# Patient Record
Sex: MALE | Race: OTHER | Employment: UNEMPLOYED | URBAN - METROPOLITAN AREA
[De-identification: names, ages, dates, MRNs, and addresses within clinical notes are randomized per-mention and may not be internally consistent; named-entity substitution may affect disease eponyms.]

---

## 2019-03-14 ENCOUNTER — HOSPITAL ENCOUNTER (INPATIENT)
Age: 24
LOS: 5 days | Discharge: HOME | DRG: 750 | End: 2019-03-19
Attending: EMERGENCY MEDICINE | Admitting: PSYCHIATRY & NEUROLOGY
Payer: MEDICAID

## 2019-03-14 DIAGNOSIS — Z00.00 GENERAL MEDICAL EXAM: ICD-10-CM

## 2019-03-14 DIAGNOSIS — R45.851 SUICIDAL IDEATION: ICD-10-CM

## 2019-03-14 DIAGNOSIS — F32.2 CURRENT SEVERE EPISODE OF MAJOR DEPRESSIVE DISORDER WITHOUT PSYCHOTIC FEATURES, UNSPECIFIED WHETHER RECURRENT (HCC): Primary | ICD-10-CM

## 2019-03-14 PROBLEM — F20.9 SCHIZOPHRENIA (HCC): Status: ACTIVE | Noted: 2019-03-14

## 2019-03-14 LAB
ALBUMIN SERPL-MCNC: 4.2 G/DL (ref 3.5–5)
ALBUMIN/GLOB SERPL: 1.2 {RATIO} (ref 1.1–2.2)
ALP SERPL-CCNC: 130 U/L (ref 45–117)
ALT SERPL-CCNC: 27 U/L (ref 12–78)
AMPHET UR QL SCN: NEGATIVE
ANION GAP SERPL CALC-SCNC: 3 MMOL/L (ref 5–15)
APAP SERPL-MCNC: <2 UG/ML (ref 10–30)
APPEARANCE UR: CLEAR
AST SERPL-CCNC: 15 U/L (ref 15–37)
BACTERIA URNS QL MICRO: NEGATIVE /HPF
BARBITURATES UR QL SCN: NEGATIVE
BASOPHILS # BLD: 0 K/UL (ref 0–0.1)
BASOPHILS NFR BLD: 0 % (ref 0–1)
BENZODIAZ UR QL: NEGATIVE
BILIRUB SERPL-MCNC: 0.5 MG/DL (ref 0.2–1)
BILIRUB UR QL: NEGATIVE
BUN SERPL-MCNC: 10 MG/DL (ref 6–20)
BUN/CREAT SERPL: 9 (ref 12–20)
CALCIUM SERPL-MCNC: 9.3 MG/DL (ref 8.5–10.1)
CANNABINOIDS UR QL SCN: NEGATIVE
CHLORIDE SERPL-SCNC: 105 MMOL/L (ref 97–108)
CO2 SERPL-SCNC: 31 MMOL/L (ref 21–32)
COCAINE UR QL SCN: NEGATIVE
COLOR UR: ABNORMAL
CREAT SERPL-MCNC: 1.07 MG/DL (ref 0.7–1.3)
DIFFERENTIAL METHOD BLD: ABNORMAL
DRUG SCRN COMMENT,DRGCM: NORMAL
EOSINOPHIL # BLD: 0 K/UL (ref 0–0.4)
EOSINOPHIL NFR BLD: 0 % (ref 0–7)
EPITH CASTS URNS QL MICRO: ABNORMAL /LPF
ERYTHROCYTE [DISTWIDTH] IN BLOOD BY AUTOMATED COUNT: 12.2 % (ref 11.5–14.5)
ETHANOL SERPL-MCNC: <10 MG/DL
GLOBULIN SER CALC-MCNC: 3.5 G/DL (ref 2–4)
GLUCOSE SERPL-MCNC: 88 MG/DL (ref 65–100)
GLUCOSE UR STRIP.AUTO-MCNC: NEGATIVE MG/DL
HCT VFR BLD AUTO: 44.7 % (ref 36.6–50.3)
HGB BLD-MCNC: 14.5 G/DL (ref 12.1–17)
HGB UR QL STRIP: ABNORMAL
IMM GRANULOCYTES # BLD AUTO: 0 K/UL (ref 0–0.04)
IMM GRANULOCYTES NFR BLD AUTO: 0 % (ref 0–0.5)
KETONES UR QL STRIP.AUTO: NEGATIVE MG/DL
LEUKOCYTE ESTERASE UR QL STRIP.AUTO: NEGATIVE
LYMPHOCYTES # BLD: 1.2 K/UL (ref 0.8–3.5)
LYMPHOCYTES NFR BLD: 17 % (ref 12–49)
MCH RBC QN AUTO: 29.6 PG (ref 26–34)
MCHC RBC AUTO-ENTMCNC: 32.4 G/DL (ref 30–36.5)
MCV RBC AUTO: 91.2 FL (ref 80–99)
METHADONE UR QL: NEGATIVE
MONOCYTES # BLD: 0.4 K/UL (ref 0–1)
MONOCYTES NFR BLD: 6 % (ref 5–13)
NEUTS SEG # BLD: 5.3 K/UL (ref 1.8–8)
NEUTS SEG NFR BLD: 77 % (ref 32–75)
NITRITE UR QL STRIP.AUTO: NEGATIVE
NRBC # BLD: 0 K/UL (ref 0–0.01)
NRBC BLD-RTO: 0 PER 100 WBC
OPIATES UR QL: NEGATIVE
PCP UR QL: NEGATIVE
PH UR STRIP: 7 [PH] (ref 5–8)
PLATELET # BLD AUTO: 221 K/UL (ref 150–400)
PMV BLD AUTO: 9.8 FL (ref 8.9–12.9)
POTASSIUM SERPL-SCNC: 3.9 MMOL/L (ref 3.5–5.1)
PROT SERPL-MCNC: 7.7 G/DL (ref 6.4–8.2)
PROT UR STRIP-MCNC: NEGATIVE MG/DL
RBC # BLD AUTO: 4.9 M/UL (ref 4.1–5.7)
RBC #/AREA URNS HPF: ABNORMAL /HPF (ref 0–5)
SALICYLATES SERPL-MCNC: <1.7 MG/DL (ref 2.8–20)
SODIUM SERPL-SCNC: 139 MMOL/L (ref 136–145)
SP GR UR REFRACTOMETRY: 1.01 (ref 1–1.03)
UR CULT HOLD, URHOLD: NORMAL
UROBILINOGEN UR QL STRIP.AUTO: 0.2 EU/DL (ref 0.2–1)
WBC # BLD AUTO: 7 K/UL (ref 4.1–11.1)
WBC URNS QL MICRO: ABNORMAL /HPF (ref 0–4)

## 2019-03-14 PROCEDURE — 80053 COMPREHEN METABOLIC PANEL: CPT

## 2019-03-14 PROCEDURE — 80307 DRUG TEST PRSMV CHEM ANLYZR: CPT

## 2019-03-14 PROCEDURE — 99285 EMERGENCY DEPT VISIT HI MDM: CPT

## 2019-03-14 PROCEDURE — 85025 COMPLETE CBC W/AUTO DIFF WBC: CPT

## 2019-03-14 PROCEDURE — 74011250637 HC RX REV CODE- 250/637: Performed by: PSYCHIATRY & NEUROLOGY

## 2019-03-14 PROCEDURE — 81001 URINALYSIS AUTO W/SCOPE: CPT

## 2019-03-14 PROCEDURE — 90791 PSYCH DIAGNOSTIC EVALUATION: CPT

## 2019-03-14 PROCEDURE — 36415 COLL VENOUS BLD VENIPUNCTURE: CPT

## 2019-03-14 PROCEDURE — 65220000003 HC RM SEMIPRIVATE PSYCH

## 2019-03-14 RX ORDER — ADHESIVE BANDAGE
30 BANDAGE TOPICAL DAILY PRN
Status: DISCONTINUED | OUTPATIENT
Start: 2019-03-14 | End: 2019-03-19 | Stop reason: HOSPADM

## 2019-03-14 RX ORDER — ACETAMINOPHEN 325 MG/1
650 TABLET ORAL
Status: DISCONTINUED | OUTPATIENT
Start: 2019-03-14 | End: 2019-03-19 | Stop reason: HOSPADM

## 2019-03-14 RX ORDER — BENZTROPINE MESYLATE 2 MG/1
2 TABLET ORAL
Status: DISCONTINUED | OUTPATIENT
Start: 2019-03-14 | End: 2019-03-19 | Stop reason: HOSPADM

## 2019-03-14 RX ORDER — LORAZEPAM 2 MG/ML
2 INJECTION INTRAMUSCULAR
Status: DISCONTINUED | OUTPATIENT
Start: 2019-03-14 | End: 2019-03-18

## 2019-03-14 RX ORDER — IBUPROFEN 200 MG
1 TABLET ORAL
Status: DISCONTINUED | OUTPATIENT
Start: 2019-03-14 | End: 2019-03-19 | Stop reason: HOSPADM

## 2019-03-14 RX ORDER — BENZTROPINE MESYLATE 1 MG/ML
2 INJECTION INTRAMUSCULAR; INTRAVENOUS
Status: DISCONTINUED | OUTPATIENT
Start: 2019-03-14 | End: 2019-03-19 | Stop reason: HOSPADM

## 2019-03-14 RX ORDER — TRAZODONE HYDROCHLORIDE 50 MG/1
50 TABLET ORAL
Status: DISCONTINUED | OUTPATIENT
Start: 2019-03-14 | End: 2019-03-15

## 2019-03-14 RX ORDER — OLANZAPINE 5 MG/1
5 TABLET ORAL
Status: DISCONTINUED | OUTPATIENT
Start: 2019-03-14 | End: 2019-03-19 | Stop reason: HOSPADM

## 2019-03-14 RX ORDER — LORAZEPAM 1 MG/1
1 TABLET ORAL
Status: DISCONTINUED | OUTPATIENT
Start: 2019-03-14 | End: 2019-03-18

## 2019-03-14 RX ORDER — ZOLPIDEM TARTRATE 10 MG/1
10 TABLET ORAL
Status: DISCONTINUED | OUTPATIENT
Start: 2019-03-14 | End: 2019-03-14

## 2019-03-14 RX ADMIN — ZOLPIDEM TARTRATE 10 MG: 10 TABLET ORAL at 21:36

## 2019-03-14 RX ADMIN — OLANZAPINE 5 MG: 5 TABLET, FILM COATED ORAL at 18:45

## 2019-03-14 NOTE — ED TRIAGE NOTES
Patient arrives for SI that has started a couple of months ago but yesterday became increasingly worse. No plan. Patient states that he has never carried through with an actual plan, just thoughts. Patient was on meds, but stopped them when they weren't approve by insurance. Mother arrives in triage with patient, tearmilvia eyed, states patient \"leaves home and he's disabled - autistic, psychosis. He's very combative these days. \"     + Auditory, visual hallucinations \"it feels like ten thousand people in the room, its loud. I cant turn it off. Denies HI    Contracts for safety. Patient also complains of right sided groin pain upon urination and bowel movement.

## 2019-03-14 NOTE — BH NOTES
PRN Medication Documentation    Specific patient behavior that led to need for PRN medication: pt c/o hearing voices Staff interventions attempted prior to PRN being given:redirection  PRN medication given: zyprexa Patient response/effectiveness of PRN medication: tl aware

## 2019-03-14 NOTE — BH NOTES
Primary Nurse Lu Whitlock RN and River Salmon RN performed a dual skin assessment on this patient. No impairment noted  Garry score is 23.

## 2019-03-14 NOTE — ROUTINE PROCESS
TRANSFER - OUT REPORT:    Verbal report given to Henry Jackson RN(name) on Vladimir Blood  being transferred to [facility](unit) for routine progression of care       Report consisted of patients Situation, Background, Assessment and   Recommendations(SBAR). Information from the following report(s) SBAR, Kardex, Intake/Output, MAR and Recent Results was reviewed with the receiving nurse. Lines:       Opportunity for questions and clarification was provided.

## 2019-03-14 NOTE — BSMART NOTE
Comprehensive Assessment Form Part 1      Section I - Disposition    Axis I - Schizophrenia  Axis II - Asperger Syndrome  Axis III -   Past Medical History:   Diagnosis Date    Asperger syndrome     Schizophrenia ([address]     Scoliosis      Axis IV - social isolation, absent father, recent relocation  Axis V - 30-35      The Medical Doctor to Psychiatrist conference was not completed. The Medical Doctor is in agreement with Psychiatrist disposition because of (reason) they agree with disposition to admit. The plan is admission to behavioral health unit. The on-call Psychiatrist consulted was NP Epstien. The admitting Psychiatrist will be to be determined. The admitting Diagnosis is Schizophrenia. The Payor source is [facility] [address] Medicaid. Section II - Integrated Summary  Summary: Writer met with this patient face to face at [facility] Emergency Department. He stated that he is here today due to thoughts of suicide for \"months\". He denied having a specific plan, however stated \"I will do anything at this point\". Willis [place] stated that he has been hearing voices that have gotten very loud. He says that they bully him and tell him that they are going to kill him. He says this has being causing \"episodes\" where he will fight the voices. He denied homicidal ideation and visual hallucinations. The patient's mother states that she does not believe that she can manage him at the moment and she is very scared for his safety. Writer contacted on call Nurse Practitioner Woodson Leyden who instructed writer to admit this patient to the behavioral health unit. Writer discussed this with the patient and his mother and he is voluntary for admission. The patient has demonstrated mental capacity to provide informed consent. The information is given by the patient and parent. The Chief Complaint is \"I'm having suicidal thoughts\". The Precipitant Factors are see axis IV.   Previous Hospitalizations: none  The patient has not previously been in restraints. Current Psychiatrist and/or  is none. Lethality Assessment:    The potential for suicide noted by the following: active psychosis, ideation and means . The potential for homicide is not noted. The patient has not been a perpetrator of sexual or physical abuse. There are not pending charges. The patient is felt to be at risk for self harm or harm to others. The attending nurse was advised the patient needs supervision. Section III - Psychosocial  The patient's overall mood and attitude is irritable. Feelings of helplessness and hopelessness are observed by self report of \"It's not going to get better. This is for life\". Generalized anxiety is not observed. Panic is not observed. Phobias are not observed. Obsessive compulsive tendencies are not observed. Section IV - Mental Status Exam  The patient's appearance shows no evidence of impairment. The patient's behavior shows poor eye contact. The patient is oriented to time, place, person and situation. The patient's speech is pressured. The patient's mood is irritable. The range of affect is labile. The patient's thought content demonstrates no evidence of impairment. The thought process is tangential.  The patient's perception demonstrated changes in the following:  auditory  visual hallucinations. The patient's memory shows no evidence of impairment. The patient's appetite shows no evidence of impairment. The patient's sleep has evidence of insomnia. The patient's insight is blaming. The patient's judgement is psychologically impaired. Section V - Substance Abuse  The patient is not using substances. Section VI - Living Arrangements  The patient is single. The patient lives with a parent. The patient has no children. The patient does plan to return home upon discharge. The patient does not have legal issues pending.  The patient's source of income comes from disability and family. Yazidi and cultural practices have been noted and include: a strong belief in Sabianism which he cites as a protective factor. The patient's greatest support comes from \"my mom\" and this person will be involved with the treatment. The patient has not been in an event described as horrible or outside the realm of ordinary life experience either currently or in the past.  The patient has not been a victim of sexual/physical abuse. Section VII - Other Areas of Clinical Concern  The highest grade achieved is 12th grade with the overall quality of school experience being described as poor. The patient is currently disabled and speaks [place] as a primary language. The patient has no communication impairments affecting communication. The patient's preference for learning can be described as: unknown given autism spectrum. The patient's hearing is normal.  The patient's vision is impaired and  wears glasses or contacts.       ANA Rodríguez, Supervisee in Social Work

## 2019-03-14 NOTE — ED PROVIDER NOTES
21 y.o. male with past medical history significant for Asperger syndrome, schizophrenia, and scoliosis who presents from home with chief complaint of SI. Pt has been experiencing worsening SI over the past couple of months with no plan. He also c/o auditory and visual hallucinations. Pt has a h/o autism but no other medical hx. Pt denies HI. There are no other acute medical concerns at this time. Social hx: no tobacco use; no EtOH use    Note written by Haritha Rosenberg, as dictated by Rhianna Tejada MD 3:51 PM      The history is provided by the patient. No  was used. Past Medical History:   Diagnosis Date    Asperger syndrome     [address] ([facility] [address]     Scoliosis        Past Surgical History:   Procedure Laterality Date    HX HERNIA REPAIR      as an infant          History reviewed. No pertinent family history. Social History     Socioeconomic History    Marital status: SINGLE     Spouse name: Not on file    Number of children: Not on file    Years of education: Not on file    Highest education level: Not on file   Social Needs    Financial resource strain: Not on file    Food insecurity - worry: Not on file    Food insecurity - inability: Not on file    Transportation needs - medical: Not on file   Genlot needs - non-medical: Not on file   Occupational History    Not on file   Tobacco Use    Smoking status: Never Smoker    Smokeless tobacco: Never Used   Substance and Sexual Activity    Alcohol use: No    Drug use: No    Sexual activity: Not on file   Other Topics Concern    Not on file   Social History Narrative    Not on file         ALLERGIES: Aspirin    Review of Systems   Constitutional: Negative for activity change, appetite change and fatigue. HENT: Negative for ear pain, facial swelling, sore throat and trouble swallowing. Eyes: Negative for pain, discharge and visual disturbance.    Respiratory: Negative for chest tightness, shortness of breath and wheezing. Cardiovascular: Negative for chest pain and palpitations. Gastrointestinal: Negative for abdominal pain, blood in stool, nausea and vomiting. Genitourinary: Negative for difficulty urinating, flank pain and hematuria. Musculoskeletal: Negative for arthralgias, joint swelling, myalgias and neck pain. Skin: Negative for color change and rash. Neurological: Negative for dizziness, weakness, numbness and headaches. Hematological: Negative for adenopathy. Does not bruise/bleed easily. Psychiatric/Behavioral: Positive for suicidal ideas. Negative for behavioral problems, confusion and sleep disturbance. All other systems reviewed and are negative. Vitals:    03/14/19 1323 03/14/19 1341   BP:  115/63   Pulse: 90 85   Resp:  15   Temp:  97.9 °F (36.6 °C)   SpO2: 100% 99%            Physical Exam   Constitutional: He is oriented to person, place, and time. He appears well-developed and well-nourished. No distress. HENT:   Head: Normocephalic and atraumatic. Nose: Nose normal.   Mouth/Throat: Oropharynx is clear and moist.   Eyes: Conjunctivae and EOM are normal. Pupils are equal, round, and reactive to light. No scleral icterus. Neck: Normal range of motion. Neck supple. No JVD present. No tracheal deviation present. No thyromegaly present. No carotid bruits noted. Cardiovascular: Normal rate, regular rhythm, normal heart sounds and intact distal pulses. Exam reveals no gallop and no friction rub. No murmur heard. Pulmonary/Chest: Effort normal and breath sounds normal. No respiratory distress. He has no wheezes. He has no rales. He exhibits no tenderness. Abdominal: Soft. Bowel sounds are normal. He exhibits no distension and no mass. There is no tenderness. There is no rebound and no guarding. Musculoskeletal: Normal range of motion. He exhibits no edema or tenderness. Lymphadenopathy:     He has no cervical adenopathy.    Neurological: He is alert and oriented to person, place, and time. He has normal reflexes. No cranial nerve deficit. Coordination normal.   Skin: Skin is warm and dry. No rash noted. No erythema. Psychiatric: He has a normal mood and affect. His behavior is normal. Judgment and thought content normal.   Nursing note and vitals reviewed. Note written by Haritha Zimmerman, as dictated by Shayan Irvin MD 3:53 PM     MDM  Number of Diagnoses or Management Options     Amount and/or Complexity of Data Reviewed  Clinical lab tests: ordered and reviewed  Decide to obtain previous medical records or to obtain history from someone other than the patient: yes  Review and summarize past medical records: yes  Discuss the patient with other providers: yes    Risk of Complications, Morbidity, and/or Mortality  Presenting problems: moderate  Diagnostic procedures: moderate  Management options: moderate    Patient Progress  Patient progress: stable         Procedures    CONSULT NOTE:  3:37 PM Shayan Irvin MD spoke with [facility]. Discussed available diagnostic tests and clinical findings. Mila Saleh has evaluated the pt and plans to admit to Psychiatry pending medical clearance. PROGRESS NOTE:  5:22 PM  Pt is medically cleared. Will admit to Psychiatry, MARISA Frank.    5:22 PM  Patient is being admitted to the hospital.  The results of their tests and reasons for their admission have been discussed with them and/or available family. They convey agreement and understanding for the need to be admitted and for their admission diagnosis.

## 2019-03-14 NOTE — ED NOTES
Patient wearing paper scrubs, clothing and belongings secured at nurses station.      Patient contracts for safety

## 2019-03-14 NOTE — BH NOTES
TRANSFER - IN REPORT:    Verbal report received from [facility] SHANITA (RN) on Rollo Severe  being received from ED for routine progression of care      Report consisted of patients Situation, Background, Assessment and   Recommendations(SBAR). Information from the following report(s) Kardex was reviewed with the receiving nurse. Opportunity for questions and clarification was provided. Assessment completed upon patients arrival to unit and care assumed.

## 2019-03-15 PROCEDURE — 74011250637 HC RX REV CODE- 250/637: Performed by: NURSE PRACTITIONER

## 2019-03-15 PROCEDURE — 65220000003 HC RM SEMIPRIVATE PSYCH

## 2019-03-15 PROCEDURE — 74011250637 HC RX REV CODE- 250/637: Performed by: PSYCHIATRY & NEUROLOGY

## 2019-03-15 RX ORDER — BUSPIRONE HYDROCHLORIDE 5 MG/1
5 TABLET ORAL 3 TIMES DAILY
Status: DISCONTINUED | OUTPATIENT
Start: 2019-03-15 | End: 2019-03-19 | Stop reason: HOSPADM

## 2019-03-15 RX ORDER — QUETIAPINE FUMARATE 50 MG/1
50 TABLET, FILM COATED ORAL
Status: ON HOLD | COMMUNITY
End: 2019-03-15

## 2019-03-15 RX ORDER — ARIPIPRAZOLE 5 MG/1
5 TABLET ORAL 2 TIMES DAILY
Status: DISCONTINUED | OUTPATIENT
Start: 2019-03-15 | End: 2019-03-18

## 2019-03-15 RX ORDER — SERTRALINE HYDROCHLORIDE 50 MG/1
50 TABLET, FILM COATED ORAL DAILY
Status: DISCONTINUED | OUTPATIENT
Start: 2019-03-15 | End: 2019-03-19 | Stop reason: HOSPADM

## 2019-03-15 RX ORDER — BENZTROPINE MESYLATE 1 MG/1
1 TABLET ORAL 2 TIMES DAILY
Status: DISCONTINUED | OUTPATIENT
Start: 2019-03-15 | End: 2019-03-19 | Stop reason: HOSPADM

## 2019-03-15 RX ORDER — QUETIAPINE FUMARATE 25 MG/1
50 TABLET, FILM COATED ORAL
Status: DISCONTINUED | OUTPATIENT
Start: 2019-03-15 | End: 2019-03-19 | Stop reason: HOSPADM

## 2019-03-15 RX ORDER — TRAZODONE HYDROCHLORIDE 50 MG/1
50 TABLET ORAL
Status: DISCONTINUED | OUTPATIENT
Start: 2019-03-15 | End: 2019-03-19 | Stop reason: HOSPADM

## 2019-03-15 RX ADMIN — SERTRALINE HYDROCHLORIDE 50 MG: 50 TABLET ORAL at 17:05

## 2019-03-15 RX ADMIN — BENZTROPINE MESYLATE 1 MG: 1 TABLET ORAL at 17:05

## 2019-03-15 RX ADMIN — ARIPIPRAZOLE 5 MG: 5 TABLET ORAL at 17:05

## 2019-03-15 RX ADMIN — OLANZAPINE 5 MG: 5 TABLET, FILM COATED ORAL at 12:21

## 2019-03-15 RX ADMIN — BUSPIRONE HYDROCHLORIDE 5 MG: 5 TABLET ORAL at 17:05

## 2019-03-15 RX ADMIN — BUSPIRONE HYDROCHLORIDE 5 MG: 5 TABLET ORAL at 20:46

## 2019-03-15 RX ADMIN — TRAZODONE HYDROCHLORIDE 50 MG: 50 TABLET ORAL at 20:46

## 2019-03-15 NOTE — PROGRESS NOTES
Problem: Discharge Planning  Goal: *Discharge to safe environment  Outcome: Progressing Towards Goal  He will return home when ready for discharge   Supportive mother     Goal: *Knowledge of medication management  Outcome: Progressing Towards Goal  He is willing to take his medications   Goal: *Knowledge of discharge instructions  Outcome: Progressing Towards Goal  He is able to verbalize discharge instructions and he will need an out patient provider     Problem: Patient Education: Go to Patient Education Activity  Goal: Patient/Family Education  Outcome: Progressing Towards Goal  SW will educate patient and family regarding discharge instructions

## 2019-03-15 NOTE — BH NOTES
PSYCHOSOCIAL ASSESSMENT  :Patient identifying info:  Maribel Corona is a 21 y.o., male admitted 3/14/2019  2:54 PM     Presenting problem and precipitating factors: he came to the ER with suicidal thoughts for \" months\" . He has been hearing voices telling him to harm self. He denied homicidal ideations and has never acted on the suicidal ideations . He was hospitalized once at age 16 at [facility] for voices . Mental status assessment:alert , oriented x's 3 , speech was somewhat pressured , thoughts were tangential , his judgement is impaired     Collateral information: Mother - Ms. Horton Bamberger- will to mom , she is supportive, stated he had been off medications since June 2018  He had been refusing to restart medications and had been actin out at home. She was glad he agreed to restart medications and we discussed his outpatient care to start at [facility] in the [address]. Current psychiatric /substance abuse providers and contact info: no current provider     Previous psychiatric/substance abuse providers and response to treatment: he was hospitalized once at [facility] age 16     Family history of mental illness or substance abuse: unknown     Substance abuse history:  No history  Social History     Tobacco Use    Smoking status: Never Smoker    Smokeless tobacco: Never Used   Substance Use Topics    Alcohol use: No       History of biomedical complications associated with substance abuse :n/a     Patient's current acceptance of treatment or motivation for change:no    Family constellation: single, no children    Is significant other involved?        Describe support system:mother      Describe living arrangements and home environment:lives with his mom     Health issues:   Hospital Problems  Never Reviewed          Codes Class Noted POA    Schizophrenia [facility]) ICD-10-CM: F20.9  ICD-9-CM: 295.90  3/14/2019 Unknown              Trauma history: none noted     Legal issues: no    History of  service: no    Financial status:     Quaker/cultural factors: María    Education/work history: high school education     Have you been licensed as a health care professional (current or ): no    Leisure and recreation preferences:     Describe coping skills:ineffectual     Isaac Middleton  3/15/2019

## 2019-03-15 NOTE — PROGRESS NOTES
Admission Medication Reconciliation:    Information obtained from:  Patient interview (pharmacist not present, per social work/provider)    Comments/Recommendations: Updated PTA meds/reviewed patient's allergies. 1)  Patient told RN, , and provider that he had previously been on:   - aripiprazole 10mg BID  - sertraline 100mg daily  - buspirone 15mg TID  - benztropine 1mg BID  - quetiapine 50mg QHS PRN sleep     Patient has not had medications in a few months as he recently moved from [facility] and has not yet set up care in [place].     2)  Medication changes (since last review): none     Allergies:  Aspirin    Significant PMH/Disease States:   Past Medical History:   Diagnosis Date    Asperger syndrome     [address]ia ([facility] [address]     Scoliosis      Chief Complaint for this Admission:    Chief Complaint   Patient presents with    Suicidal     Prior to Admission Medications:   None     Jason Pedersen, PharmD, BCPP, BCPS  Clinical Pharmacy Specialist, [facility]

## 2019-03-15 NOTE — PROGRESS NOTES
Problem: Altered Thought Process (Adult/Pediatric)  Goal: *STG: Complies with medication therapy  Outcome: Progressing Towards Goal  Patient is visible in the milieu. Alert, calm and cooperative. No distress noted.

## 2019-03-15 NOTE — PROGRESS NOTES
Problem: Falls - Risk of  Goal: *Absence of Falls  Document Stu Fall Risk and appropriate interventions in the flowsheet. Outcome: Progressing Towards Goal  Fall Risk Interventions:     Resting in bed with eyes closed, no complaints, no distress noted. Safety measures in place, will continue to monitor.                  History of Falls Interventions: (Patient verbalized hx of falling asleep when on the toilet  )

## 2019-03-15 NOTE — BH NOTES
Received pt on phone. NAD. Pt writing in note book. Calm. Problem: Altered Thought Process (Adult/Pediatric)    Meet by 3/27/19  Goal: *STG: Seeks staff when feelings of anxiety and fear arise  Outcome: Progressing Towards Goal  Pt verbalizes feelings, concerns, needs to staff. Goal: *STG: Complies with medication therapy  Outcome: Progressing Towards Goal  Medication and meal compliant. Education provided. Pt denies side effects to medications. Goal: *STG: Attends activities and groups  Outcome: Progressing Towards Goal  Pt encouraged to attend groups and activities. Goal: *STG: Decreased hallucinations  Outcome: Progressing Towards Goal  Pt reports AH. Increased. Impulsive. Anxious. Requires space to de escalate during increased anxiety and AH.      [address] [facility]  Master Treatment Plan for Nhung Armas    Date Treatment Plan Initiated: 3/15/19    Treatment Plan Modalities:  Type of Modality Amount  (x minutes) Frequency (x/week) Duration (x days) Name of Responsible Staff   [address] [facility] meetings to encourage peer interactions Jacob CHAUDHARI 16.   Group psychotherapy to assist in building coping skills and internal controls 60 7 1 Terrell Davenport   Therapeutic activity groups to build coping skills 60 7 1 Terrell Davenport   Psychoeducation in group setting to address:   Medication education   15 7 1 Brandon HAZEL   Coping skills         Relaxation techniques         Symptom management         Discharge planning   61 2 255 [facility]    60 2 1 150 [facility]   61 1 1 volunteer   Recovery/AA/NA      volunteer   Physician medication management   13 7 906 [facility]    Family meeting/discharge planning   15 2 1400 [facility] and Denise Nettle                                      Blocked Bed Documentation:    Room number: 726  Type: Behavior  Rationale: Impulsive  Anticipated duration: re evaluate in 24 hrs  Additional comments: ID

## 2019-03-15 NOTE — INTERDISCIPLINARY ROUNDS
Behavioral Health Interdisciplinary Rounds     Patient Name: Sulema Mercado  Age: 21 y.o. Room/Bed:  726/  Primary Diagnosis: <principal problem not specified>   Admission Status: Voluntary     Readmission within 30 days: no  Power of  in place: no  Patient requires a blocked bed: no          Reason for blocked bed:     VTE Prophylaxis: No    Mobility needs/Fall risk: no  Flu Vaccine : yes, PTA  Nutritional Plan: no  Consults:          Labs/Testing due today?: no    Sleep hours:  5      Participation in Care/Groups:  no  Medication Compliant? No scheduled meds  PRNS (last 24 hours): Sleep Aid    Restraints (last 24 hours):  no     CIWA (range last 24 hours):     COWS (range last 24 hours):      Alcohol screening (AUDIT) completed -   AUDIT Score: 0     If applicable, date SBIRT discussed in treatment team AND documented:   AUDIT Screen Score: AUDIT Score: 0    Tobacco - patient is a smoker: Have You Used Tobacco in the Past 30 Days: No  Illegal Drugs use: Have You Used Any Illegal Substances Over the Past 12 Months: No    24 hour chart check complete: yes     Patient goal(s) for today:   Treatment team focus/goals: Plan to assess for medications and discharge needs. Progress note : He was pleasant and complaint with his treatment. Off medications for several months     LOS:  1  Expected LOS: TBD    Financial concerns/prescription coverage:  Medicaid   Date of last family contact:  SW spoke to his mom. [phone]     Family requesting physician contact today:    Discharge plan: he will return home with his mom. Guns in the home:  no     Outpatient provider(s): TBD     Participating treatment team members: Sulema Rogelio,  Yaron Hudson, NP - [address] [facility] Greg[facility]armD.

## 2019-03-15 NOTE — BH NOTES
GROUP THERAPY PROGRESS NOTE    Indra Pascual participated in the Acute Unit's afternoon Process Group with a focus   on identifying feelings, planning for the rest of the day, and preparing for discharge. Group time: 45 minutes. Personal goal for participation: To increase the capacity to improve ones mood and structure. Goal orientation: The patient will be able to identify their feelings, develop a plan for structuring their day, and discharge planning. Group therapy participation: With prompting, this patient actively participated in the group. Therapeutic interventions reviewed and discussed: The group members were asked to introduce   themselves to each other and to see if they could identify an emotion they are having and/or let the group know what they want to focus on for the rest of the day as they continue to make discharge plans. Impression of participation: The patient said he was \"okay\" and that he was \"working on being positive. \" He was not very clear about what brought him to the hospital but he admitted to [facility] some issues. Annamary Ripa Annamary Ripa I think I needed a break. \" He was redirected to see if he could use more \"I\" statements, rather than \"you\" statements because some of his comments were very general and as if he wanted to share his knowledge about self-discovery with his peers by giving a mini-lecture. With \"I\" statements, it was suggested that he might be able to deepen the group's conversation and give others an opportunity to connect better with him. The patient was alert and generally oriented. He expressed no current SI/HI and displayed no overt psychotic symptoms in this group, although this latter concern was not fully explored in this group. His affect was mostly anxious and his mood was distancing due to his intellectualization and rationalization. He may need more education regarding the function of group therapy in future groups.  This was the patient's first process group with the undersigned.

## 2019-03-16 PROCEDURE — 74011250637 HC RX REV CODE- 250/637: Performed by: NURSE PRACTITIONER

## 2019-03-16 PROCEDURE — 65220000003 HC RM SEMIPRIVATE PSYCH

## 2019-03-16 RX ADMIN — BUSPIRONE HYDROCHLORIDE 5 MG: 5 TABLET ORAL at 22:03

## 2019-03-16 RX ADMIN — BUSPIRONE HYDROCHLORIDE 5 MG: 5 TABLET ORAL at 16:59

## 2019-03-16 RX ADMIN — ARIPIPRAZOLE 5 MG: 5 TABLET ORAL at 17:00

## 2019-03-16 RX ADMIN — BUSPIRONE HYDROCHLORIDE 5 MG: 5 TABLET ORAL at 09:33

## 2019-03-16 RX ADMIN — SERTRALINE HYDROCHLORIDE 50 MG: 50 TABLET ORAL at 09:33

## 2019-03-16 RX ADMIN — BENZTROPINE MESYLATE 1 MG: 1 TABLET ORAL at 09:33

## 2019-03-16 RX ADMIN — ARIPIPRAZOLE 5 MG: 5 TABLET ORAL at 09:33

## 2019-03-16 RX ADMIN — BENZTROPINE MESYLATE 1 MG: 1 TABLET ORAL at 17:00

## 2019-03-16 NOTE — BH NOTES
GROUP THERAPY PROGRESS NOTE    Gelnroy Calvert is participating in [place]. Group time: 15 minutes    Personal goal for participation: to focus on the present. Goal orientation: personal    Group therapy participation: active    Therapeutic interventions reviewed and discussed: Writer listened attentively and explained the unit routine. Impression of participation: Patient participated actively.

## 2019-03-16 NOTE — PROGRESS NOTES
Problem: Altered Thought Process (Adult/Pediatric)  Goal: *STG: Seeks staff when feelings of anxiety and fear arise  Outcome: Progressing Towards Goal  Patient is received up in his room pacing. Distracted. Preoccupied. Staff will continue q15 checks and encourage pt to share feelings.     Blocked Bed Documentation:     Room number: 726  Type: Behavior  Rationale: Poor Self-Control  Anticipated duration: to be assessed daily at treatment team

## 2019-03-16 NOTE — H&P
[address]  HISTORY AND PHYSICAL    Name:  Yesenia Syed  MR#:  [id number]  :  1995  ACCOUNT #:  [de-identified]  ADMIT DATE:  2019    CHIEF COMPLAINT:  \"I have thoughts of wanting to die. \"    HISTORY OF PRESENTING ILLNESS:  The patient is a 26-year-old male who is admitted to [address] [place] [address] on a voluntary basis. He states that he was diagnosed with Asperger's at age 8 and subsequently was diagnosed with schizophrenia. He states that he started battling depression when he was 6years old and it kept worsening. He had thoughts of suicidal ideation, but he never acted on it. He is endorsing auditory hallucinations, voices wanting to kill him; visual hallucinations, seeing regular people that are not in this world that  he can only see. He states that his mood can get unpredictable, can get acutely psychotic, can get angry beating and cursing. He states that he responds based on what the voices tell him and his life is in jeopardy because of these voices. According to the admission note, he has been having thoughts of suicidal ideation for \"months. \"  He denied any plan. He states that the voices have been increasingly worsening, and the voice bullies him. He has been having these episodes where he will try to fight the voices. He currently denies suicidal ideation or homicidal ideation, but is endorsing auditory and visual hallucinations. He has been off his medication for the past 2 months. PAST MEDICAL HISTORY:  See H and P. PAST PSYCHIATRIC HOSPITALIZATION:  He states that he was admitted at [facility] when he was 16years old. He states that he has been diagnosed with schizophrenia and Asperger's. He is currently not seeing a psychiatrist since he recently moved from [place] two months ago. PSYCHOSOCIAL HISTORY:  He is single. He has no children. He finished high school. He is currently unemployed.   He is currently receiving social security disability checks. MENTAL STATUS EXAMINATION:  He is alert and oriented in all spheres. He is dressed in street clothes. Mood is euthymic. Affect is full range. Speech is pressured. Thought process is somewhat disorganized. Memory seems intact. Intelligence is average. Insight is poor. Judgment is poor. DIAGNOSIS:  Schizoaffective disorder, depressive type. TREATMENT AND PLAN:  I will continue his inpatient stay. He will be provided with support and encourage to attend groups. His safety will be monitored. His medications will be modified and assessed. Case management will work on discharge planning. ASSETS AND STRENGTHS:  He is willing to take his medications. ESTIMATED LENGTH OF STAY:  Five to seven days.         JIM CAMPBELL NP      [id number]  D:  03/15/2019 22:04  T:  03/16/2019 6:52  JOB #:  [id number]

## 2019-03-16 NOTE — PROGRESS NOTES
Psychiatric Progress Note    Date: 3/16/2019  Account Number:  [de-identified]  Name: Kal Conrad    Cc: \"I feel better today. \"       Patient Active Problem List    Diagnosis Date Noted    Schizophrenia ([facility] [address] 03/14/2019     Past Surgical History:   Procedure Laterality Date    HX HERNIA REPAIR      as an infant       Allergies   Allergen Reactions    Aspirin Unknown (comments)      Social History     Tobacco Use    Smoking status: Never Smoker    Smokeless tobacco: Never Used   Substance Use Topics    Alcohol use: No      History reviewed. No pertinent family history. Review of Systems   Positive for hallucination; Appetite:good; Sleep good   Rest of psychiatric review of systems made and considered negative. Medical review of systems made and considered negative. Patient Vitals for the past 8 hrs:   BP Temp Pulse Resp SpO2   03/16/19 1538 131/73 97.6 °F (36.4 °C) 99 16    03/16/19 1200 129/81 97.6 °F (36.4 °C) 93 16 98 %       Mental Status exam: WNL except for    Sensorium  oriented to time, place and person   Relations cooperative   Appearance:  age appropriate   Speech:  normal pitch   Thought Process: within normal limits   Thought Content hallucinations   Suicidal ideations none   Homicidal ideations none   Mood:  euthymic   Affect:  full range   Memory recent  adequate   Memory remote:  adequate   Concentration:  adequate   Insight:  poor   Judgment:  poor       Assessment/Plan:     Diagnoses: Active Problems:    Schizophrenia [facility]) (3/14/2019)     3/1619:   Mr. Chatman states he is feeling better today. He slept over seven hours and has been active in the unit, social with his peers. He denies SI or HI. He had auditory hallucinations briefly but states it seems like it's decreasing. He denies VH. He refused labs this morning. Continue current meds as prescribed. We will closely monitor. Disposition planning. Psychotherapy provided in regards to pre-admission and current problems. Psychoeducation provided. Treatment plan reviewed with patient, including diagnoses and medications. Case discussed in full with treatment team.  Chart reviewed in full. Medications:  Current Facility-Administered Medications   Medication Dose Route Frequency    busPIRone (BUSPAR) tablet 5 mg  5 mg Oral TID    sertraline (ZOLOFT) tablet 50 mg  50 mg Oral DAILY    ARIPiprazole (ABILIFY) tablet 5 mg  5 mg Oral BID    benztropine (COGENTIN) tablet 1 mg  1 mg Oral BID    QUEtiapine (SEROquel) tablet 50 mg  50 mg Oral QHS PRN    traZODone (DESYREL) tablet 50 mg  50 mg Oral QHS PRN    ziprasidone (GEODON) 20 mg in sterile water (preservative free) 1 mL injection  20 mg IntraMUSCular BID PRN    OLANZapine (ZyPREXA) tablet 5 mg  5 mg Oral Q6H PRN    benztropine (COGENTIN) tablet 2 mg  2 mg Oral BID PRN    benztropine (COGENTIN) injection 2 mg  2 mg IntraMUSCular BID PRN    LORazepam (ATIVAN) injection 2 mg  2 mg IntraMUSCular Q4H PRN    LORazepam (ATIVAN) tablet 1 mg  1 mg Oral Q4H PRN    acetaminophen (TYLENOL) tablet 650 mg  650 mg Oral Q4H PRN    magnesium hydroxide (MILK OF MAGNESIA) 400 mg/5 mL oral suspension 30 mL  30 mL Oral DAILY PRN    nicotine (NICODERM CQ) 21 mg/24 hr patch 1 Patch  1 Patch TransDERmal DAILY PRN         [x]    Medication Changes---see above medication list for details  Will continue to titrate/adjust medications as tolerated and to response.       Side Effects:  none      Signed By: Trey Owen NP

## 2019-03-16 NOTE — PROGRESS NOTES
Problem: Falls - Risk of  Goal: *Absence of Falls  Document Stu Fall Risk and appropriate interventions in the flowsheet. Outcome: Progressing Towards Goal  Fall Risk Interventions:     Non slip socks  Bed in low position       Medication Interventions: Teach patient to arise slowly         History of Falls Interventions: Room close to nurse's station, Door open when patient unattended        Problem: Altered Thought Process (Adult/Pediatric)  Goal: *STG: Participates in treatment plan  Outcome: Progressing Towards Goal  Pt. Met in treatment team   Discussed his thoughts    Hearing voices   Cooperative on unit  Goal: *STG: Complies with medication therapy  Outcome: Progressing Towards Goal  Medications reviewed in treatment team    Goal: *STG: Decreased delusional thinking  Outcome: Progressing Towards Goal  Less delusional thinking  Goal: Interventions  Outcome: Progressing Towards Goal  Will continue to monitor  On 15 min. checks for safety   Assess thought process   Medication  Compliance  Effectiveness     Encourage groups    [facility]  Master Treatment Plan Soraida Peak        Date Treatment Plan Initiated: 3/16/2019      Treatment Plan Modalities:    Type of Modality Amount  (x minutes) Frequency (x/week) Duration (x days) Name of Responsible Staff   Community & wrap-up meetings to encourage peer interactions    15    7    1     DAPHNE Dinh   Group psychotherapy to assist in building coping skills and internal controls    60    7    1    Terrell Davenport LCSW   Therapeutic activity groups to build coping skills    60    7    1    Terrell Davenport LCSW   Psychoeducation in group setting to address:   Medication education    15    7    1    Angelica Sinha RN   Coping skills    20    7    1    Augusto Gonzalez RN   Relaxation techniques           Symptom management           Discharge planning    15    7    1    Bren Leyva    Spirituality     60    7    1    Chaplain Layne Sheehan PITA    60    7    1    Volunteer from [facility]/AA/NA    60    7    1    Volunteer from [address] [place] [address] medication management    15    7    1 Thuy Ireland meeting/discharge planning

## 2019-03-16 NOTE — BH NOTES
2046: PRN Medication Documentation    Specific patient behavior that led to need for PRN medication: Pt requested medication.   PRN medication given: trazodone

## 2019-03-16 NOTE — PROGRESS NOTES
Problem: Falls - Risk of  Goal: *Absence of Falls  Document Stu Fall Risk and appropriate interventions in the flowsheet. Outcome: Progressing Towards Goal  Fall Risk Interventions:            Medication Interventions: Teach patient to arise slowly         History of Falls Interventions: Room close to nurse's station, Door open when patient unattended    Resting in bed with eyes closed, no complaints, no distress noted. Safety measures in place, will continue to monitor.     Blocked Bed Documentation:     Room number: 726  Type: Behavior  Rationale: paraniod  Anticipated duration: 24 hours  Additional comments:paranoid    0603-refused am labs

## 2019-03-16 NOTE — INTERDISCIPLINARY ROUNDS
Behavioral Health Interdisciplinary Rounds     Patient Name: Antonette Avitia  Age: 21 y.o. Room/Bed:  726/  Primary Diagnosis: <principal problem not specified>   Admission Status: Voluntary     Readmission within 30 days: no  Power of  in place: no  Patient requires a blocked bed: Yes         Reason for blocked bed: behavior    VTE Prophylaxis: No    Mobility needs/Fall risk: no  Flu Vaccine : yes, PTA   Nutritional Plan: no  Consults:          Labs/Testing due today?: yes-refused    Sleep hours:  7.5      Participation in Care/Groups:  yes  Medication Compliant?: Yes  PRNS (last 24 hours):  Antipsychotic (PO)    Restraints (last 24 hours):  no     CIWA (range last 24 hours):     COWS (range last 24 hours):      Alcohol screening (AUDIT) completed -   AUDIT Score: 0     If applicable, date SBIRT discussed in treatment team AND documented:   AUDIT Screen Score: AUDIT Score: 0    Tobacco - patient is a smoker: Have You Used Tobacco in the Past 30 Days: No  Illegal Drugs use: Have You Used Any Illegal Substances Over the Past 12 Months: No    24 hour chart check complete: yes     Patient goal(s) for today: Engage in unit activities  Treatment team focus/goals: Continue medication regimen  Progress note: Pt denies SI/HI; endorses improved mood; states he experienced AH (voices bullying him) yesterday, but none today; endorses VH today    LOS:  2  Expected LOS: TBD    Financial concerns/prescription coverage: [employer]  Date of last family contact: 3/15 SW spoke to mom   Family requesting physician contact today: No  Discharge plan: Return to mother's home  Guns in the home: No      Outpatient provider(s): TBD    Participating treatment team members: Antonette Avitia, ANA Emerson; Brigid Watt NP; Deardara Puga RN

## 2019-03-16 NOTE — BH NOTES
GROUP THERAPY PROGRESS NOTE    Indra Pascual is participating in Reflections. Group time: 20 minutes    Personal goal for participation: unit orientation and daily progress    Goal orientation: personal    Group therapy participation: active    Therapeutic interventions reviewed and discussed: \"15 ways to be happy\"    Impression of participation: Seems in good spirits. No self-disclosures in group. Observed engaged in side conversation with peer and not totally attentive but did not require redirection.

## 2019-03-17 PROCEDURE — 74011250637 HC RX REV CODE- 250/637: Performed by: NURSE PRACTITIONER

## 2019-03-17 PROCEDURE — 65220000003 HC RM SEMIPRIVATE PSYCH

## 2019-03-17 RX ADMIN — BUSPIRONE HYDROCHLORIDE 5 MG: 5 TABLET ORAL at 09:23

## 2019-03-17 RX ADMIN — BENZTROPINE MESYLATE 1 MG: 1 TABLET ORAL at 17:28

## 2019-03-17 RX ADMIN — BUSPIRONE HYDROCHLORIDE 5 MG: 5 TABLET ORAL at 17:28

## 2019-03-17 RX ADMIN — ARIPIPRAZOLE 5 MG: 5 TABLET ORAL at 17:28

## 2019-03-17 RX ADMIN — BENZTROPINE MESYLATE 1 MG: 1 TABLET ORAL at 09:23

## 2019-03-17 RX ADMIN — SERTRALINE HYDROCHLORIDE 50 MG: 50 TABLET ORAL at 09:24

## 2019-03-17 RX ADMIN — ARIPIPRAZOLE 5 MG: 5 TABLET ORAL at 09:23

## 2019-03-17 RX ADMIN — BUSPIRONE HYDROCHLORIDE 5 MG: 5 TABLET ORAL at 21:30

## 2019-03-17 NOTE — PROGRESS NOTES
Problem: Falls - Risk of  Goal: *Absence of Falls  Document Stu Fall Risk and appropriate interventions in the flowsheet. Outcome: Progressing Towards Goal  Fall Risk Interventions:  Mobility Interventions: Assess mobility with egress test    Mentation Interventions: Adequate sleep, hydration, pain control    Medication Interventions: Teach patient to arise slowly    Elimination Interventions: Toilet paper/wipes in reach    History of Falls Interventions: Room close to nurse's station      Resting in bed with eyes closed, no complaints, no distress noted. Safety measures in place, will continue to monitor. Blocked Bed Documentation:     Room number: 726  Type: Behavior  Rationale: paraniod  Anticipated duration: 24 hours  Additional comments:paranoid    0500-Refused AM labs.

## 2019-03-17 NOTE — PROGRESS NOTES
Problem: Altered Thought Process (Adult/Pediatric)  Goal: *STG: Seeks staff when feelings of anxiety and fear arise  Outcome: Progressing Towards Goal  Pt verbalizes feelings, concerns, needs to staff. Goal: *STG: Complies with medication therapy  Outcome: Progressing Towards Goal  Medication and meal compliant. Education provided. Pt denies side effects to medications. Goal: *STG: Attends activities and groups  Outcome: Progressing Towards Goal  Pt encouraged to attend groups and activities. Goal: *STG: Decreased hallucinations  Outcome: Progressing Towards Goal  Pt  Denies current AH.      Blocked Bed Documentation:    Room number: 726  Type: Behavior  Rationale: Impulsive  Anticipated duration: re evaluate in 24 hrs  Additional comments:Autistism
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
## 2019-03-17 NOTE — BH NOTES
GROUP THERAPY PROGRESS NOTE    Lee Gary is participating in [place]. Group time: 15 minutes    Personal goal for participation: patient express he accomplished his goal from this morning and was feeling well.     Goal orientation: community    Group therapy participation: active    Therapeutic interventions reviewed and discussed: yes    Impression of participation: engaged

## 2019-03-17 NOTE — INTERDISCIPLINARY ROUNDS
Behavioral Health Interdisciplinary Rounds     Patient Name: Rollo Severe  Age: 21 y.o. Room/Bed:  726/  Primary Diagnosis: <principal problem not specified>   Admission Status: Voluntary     Readmission within 30 days: no  Power of  in place: no  Patient requires a blocked bed: yes          Reason for blocked bed: behavior        VTE Prophylaxis: No    Mobility needs/Fall risk: no  Flu Vaccine : yes, PTA  Nutritional Plan: no  Consults:          Labs/Testing due today?: yes    Sleep hours: 4       Participation in Care/Groups:  yes  Medication Compliant?: Yes  PRNS (last 24 hours): None    Restraints (last 24 hours):  no     CIWA (range last 24 hours):     COWS (range last 24 hours):      Alcohol screening (AUDIT) completed -   AUDIT Score: 0     If applicable, date SBIRT discussed in treatment team AND documented:   AUDIT Screen Score: AUDIT Score: 0      Document Brief Intervention (corresponds directly with the 5 A's, Ask, Advise, Assess, Assist, and Arrange): At- Risk Patients (Score 7-15 for women; 8-15 for men)  Discuss concern patient is drinking at unhealthy levels known to increase risk of alcohol-related health problems. Is Patient ready to commit to change? If No:   Encourage reflection   Discuss short term and long term health risks of consuming alcohol   Barriers to change   Reaffirm willingness to help / Educational materials provided  If Yes:   Set goal  Encarnate provided    Harmful use or Dependence (Score 16 or greater)   Discuss short term and long term health risks of consuming alcohol   Recommendations   Negotiate drinking goal   Recommend addiction specialist/center   Arrange follow-up appointments.     Tobacco - patient is a smoker: Have You Used Tobacco in the Past 30 Days: No  Illegal Drugs use: Have You Used Any Illegal Substances Over the Past 12 Months: No    24 hour chart check complete: yes     Patient goal(s) for today: Treatment team focus/goals:   Progress note     LOS:  3  Expected LOS:     Financial concerns/prescription coverage:    Date of last family contact:     Family requesting physician contact today:    Discharge plan:   Guns in the home:        Outpatient provider(s):     Participating treatment team members: Enedina Lopez, * (assigned SW),

## 2019-03-17 NOTE — PROGRESS NOTES
Problem: Altered Thought Process (Adult/Pediatric)  Goal: *STG: Seeks staff when feelings of anxiety and fear arise  Outcome: Progressing Towards Goal  Pt verbalized to staff about feeling upset that his grandmother is in rehab. Found out from his mother during visitation today  Goal: *STG: Complies with medication therapy  Outcome: Progressing Towards Goal  Med/meal compliant   Goal: *STG: Attends activities and groups  Outcome: Progressing Towards Goal  Attending groups on general unit  Goal: *STG: Decreased delusional thinking  Outcome: Progressing Towards Goal  Verbalizes no S/I or H/I at this time     NAD noted. Will continue to monitor.

## 2019-03-17 NOTE — BH NOTES
GROUP THERAPY PROGRESS NOTE    Antonette Avitia is participating in [place]. Group time: 15 minutes    Personal goal for participation: To be calm. Goal orientation: personal    Group therapy participation: active    Therapeutic interventions reviewed and discussed: Writer listened attentively and explained the unit routine. Impression of participation: Patient participated actively.

## 2019-03-17 NOTE — PROGRESS NOTES
Psychiatric Progress Note    Date: 3/17/2019  Account Number:  [de-identified]  Name: Dolly Han    Cc: \"I feel better today. \"       Patient Active Problem List    Diagnosis Date Noted    Schizophrenia ([address] 03/14/2019     Past Surgical History:   Procedure Laterality Date    HX HERNIA REPAIR      as an infant       Allergies   Allergen Reactions    Aspirin Unknown (comments)      Social History     Tobacco Use    Smoking status: Never Smoker    Smokeless tobacco: Never Used   Substance Use Topics    Alcohol use: No      History reviewed. No pertinent family history. Review of Systems   Positive for hallucination; Appetite:good; Sleep good   Rest of psychiatric review of systems made and considered negative. Medical review of systems made and considered negative. Patient Vitals for the past 8 hrs:   BP Temp Pulse Resp   03/17/19 1344 132/78 98.1 °F (36.7 °C) 82 16       Mental Status exam: WNL except for    Sensorium  oriented to time, place and person   Relations cooperative   Appearance:  age appropriate   Speech:  normal pitch   Thought Process: within normal limits   Thought Content hallucinations   Suicidal ideations none   Homicidal ideations none   Mood:  euthymic   Affect:  full range   Memory recent  adequate   Memory remote:  adequate   Concentration:  adequate   Insight:  poor   Judgment:  poor       Assessment/Plan:     Diagnoses: Active Problems:    Schizophrenia [facility]) (3/14/2019)     3/16/19:   Mr. Ivan Lucero states he is feeling better today. He slept over seven hours and has been active in the unit, social with his peers. He denies SI or HI. He had auditory hallucinations briefly but states it seems like it's decreasing. He denies VH. He refused labs this morning. Continue current meds as prescribed. We will closely monitor. Disposition planning. 3/17/19:   Mr. Ivna Lucero continues to feel better. He states that his anxiety and auditory hallucinations are improving.  He state he had a mild episode of \"anxiety attack\" earlier this morning because one of his peers was making me nervous. He is requesting to board at the general side. He refused labs this morning, he thought it was not necessary because labs were drawn two days ago. He slept for hours. He denies SI HI or VH. Tolerating his medications and denies side effects. Repeat labs in the morning, he is receptive to this. He can board on the general side. Continue current medications as prescribed. If stable in the next 24 hours, we will plan for discharge in a.m. Psychotherapy provided in regards to pre-admission and current problems. Psychoeducation provided. Treatment plan reviewed with patient, including diagnoses and medications. Case discussed in full with treatment team.  Chart reviewed in full.         Medications:  Current Facility-Administered Medications   Medication Dose Route Frequency    busPIRone (BUSPAR) tablet 5 mg  5 mg Oral TID    sertraline (ZOLOFT) tablet 50 mg  50 mg Oral DAILY    ARIPiprazole (ABILIFY) tablet 5 mg  5 mg Oral BID    benztropine (COGENTIN) tablet 1 mg  1 mg Oral BID    QUEtiapine (SEROquel) tablet 50 mg  50 mg Oral QHS PRN    traZODone (DESYREL) tablet 50 mg  50 mg Oral QHS PRN    ziprasidone (GEODON) 20 mg in sterile water (preservative free) 1 mL injection  20 mg IntraMUSCular BID PRN    OLANZapine (ZyPREXA) tablet 5 mg  5 mg Oral Q6H PRN    benztropine (COGENTIN) tablet 2 mg  2 mg Oral BID PRN    benztropine (COGENTIN) injection 2 mg  2 mg IntraMUSCular BID PRN    LORazepam (ATIVAN) injection 2 mg  2 mg IntraMUSCular Q4H PRN    LORazepam (ATIVAN) tablet 1 mg  1 mg Oral Q4H PRN    acetaminophen (TYLENOL) tablet 650 mg  650 mg Oral Q4H PRN    magnesium hydroxide (MILK OF MAGNESIA) 400 mg/5 mL oral suspension 30 mL  30 mL Oral DAILY PRN    nicotine (NICODERM CQ) 21 mg/24 hr patch 1 Patch  1 Patch TransDERmal DAILY PRN         [x]    Medication Changes---see above medication list for details  Will continue to titrate/adjust medications as tolerated and to response.       Side Effects:  none      Signed By: Dharmesh Bartlett NP

## 2019-03-17 NOTE — BH NOTES
Blocked Bed Documentation:    Room number: 726  Type: Behavior  Rationale: Poor Self-Control  Anticipated duration: to be assessed daily at treatment team

## 2019-03-18 PROCEDURE — 74011250637 HC RX REV CODE- 250/637: Performed by: NURSE PRACTITIONER

## 2019-03-18 PROCEDURE — 74011250637 HC RX REV CODE- 250/637: Performed by: PSYCHIATRY & NEUROLOGY

## 2019-03-18 PROCEDURE — 65220000003 HC RM SEMIPRIVATE PSYCH

## 2019-03-18 RX ORDER — HYDROXYZINE 50 MG/1
50 TABLET, FILM COATED ORAL
Status: DISCONTINUED | OUTPATIENT
Start: 2019-03-18 | End: 2019-03-19 | Stop reason: HOSPADM

## 2019-03-18 RX ORDER — ARIPIPRAZOLE 5 MG/1
15 TABLET ORAL DAILY
Status: DISCONTINUED | OUTPATIENT
Start: 2019-03-19 | End: 2019-03-19 | Stop reason: HOSPADM

## 2019-03-18 RX ORDER — ARIPIPRAZOLE 5 MG/1
10 TABLET ORAL
Status: COMPLETED | OUTPATIENT
Start: 2019-03-18 | End: 2019-03-18

## 2019-03-18 RX ADMIN — BENZTROPINE MESYLATE 1 MG: 1 TABLET ORAL at 08:52

## 2019-03-18 RX ADMIN — ARIPIPRAZOLE 10 MG: 5 TABLET ORAL at 11:09

## 2019-03-18 RX ADMIN — TRAZODONE HYDROCHLORIDE 50 MG: 50 TABLET ORAL at 21:00

## 2019-03-18 RX ADMIN — BUSPIRONE HYDROCHLORIDE 5 MG: 5 TABLET ORAL at 21:00

## 2019-03-18 RX ADMIN — SERTRALINE HYDROCHLORIDE 50 MG: 50 TABLET ORAL at 08:52

## 2019-03-18 RX ADMIN — ARIPIPRAZOLE 5 MG: 5 TABLET ORAL at 08:51

## 2019-03-18 RX ADMIN — BUSPIRONE HYDROCHLORIDE 5 MG: 5 TABLET ORAL at 08:51

## 2019-03-18 RX ADMIN — BENZTROPINE MESYLATE 1 MG: 1 TABLET ORAL at 17:04

## 2019-03-18 RX ADMIN — HYDROXYZINE HYDROCHLORIDE 50 MG: 50 TABLET, FILM COATED ORAL at 17:04

## 2019-03-18 RX ADMIN — BUSPIRONE HYDROCHLORIDE 5 MG: 5 TABLET ORAL at 17:04

## 2019-03-18 RX ADMIN — LORAZEPAM 1 MG: 1 TABLET ORAL at 02:37

## 2019-03-18 NOTE — BH NOTES
Patient was boarding on [address] [facility] when general nurse called with concerns about pt behavior. He was moving about quickly, running and spinning on unit. He returned to the acute unit. Calm/cooperative and started participating in acute unit evening group.

## 2019-03-18 NOTE — PROGRESS NOTES
GROUP THERAPY PROGRESS NOTE    Jovany Salmon is participating in [place] [employer]. Group time: 15 minutes    Personal goal for participation: Patient stated his goal was\" to get through the day without incident. \" Patient stated he achieved it by \"reading the bible. \"    Goal orientation: community    Group therapy participation: active    Therapeutic interventions reviewed and discussed: Reviewed and reflected on goals set this morning and participated in an activity. Impression of participation: Active in discussion and provided positive feedback.

## 2019-03-18 NOTE — DISCHARGE INSTRUCTIONS
DISCHARGE SUMMARY    Manolo Hamm  : 1995  MRN: [id number]    The patient Glenroy Calvert exhibits the ability to control behavior in a less restrictive environment. Patient's level of functioning is improving. No assaultive/destructive behavior has been observed for the past 24 hours. No suicidal/homicidal threat or behavior has been observed for the past 24 hours. There is no evidence of serious medication side effects. Patient has not been in physical or protective restraints for at least the past 24 hours. If weapons involved, how are they secured? No weapons involved     Is patient aware of and in agreement with discharge plan? Patient is aware of discharge and is in agreement     Arrangements for medication:  Prescriptions given to patient. Copy of discharge instructions to  provider?:  Yes, fax to [phone]     Arrangements for transportation home:  Mother to      Keep all follow up appointments as scheduled, continue to take prescribed medications per physician instructions. Mental health crisis number:  [phone] or your local mental health crisis line number at [address] from Nurse    PATIENT INSTRUCTIONS: prescriptions given to patient      What to do at Home:  Recommended activity: Activity as tolerated,         *  Please give a list of your current medications to your Primary Care Provider. *  Please update this list whenever your medications are discontinued, doses are      changed, or new medications (including over-the-counter products) are added. *  Please carry medication information at all times in case of emergency situations. These are general instructions for a healthy lifestyle:    No smoking/ No tobacco products/ Avoid exposure to second hand smoke  Surgeon General's Warning:  Quitting smoking now greatly reduces serious risk to your health.     Obesity, smoking, and sedentary lifestyle greatly increases your risk for illness    A healthy diet, regular physical exercise & weight monitoring are important for maintaining a healthy lifestyle    You may be retaining fluid if you have a history of heart failure or if you experience any of the following symptoms:  Weight gain of 3 pounds or more overnight or 5 pounds in a week, increased swelling in our hands or feet or shortness of breath while lying flat in bed. Please call your doctor as soon as you notice any of these symptoms; do not wait until your next office visit. Recognize signs and symptoms of STROKE:    F-face looks uneven    A-arms unable to move or move unevenly    S-speech slurred or non-existent    T-time-call 911 as soon as signs and symptoms begin-DO NOT go       Back to bed or wait to see if you get better-TIME IS BRAIN. Warning Signs of HEART ATTACK     Call 911 if you have these symptoms:   Chest discomfort. Most heart attacks involve discomfort in the center of the chest that lasts more than a few minutes, or that goes away and comes back. It can feel like uncomfortable pressure, squeezing, fullness, or pain.  Discomfort in other areas of the upper body. Symptoms can include pain or discomfort in one or both arms, the back, neck, jaw, or stomach.  Shortness of breath with or without chest discomfort.  Other signs may include breaking out in a cold sweat, nausea, or lightheadedness. Don't wait more than five minutes to call 911 - MINUTES MATTER! Fast action can save your life. Calling 911 is almost always the fastest way to get lifesaving treatment. Emergency Medical Services staff can begin treatment when they arrive -- up to an hour sooner than if someone gets to the hospital by car. The discharge information has been reviewed with the patient. The patient verbalized understanding. Discharge medications reviewed with the patient and appropriate educational materials and side effects teaching were provided. [employer] Activation    Thank you for requesting access to [employer]. Please follow the instructions below to securely access and download your online medical record. [employer] allows you to send messages to your doctor, view your test results, renew your prescriptions, schedule appointments, and more. How Do I Sign Up? 1. In your internet browser, go to [email or web address]  2. Click on the First Time User? Click Here link in the Sign In box. You will be redirect to the New Member Sign Up page. 3. Enter your [employer] Access Code exactly as it appears below. You will not need to use this code after youve completed the sign-up process. If you do not sign up before the expiration date, you must request a new code. [employer] Access Code: [email or web address]  Expires: 2019  1:23 PM (This is the date your [employer] access code will )    4. Enter the last four digits of your Social Security Number (xxxx) and Date of Birth (mm/dd/yyyy) as indicated and click Submit. You will be taken to the next sign-up page. 5. Create a [employer] ID. This will be your [employer] login ID and cannot be changed, so think of one that is secure and easy to remember. 6. Create a [employer] password. You can change your password at any time. 7. Enter your Password Reset Question and Answer. This can be used at a later time if you forget your password. 8. Enter your e-mail address. You will receive e-mail notification when new information is available in [address] 9. Click Sign Up. You can now view and download portions of your medical record. 10. Click the Download Summary menu link to download a portable copy of your medical information. Additional Information    If you have questions, please visit the Frequently Asked Questions section of the [employer] website at [email or web address] Remember, [employer] is NOT to be used for urgent needs.  For medical emergencies, dial 911.        ___________________________________________________________________________________________________________________________________

## 2019-03-18 NOTE — BH NOTES
Blocked Bed Documentation:    Room number: 726  Type: Behavior  Rationale: Poor Self-Control  Anticipated duration: TBD  Additional comments:N/a

## 2019-03-18 NOTE — PROGRESS NOTES
Problem: Altered Thought Process (Adult/Pediatric)  Goal: *STG: Decreased delusional thinking  Outcome: Progressing Towards Goal  Received pt awake in bed reading a book. Pt had no complaints or concerns. Pt appears calm and he has no signs of distress at this time. Continuing to monitor pt with q15 min safety rounds.

## 2019-03-18 NOTE — PROGRESS NOTES
Problem: Falls - Risk of  Goal: *Absence of Falls  Document Stu Fall Risk and appropriate interventions in the flowsheet. Outcome: Progressing Towards Goal  Fall Risk Interventions: non slip  Socks  Bed in low position  Mobility Interventions: Assess mobility with egress test    Mentation Interventions: Adequate sleep, hydration, pain control    Medication Interventions: Teach patient to arise slowly    Elimination Interventions: Toilet paper/wipes in reach    History of Falls Interventions: Room close to nurse's station        Problem: Altered Thought Process (Adult/Pediatric)  Goal: *STG: Participates in treatment plan  Outcome: Progressing Towards Goal  Pt. Met in treatment team      Anxious  Loose    Some impulsivity  Intrusive with select peers  Goal: *STG: Complies with medication therapy  Outcome: Progressing Towards Goal  Medication compliant   Reviewed in treatment team    Goal: *STG: Attends activities and groups  Outcome: Progressing Towards Goal  Attending groups   Boarded in general unit  Sent back to acute unit because of loose intrusive behavior  Goal: *STG: Decreased delusional thinking  Outcome: Progressing Towards Goal  Some paranoia  Anxiety  Needing verbal re-direction   Goal: Interventions  Outcome: Not Progressing Towards Goal  Will continue to monitor  On 15 min.  Checks for safety  Assess thought process    Lability    Medication compliance  Effectiveness     Encourage groups

## 2019-03-18 NOTE — PROGRESS NOTES
Psychiatric Progress Note    Date: 3/18/2019  Account Number:  [de-identified]  Name: Kenia Brenner    Cc: \"I have issues at home. \"       Patient Active Problem List    Diagnosis Date Noted    Schizophrenia ([address] 03/14/2019     Past Surgical History:   Procedure Laterality Date    HX HERNIA REPAIR      as an infant       Allergies   Allergen Reactions    Aspirin Unknown (comments)      Social History     Tobacco Use    Smoking status: Never Smoker    Smokeless tobacco: Never Used   Substance Use Topics    Alcohol use: No      History reviewed. No pertinent family history. Review of Systems   Positive for hallucination; Appetite:good; Sleep good   Rest of psychiatric review of systems made and considered negative. Medical review of systems made and considered negative. Patient Vitals for the past 8 hrs:   BP Temp Pulse Resp SpO2   03/18/19 0735 113/63 98 °F (36.7 °C) 80 16 100 %       Mental Status exam: WNL except for    Sensorium  oriented to time, place and person   Relations cooperative   Appearance:  age appropriate   Speech:  normal pitch   Thought Process: within normal limits   Thought Content hallucinations   Suicidal ideations none   Homicidal ideations none   Mood:  euthymic   Affect:  full range   Memory recent  adequate   Memory remote:  adequate   Concentration:  adequate   Insight:  poor   Judgment:  poor       Assessment/Plan:     Diagnoses: Active Problems:    Schizophrenia [facility]) (3/14/2019)     3/16/19:   Mr. Tutu Lau states he is feeling better today. He slept over seven hours and has been active in the unit, social with his peers. He denies SI or HI. He had auditory hallucinations briefly but states it seems like it's decreasing. He denies VH. He refused labs this morning. Continue current meds as prescribed. We will closely monitor. Disposition planning. 3/17/19:   Mr. Tutu Lau continues to feel better. He states that his anxiety and auditory hallucinations are improving.  He state he had a mild episode of \"anxiety attack\" earlier this morning because one of his peers was making me nervous. He is requesting to board at the general side. He refused labs this morning, he thought it was not necessary because labs were drawn two days ago. He slept for hours. He denies SI HI or VH. Tolerating his medications and denies side effects. Repeat labs in the morning, he is receptive to this. He can board on the general side. Continue current medications as prescribed. If stable in the next 24 hours, we will plan for discharge in a.m.    3/18/19:  Mr Chatman states he is having a lot of issues at home. He boarded on the general side yesterday but had to be moved back to acute unit due to being inappropriate. He also ref labs again this am. Off meds since June last year. Will dc ativan, start vistaril prn. Didn't sleep much last night which abilify could be contributing. Changed abilify to daytime and increase to 15mg with a now dose of 10mg. Required prn last night. Denies si/hi. Ah continues to get better. We will closely monitor. Cancel discharge this am, and will reevaluate in am.     Psychotherapy provided in regards to pre-admission and current problems. Psychoeducation provided. Treatment plan reviewed with patient, including diagnoses and medications. Case discussed in full with treatment team.  Chart reviewed in full.         Medications:  Current Facility-Administered Medications   Medication Dose Route Frequency    hydrOXYzine HCl (ATARAX) tablet 50 mg  50 mg Oral Q6H PRN    [START ON 3/19/2019] ARIPiprazole (ABILIFY) tablet 15 mg  15 mg Oral DAILY    ARIPiprazole (ABILIFY) tablet 10 mg  10 mg Oral NOW    busPIRone (BUSPAR) tablet 5 mg  5 mg Oral TID    sertraline (ZOLOFT) tablet 50 mg  50 mg Oral DAILY    benztropine (COGENTIN) tablet 1 mg  1 mg Oral BID    QUEtiapine (SEROquel) tablet 50 mg  50 mg Oral QHS PRN    traZODone (DESYREL) tablet 50 mg  50 mg Oral QHS PRN    ziprasidone (GEODON) 20 mg in sterile water (preservative free) 1 mL injection  20 mg IntraMUSCular BID PRN    OLANZapine (ZyPREXA) tablet 5 mg  5 mg Oral Q6H PRN    benztropine (COGENTIN) tablet 2 mg  2 mg Oral BID PRN    benztropine (COGENTIN) injection 2 mg  2 mg IntraMUSCular BID PRN    acetaminophen (TYLENOL) tablet 650 mg  650 mg Oral Q4H PRN    magnesium hydroxide (MILK OF MAGNESIA) 400 mg/5 mL oral suspension 30 mL  30 mL Oral DAILY PRN    nicotine (NICODERM CQ) 21 mg/24 hr patch 1 Patch  1 Patch TransDERmal DAILY PRN         [x]    Medication Changes---see above medication list for details  Will continue to titrate/adjust medications as tolerated and to response. Side Effects:  none      \pardI certify that this patient's inpatient psychiatric hospital services furnished since the previous certification were, and continue to be, required for treatment that could reasonably be expected to improve the patient's condition, or for diagnostic study, and that the patient continues to need, on a daily basis, active treatment furnished directly by or requiring the supervision of inpatient psychiatric facility personnel. In addition, the hospital records show that services furnished were intensive treatment services, admission or related services, or equivalent services.         Signed By: Twin Estrada NP
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
## 2019-03-18 NOTE — PROGRESS NOTES
Problem: Altered Thought Process (Adult/Pediatric)  Goal: *STG: Attends activities and groups  Outcome: Progressing Towards Goal  Will contine encouragement of attending group.   Will continue pt education regarding

## 2019-03-18 NOTE — BH NOTES
PRN Medication Documentation    Specific patient behavior that led to need for PRN medication: c/o restlessness, increased anxiety  Staff interventions attempted prior to PRN being given: therapeutic communication, relaxation techniques  PRN medication given: PO Ativan  Patient response/effectiveness of PRN medication: pt quietly lying in bed.    0400 Patient quietly resting in bed.

## 2019-03-18 NOTE — BH NOTES
GROUP THERAPY PROGRESS NOTE    The patient Haja Deshpande is participating in [employer]. Group time: 30 minutes    Personal goal for participation: to orient the patient to the unit.     Goal orientation: successful adoption of unit rules    Group therapy participation: active    Therapeutic interventions reviewed and discussed: Yes    Impression of participation:     Dea Salvador  3/18/2019 10:42 AM

## 2019-03-18 NOTE — BH NOTES
Pt pacing in back day room as if unable to sit still. Patient has been constantly moving throughout shift. When asked about his hyperactivity, he smiles, is calm/cooperative and states \"There just anywhere to go to walk.  I like to walk\"

## 2019-03-18 NOTE — BH NOTES
GROUP THERAPY PROGRESS NOTE    Meño Iniguez participated in the Acute Unit's afternoon Process Group with a focus   on identifying feelings, planning for the rest of the day, and preparing for discharge. Group time: 35 minutes. Personal goal for participation: To increase the capacity to improve ones mood and structure. Goal orientation: The patient will be able to identify their feelings, develop a plan for structuring their day, and discharge planning. Group therapy participation: With prompting, this patient partially participated in the group. Therapeutic interventions reviewed and discussed: The group members were asked to introduce   themselves to each other and to see if they could identify an emotion they are having and/or let the group know what they want to focus on for the rest of the day as they continue to make discharge plans. Impression of participation: The patient said he \"just wanted to get through the day. ..okay. ..and disappointed,\" because he could not leave the hospital today. He was alert, generally oriented, and left the group to go towards his room soon after his brief sharing. The patient expressed no current SI/HI and it was not clear if he was harboring any delusional thought about his treatment. His affect was anxious and suspicious of his treatment team. His mood matched his affect. His inability to stay focused and his moodiness made if difficult for him to complete this group.
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
## 2019-03-18 NOTE — PROGRESS NOTES
Laboratory Monitoring for Antipsychotics: This patient is currently prescribed the following medication(s):   Current Facility-Administered Medications   Medication Dose Route Frequency    busPIRone (BUSPAR) tablet 5 mg  5 mg Oral TID    sertraline (ZOLOFT) tablet 50 mg  50 mg Oral DAILY    ARIPiprazole (ABILIFY) tablet 5 mg  5 mg Oral BID    benztropine (COGENTIN) tablet 1 mg  1 mg Oral BID     The following labs have been completed for monitoring of antipsychotics and/or mood stabilizers:    Height, Weight, BMI Estimation  Estimated body mass index is 18.28 kg/m² as calculated from the following:    Height as of this encounter: 193 cm (76\"). Weight as of this encounter: 68.1 kg (150 lb 3.2 oz). Vital Signs/Blood Pressure  Visit Vitals  /63 (BP 1 Location: Left arm, BP Patient Position: Sitting)   Pulse 80   Temp 98 °F (36.7 °C)   Resp 16   Ht 193 cm (76\")   Wt 68.1 kg (150 lb 3.2 oz)   SpO2 100%   BMI 18.28 kg/m²     Renal Function, Hepatic Function and Chemistry  Estimated Creatinine Clearance: 103.4 mL/min (based on SCr of 1.07 mg/dL). Lab Results   Component Value Date/Time    Sodium 139 03/14/2019 03:27 PM    Potassium 3.9 03/14/2019 03:27 PM    Chloride 105 03/14/2019 03:27 PM    CO2 31 03/14/2019 03:27 PM    Anion gap 3 (L) 03/14/2019 03:27 PM    BUN 10 03/14/2019 03:27 PM    Creatinine 1.07 03/14/2019 03:27 PM    BUN/Creatinine ratio 9 (L) 03/14/2019 03:27 PM    Bilirubin, total 0.5 03/14/2019 03:27 PM    Protein, total 7.7 03/14/2019 03:27 PM    Albumin 4.2 03/14/2019 03:27 PM    Globulin 3.5 03/14/2019 03:27 PM    A-G Ratio 1.2 03/14/2019 03:27 PM    ALT (SGPT) 27 03/14/2019 03:27 PM    Alk.  phosphatase 130 (H) 03/14/2019 03:27 PM     Lab Results   Component Value Date/Time    Glucose 88 03/14/2019 03:27 PM     No results found for: HBA1C, HGBE8, QFF1LOPB    Hematology  Lab Results   Component Value Date/Time    WBC 7.0 03/14/2019 03:27 PM    RBC 4.90 03/14/2019 03:27 PM    HGB 14.5 03/14/2019 03:27 PM    HCT 44.7 03/14/2019 03:27 PM    MCV 91.2 03/14/2019 03:27 PM    MCH 29.6 03/14/2019 03:27 PM    MCHC 32.4 03/14/2019 03:27 PM    RDW 12.2 03/14/2019 03:27 PM    PLATELET 938 20/33/4609 03:27 PM     Lipids  No results found for: CHOL, CHOLX, CHLST, CHOLV, 465674, HDL, LDL, LDLC, DLDLP, TGLX, TRIGL, TRIGP, CHHD, CHHDX    Thyroid Function  No results found for: TSH, TSH2, TSH3, TSHP, TSHELE, TT3, T3U, T3UP, FRT3, FT3, FT4, FT4P, T4, T4P, FT4T, TT7, TSHEXT    Assessment/Plan:  Will order reorder lipid panel and hemoglobin A1c (patient has refused labs x2 days) to complete the recommended baseline laboratory monitoring based on the patient's current medication regimen.       David Melgar, PharmD, BCPP, [facility] Specialist, [address] [place] [address]

## 2019-03-19 VITALS
WEIGHT: 150.2 LBS | HEART RATE: 102 BPM | BODY MASS INDEX: 18.29 KG/M2 | OXYGEN SATURATION: 99 % | TEMPERATURE: 97.9 F | SYSTOLIC BLOOD PRESSURE: 135 MMHG | HEIGHT: 76 IN | RESPIRATION RATE: 16 BRPM | DIASTOLIC BLOOD PRESSURE: 68 MMHG

## 2019-03-19 LAB
CHOLEST SERPL-MCNC: 117 MG/DL
EST. AVERAGE GLUCOSE BLD GHB EST-MCNC: 108 MG/DL
HBA1C MFR BLD: 5.4 % (ref 4.2–6.3)
HDLC SERPL-MCNC: 69 MG/DL
HDLC SERPL: 1.7 {RATIO} (ref 0–5)
LDLC SERPL CALC-MCNC: 34.4 MG/DL (ref 0–100)
LIPID PROFILE,FLP: NORMAL
TRIGL SERPL-MCNC: 68 MG/DL (ref ?–150)
VLDLC SERPL CALC-MCNC: 13.6 MG/DL

## 2019-03-19 PROCEDURE — 83036 HEMOGLOBIN GLYCOSYLATED A1C: CPT

## 2019-03-19 PROCEDURE — 36415 COLL VENOUS BLD VENIPUNCTURE: CPT

## 2019-03-19 PROCEDURE — 80061 LIPID PANEL: CPT

## 2019-03-19 PROCEDURE — 74011250637 HC RX REV CODE- 250/637: Performed by: NURSE PRACTITIONER

## 2019-03-19 RX ORDER — SERTRALINE HYDROCHLORIDE 50 MG/1
50 TABLET, FILM COATED ORAL DAILY
Qty: 30 TAB | Refills: 0 | Status: ON HOLD | OUTPATIENT
Start: 2019-03-20 | End: 2021-01-26 | Stop reason: SDUPTHER

## 2019-03-19 RX ORDER — HYDROXYZINE 50 MG/1
50 TABLET, FILM COATED ORAL
Qty: 30 TAB | Refills: 0 | Status: SHIPPED | OUTPATIENT
Start: 2019-03-19 | End: 2019-03-29

## 2019-03-19 RX ORDER — TRAZODONE HYDROCHLORIDE 50 MG/1
50 TABLET ORAL
Qty: 30 TAB | Refills: 0 | Status: SHIPPED | OUTPATIENT
Start: 2019-03-19 | End: 2019-05-07

## 2019-03-19 RX ORDER — BUSPIRONE HYDROCHLORIDE 5 MG/1
5 TABLET ORAL 3 TIMES DAILY
Qty: 90 TAB | Refills: 0 | Status: SHIPPED | OUTPATIENT
Start: 2019-03-19 | End: 2019-09-05 | Stop reason: SDUPTHER

## 2019-03-19 RX ORDER — ARIPIPRAZOLE 15 MG/1
15 TABLET ORAL DAILY
Qty: 30 TAB | Refills: 0 | Status: SHIPPED | OUTPATIENT
Start: 2019-03-20 | End: 2019-09-05 | Stop reason: DRUGHIGH

## 2019-03-19 RX ADMIN — ARIPIPRAZOLE 15 MG: 5 TABLET ORAL at 08:06

## 2019-03-19 RX ADMIN — SERTRALINE HYDROCHLORIDE 50 MG: 50 TABLET ORAL at 08:06

## 2019-03-19 RX ADMIN — BENZTROPINE MESYLATE 1 MG: 1 TABLET ORAL at 08:06

## 2019-03-19 RX ADMIN — BUSPIRONE HYDROCHLORIDE 5 MG: 5 TABLET ORAL at 08:06

## 2019-03-19 NOTE — PROGRESS NOTES
Problem: Falls - Risk of  Goal: *Absence of Falls  Document Stu Fall Risk and appropriate interventions in the flowsheet. Outcome: Progressing Towards Goal  Fall Risk Interventions:  Patient has been fall free since arriving on the acute unit. He is currently sleeping no stress noted. Mobility Interventions: Assess mobility with egress test    Mentation Interventions: Adequate sleep, hydration, pain control    Medication Interventions: Teach patient to arise slowly    Elimination Interventions: Toilet paper/wipes in reach    History of Falls Interventions: Room close to nurse's station       Blocked Bed Documentation:    Room number: 726/2  Type: Behavior  Rationale: Poor Self-Control  Anticipated duration: reevaluated daily  Additional comments:intrusive.

## 2019-03-19 NOTE — PROGRESS NOTES
Pharmacist Discharge Medication Reconciliation Discharging Provider: Kevin Enriquez Significant PMH:  
Past Medical History:  
Diagnosis Date  Asperger syndrome  Schizophrenia ([facility] [address]  Scoliosis Chief Complaint for this Admission: Chief Complaint Patient presents with  Suicidal  
 
Allergies: Aspirin Discharge Medications:  
Current Discharge Medication List  
  
 
START taking these medications Details ARIPiprazole (ABILIFY) 15 mg tablet Take 1 Tab by mouth daily. Qty: 30 Tab, Refills: 0  
  
busPIRone (BUSPAR) 5 mg tablet Take 1 Tab by mouth three (3) times daily. Qty: 90 Tab, Refills: 0  
  
hydrOXYzine HCl (ATARAX) 50 mg tablet Take 1 Tab by mouth every six (6) hours as needed (anxiety) for up to 10 days. Qty: 30 Tab, Refills: 0  
  
sertraline (ZOLOFT) 50 mg tablet Take 1 Tab by mouth daily. Qty: 30 Tab, Refills: 0  
  
traZODone (DESYREL) 50 mg tablet Take 1 Tab by mouth nightly as needed for Sleep ((1st line)). Qty: 30 Tab, Refills: 0 The patient's chart, MAR and AVS were reviewed by David Melgar, COLLEEND.

## 2019-03-19 NOTE — BH NOTES
Blocked Bed Documentation: 
 
Room number: 331 Type: Behavior Rationale: Intrusive Anticipated duration: duration of stay Additional comments: poor boundaries

## 2019-03-19 NOTE — BH NOTES
GROUP THERAPY PROGRESS NOTE Caorlyn Kramer participated in the Acute Unit's afternoon Process Group with a focus  
on identifying feelings, planning for the rest of the day, and preparing for discharge. Group time: 25 minutes. Personal goal for participation: To increase the capacity to improve ones mood and structure. Goal orientation: The patient will be able to identify their feelings, develop a plan for structuring their day, and discharge planning. Group therapy participation: With prompting, this patient actively participated in the group. Therapeutic interventions reviewed and discussed: The group members were asked to introduce  
themselves to each other and to see if they could identify an emotion they are having and/or let the group know what they want to focus on for the rest of the day as they continue to make discharge plans. Impression of participation: The patient said he was waiting for his mother to come and pick him up, because he was being discharged later this afternoon. He also mentioned that he would ask a staff member to help him locate a telephone number for the [facility] or similar shelter to provide an alternative to his home with his mother, if he should need it or want to take it. He was alert and generally oriented. He expressed no current SI/HI and displayed no overt psychotic symptoms in this group. He interacted appropriately with his peers, aside from getting up and checking with staff a couple of times to see if his mother had arrived. He was called out of group about five minutes before the end of group to finalize his discharge plans with nursing. His affect was anxious but mostly non-dysphoric. His mood mirrored his affect. He seemed to be thinking more clearly than on admission and in not as much emotional distress as he was when he first entered the unit.

## 2019-03-19 NOTE — BH NOTES
GROUP THERAPY PROGRESS NOTE Rollo Severe is participating in [place]. Group time: 15 minutes Personal goal for participation: client expressed that his goal was to be discharged today. Goal orientation: community Group therapy participation: active Therapeutic interventions reviewed and discussed: yes Impression of participation: engaged
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
## 2019-03-19 NOTE — BH NOTES
Behavioral Health Transition Record to Provider Patient Name: Soraida Segovia YOB: 1995 Medical Record Number: [id number] Date of Admission: 3/14/2019 Date of Discharge: 3/19/2019 Attending Provider: Suma Sharpe MD 
Discharging Provider: Adrienne Bentley NP To contact this individual call [phone] and ask the  to page. If unavailable, ask to be transferred to [facility] Provider on call. [facility] Provider will be available on call 24/7 and during holidays. Primary Care Provider: None Allergies Allergen Reactions  Aspirin Unknown (comments) Reason for Admission: \"I have thoughts of wanting to die. \" Admission Diagnosis: Schizophrenia ([facility] [address] [F20.9] * No surgery found * Results for orders placed or performed during the hospital encounter of 03/14/19 URINE CULTURE HOLD SAMPLE Result Value Ref Range Urine culture hold URINE ON HOLD IN MICROBIOLOGY DEPT FOR 3 DAYS. IF UNPRESERVED URINE IS SUBMITTED, IT CANNOT BE USED FOR ADDITIONAL TESTING AFTER 24 HRS, RECOLLECTION WILL BE REQUIRED. CBC WITH AUTOMATED DIFF Result Value Ref Range WBC 7.0 4.1 - 11.1 K/uL  
 RBC 4.90 4. 10 - 5.70 M/uL  
 HGB 14.5 12.1 - 17.0 g/dL HCT 44.7 36.6 - 50.3 % MCV 91.2 80.0 - 99.0 FL  
 MCH 29.6 26.0 - 34.0 PG  
 MCHC 32.4 30.0 - 36.5 g/dL  
 RDW 12.2 11.5 - 14.5 % PLATELET 462 762 - 812 K/uL MPV 9.8 8.9 - 12.9 FL  
 NRBC 0.0 0  WBC ABSOLUTE NRBC 0.00 0.00 - 0.01 K/uL NEUTROPHILS 77 (H) 32 - 75 % LYMPHOCYTES 17 12 - 49 % MONOCYTES 6 5 - 13 % EOSINOPHILS 0 0 - 7 % BASOPHILS 0 0 - 1 % IMMATURE GRANULOCYTES 0 0.0 - 0.5 % ABS. NEUTROPHILS 5.3 1.8 - 8.0 K/UL  
 ABS. LYMPHOCYTES 1.2 0.8 - 3.5 K/UL  
 ABS. MONOCYTES 0.4 0.0 - 1.0 K/UL  
 ABS. EOSINOPHILS 0.0 0.0 - 0.4 K/UL  
 ABS. BASOPHILS 0.0 0.0 - 0.1 K/UL  
 ABS. IMM. GRANS. 0.0 0.00 - 0.04 K/UL  
 DF AUTOMATED METABOLIC PANEL, COMPREHENSIVE Result Value Ref Range Sodium 139 136 - 145 mmol/L Potassium 3.9 3.5 - 5.1 mmol/L Chloride 105 97 - 108 mmol/L  
 CO2 31 21 - 32 mmol/L Anion gap 3 (L) 5 - 15 mmol/L Glucose 88 65 - 100 mg/dL BUN 10 6 - 20 MG/DL Creatinine 1.07 0.70 - 1.30 MG/DL  
 BUN/Creatinine ratio 9 (L) 12 - 20 GFR est AA >60 >60 ml/min/1.73m2 GFR est non-AA >60 >60 ml/min/1.73m2 Calcium 9.3 8.5 - 10.1 MG/DL Bilirubin, total 0.5 0.2 - 1.0 MG/DL  
 ALT (SGPT) 27 12 - 78 U/L  
 AST (SGOT) 15 15 - 37 U/L Alk. phosphatase 130 (H) 45 - 117 U/L Protein, total 7.7 6.4 - 8.2 g/dL Albumin 4.2 3.5 - 5.0 g/dL Globulin 3.5 2.0 - 4.0 g/dL A-G Ratio 1.2 1.1 - 2.2 SALICYLATE Result Value Ref Range Salicylate level <3.9 (L) 2.8 - 20.0 MG/DL URINALYSIS W/MICROSCOPIC Result Value Ref Range Color YELLOW/STRAW Appearance CLEAR CLEAR Specific gravity 1.010 1.003 - 1.030    
 pH (UA) 7.0 5.0 - 8.0 Protein NEGATIVE  NEG mg/dL Glucose NEGATIVE  NEG mg/dL Ketone NEGATIVE  NEG mg/dL Bilirubin NEGATIVE  NEG Blood TRACE (A) NEG Urobilinogen 0.2 0.2 - 1.0 EU/dL Nitrites NEGATIVE  NEG Leukocyte Esterase NEGATIVE  NEG    
 WBC 0-4 0 - 4 /hpf  
 RBC 0-5 0 - 5 /hpf Epithelial cells FEW FEW /lpf Bacteria NEGATIVE  NEG /hpf DRUG SCREEN, URINE Result Value Ref Range AMPHETAMINES NEGATIVE  NEG    
 BARBITURATES NEGATIVE  NEG BENZODIAZEPINES NEGATIVE  NEG    
 COCAINE NEGATIVE  NEG METHADONE NEGATIVE  NEG    
 OPIATES NEGATIVE  NEG    
 PCP(PHENCYCLIDINE) NEGATIVE  NEG    
 THC (TH-CANNABINOL) NEGATIVE  NEG Drug screen comment (NOTE) ETHYL ALCOHOL Result Value Ref Range ALCOHOL(ETHYL),SERUM <10 <10 MG/DL  
ACETAMINOPHEN Result Value Ref Range Acetaminophen level <2 (L) 10 - 30 ug/mL LIPID PANEL Result Value Ref Range LIPID PROFILE  Cholesterol, total 117 <200 MG/DL  
 Triglyceride 68 <150 MG/DL  
 HDL Cholesterol 69 MG/DL  
 LDL, calculated 34.4 0 - 100 MG/DL VLDL, calculated 13.6 MG/DL  
 CHOL/HDL Ratio 1.7 0.0 - 5.0 HEMOGLOBIN A1C WITH EAG Result Value Ref Range Hemoglobin A1c 5.4 4.2 - 6.3 % Est. average glucose 108 mg/dL Immunizations administered during this encounter: There is no immunization history on file for this patient. Screening for Metabolic Disorders for Patients on Antipsychotic Medications 
(Data obtained from the EMR) Estimated Body Mass Index Estimated body mass index is 18.28 kg/m² as calculated from the following: 
  Height as of this encounter: 6' 4\" (1.93 m). Weight as of this encounter: 68.1 kg (150 lb 3.2 oz). Vital Signs/Blood Pressure Visit Vitals /68 Pulse (!) 102 Temp 97.9 °F (36.6 °C) Resp 16 Ht 6' 4\" (1.93 m) Wt 68.1 kg (150 lb 3.2 oz) SpO2 99% BMI 18.28 kg/m² Blood Glucose/Hemoglobin A1c Lab Results Component Value Date/Time Glucose 88 03/14/2019 03:27 PM  
 
Lab Results Component Value Date/Time Hemoglobin A1c 5.4 03/19/2019 05:55 AM  
 
  
Lipid Panel Lab Results Component Value Date/Time Cholesterol, total 117 03/19/2019 05:55 AM  
 HDL Cholesterol 69 03/19/2019 05:55 AM  
 LDL, calculated 34.4 03/19/2019 05:55 AM  
 Triglyceride 68 03/19/2019 05:55 AM  
 CHOL/HDL Ratio 1.7 03/19/2019 05:55 AM  
 
  
Discharge Diagnosis: Schizophrenia Discharge Plan: He will return home with his mother. The patient Yimi Fitzgerald exhibits the ability to control behavior in a less restrictive environment. Patient's level of functioning is improving. No assaultive/destructive behavior has been observed for the past 24 hours. No suicidal/homicidal threat or behavior has been observed for the past 24 hours. There is no evidence of serious medication side effects. Patient has not been in physical or protective restraints for at least the past 24 hours. If weapons involved, how are they secured? No weapons involved Is patient aware of and in agreement with discharge plan? Patient is aware of discharge and is in agreement Arrangements for medication:  Prescriptions given to patient. Copy of discharge instructions to  provider?:  Yes, fax to [phone] Arrangements for transportation home:  Mother to  Keep all follow up appointments as scheduled, continue to take prescribed medications per physician instructions. Mental health crisis number:  125 or your local mental health crisis line number at [phone] Discharge Medication List and Instructions:  
Current Discharge Medication List  
  
START taking these medications Details ARIPiprazole (ABILIFY) 15 mg tablet Take 1 Tab by mouth daily. Qty: 30 Tab, Refills: 0  
  
busPIRone (BUSPAR) 5 mg tablet Take 1 Tab by mouth three (3) times daily. Qty: 90 Tab, Refills: 0  
  
hydrOXYzine HCl (ATARAX) 50 mg tablet Take 1 Tab by mouth every six (6) hours as needed (anxiety) for up to 10 days. Qty: 30 Tab, Refills: 0  
  
sertraline (ZOLOFT) 50 mg tablet Take 1 Tab by mouth daily. Qty: 30 Tab, Refills: 0  
  
traZODone (DESYREL) 50 mg tablet Take 1 Tab by mouth nightly as needed for Sleep ((1st line)). Qty: 30 Tab, Refills: 0 Unresulted Labs (24h ago, onward) None To obtain results of studies pending at discharge, please contact [phone] Follow-up Information Follow up With Specialties Details Why Contact Info [address]  On 3/19/2019 you can walk in for same day access Monday - Wednesday 7:30 to 3:00 Thursday 7:30 to 5:30 and Friday 7:30 to 12:00 Please allow for a 2 hour assessment [place] location [address]  [address] 
[phone] None    None (395) Patient stated that they have no PCP Advanced Directive: Does the patient have an appointed surrogate decision maker? No 
Does the patient have a Medical Advance Directive? No 
Does the patient have a Psychiatric Advance Directive? No 
If the patient does not have a surrogate or Medical Advance Directive AND Psychiatric Advance Directive, the patient was offered information on these advance directives Patient declined to complete Patient Instructions: Please continue all medications until otherwise directed by physician. Tobacco Cessation Discharge Plan:  
Is the patient a smoker and needs referral for smoking cessation? No 
Patient referred to the following for smoking cessation with an appointment? No    
Patient was offered medication to assist with smoking cessation at discharge? No 
Was education for smoking cessation added to the discharge instructions? Yes Alcohol/Substance Abuse Discharge Plan:  
Does the patient have a history of substance/alcohol abuse and requires a referral for treatment? No 
Patient referred to the following for substance/alcohol abuse treatment with an appointment? No 
Patient was offered medication to assist with alcohol cessation at discharge? No 
Was education for substance/alcohol abuse added to discharge instructions? No 
 
Patient discharged to Home; discussed with patient/caregiver and provided to the patient/caregiver either in hard copy or electronically.

## 2019-03-19 NOTE — PROGRESS NOTES
Problem: Falls - Risk of  Goal: *Absence of Falls  Document Stu Fall Risk and appropriate interventions in the flowsheet. Outcome: Progressing Towards Goal  Fall Risk Interventions:  Non slip socks  Bed in low position  Mobility Interventions: Assess mobility with egress test    Mentation Interventions: Adequate sleep, hydration, pain control    Medication Interventions: Teach patient to arise slowly    Elimination Interventions: Toilet paper/wipes in reach    History of Falls Interventions: Room close to nurse's station        Problem: Altered Thought Process (Adult/Pediatric)  Goal: *STG: Participates in treatment plan  Outcome: Progressing Towards Goal  Pt. Met in treatment team   Remains loose  But less intrusive      Discussed possible discharge  Home today slept 3.75 hours  Goal: *STG: Complies with medication therapy  Outcome: Progressing Towards Goal  Medication compliant    Reviewed in treatment team   Goal: *STG: Decreased delusional thinking  Outcome: Progressing Towards Goal  Less delusional thinking  No paranoia exhibited  Goal: Interventions  Outcome: Progressing Towards Goal  jessica continue to monitor on 15 min.  Checks for safety  Assess thought process   Medication compliance   Effectiveness  Encourage groups

## 2019-03-19 NOTE — INTERDISCIPLINARY ROUNDS
Behavioral Health Interdisciplinary Rounds     Patient Name: Isis Beavers  Age: 21 y.o. Room/Bed:  6/  Primary Diagnosis: <principal problem not specified>   Admission Status: Voluntary     Readmission within 30 days: no  Power of  in place: no  Patient requires a blocked bed: yes          Reason for blocked bed: behavior    VTE Prophylaxis: No    Mobility needs/Fall risk: no  Flu Vaccine : no   Nutritional Plan: no  Consults:          Labs/Testing due today?: yes    Sleep hours:  3.45      Participation in Care/Groups:  yes  Medication Compliant?: Yes  PRNS (last 24 hours): None    Restraints (last 24 hours):  no     CIWA (range last 24 hours):     COWS (range last 24 hours):      Alcohol screening (AUDIT) completed -   AUDIT Score: 0     If applicable, date SBIRT discussed in treatment team AND documented:   AUDIT Screen Score: AUDIT Score: 0      24 hour chart check complete: yes     Patient goal(s) for today:   Treatment team focus/goals: Plan for discharge today   Progress note : plan for discharge today     LOS:  5  Expected LOS: Plan for discharge today. Financial concerns/prescription coverage:  no  Date of last family contact:       Family requesting physician contact today:  no  Discharge plan: plan for discharge today.    Guns in the home: no        Outpatient provider(s): [address] [place] [address]      Participating treatment team members: Isis Beavers,  Nohemi Jacinto NP- Venecia Christianson, Araceli Benoit RN

## 2019-03-19 NOTE — BH NOTES
GROUP THERAPY PROGRESS NOTE    Lisadeloris Anamaria is participating in Reflections. Group time: 20 minutes    Personal goal for participation: unit orientation and daily progress    Goal orientation: personal    Group therapy participation: active    Therapeutic interventions reviewed and discussed: \"Red Flags\" and \"Relaxation\" word search. Impression of participation: Active in group discussion and fairly appropriate in his comments but was somewhat hyper-verbal and redirected for carrying on side conversation with female peer.

## 2019-03-19 NOTE — PROGRESS NOTES
Laboratory Monitoring for Antipsychotics: This patient is currently prescribed the following medication(s):   Current Facility-Administered Medications   Medication Dose Route Frequency    ARIPiprazole (ABILIFY) tablet 15 mg  15 mg Oral DAILY    busPIRone (BUSPAR) tablet 5 mg  5 mg Oral TID    sertraline (ZOLOFT) tablet 50 mg  50 mg Oral DAILY    benztropine (COGENTIN) tablet 1 mg  1 mg Oral BID     The following labs have been completed for monitoring of antipsychotics and/or mood stabilizers:    Height, Weight, BMI Estimation  Estimated body mass index is 18.28 kg/m² as calculated from the following:    Height as of this encounter: 6' 4\" (1.93 m). Weight as of this encounter: 68.1 kg (150 lb 3.2 oz). Vital Signs/Blood Pressure  Visit Vitals  /74 (BP Patient Position: Sitting)   Pulse 79   Temp 98.5 °F (36.9 °C)   Resp 18   Ht 6' 4\" (1.93 m)   Wt 68.1 kg (150 lb 3.2 oz)   SpO2 99%   BMI 18.28 kg/m²     Renal Function, Hepatic Function and Chemistry  Estimated Creatinine Clearance: 103.4 mL/min (based on SCr of 1.07 mg/dL). Lab Results   Component Value Date/Time    Sodium 139 03/14/2019 03:27 PM    Potassium 3.9 03/14/2019 03:27 PM    Chloride 105 03/14/2019 03:27 PM    CO2 31 03/14/2019 03:27 PM    Anion gap 3 (L) 03/14/2019 03:27 PM    BUN 10 03/14/2019 03:27 PM    Creatinine 1.07 03/14/2019 03:27 PM    BUN/Creatinine ratio 9 (L) 03/14/2019 03:27 PM    Bilirubin, total 0.5 03/14/2019 03:27 PM    Protein, total 7.7 03/14/2019 03:27 PM    Albumin 4.2 03/14/2019 03:27 PM    Globulin 3.5 03/14/2019 03:27 PM    A-G Ratio 1.2 03/14/2019 03:27 PM    ALT (SGPT) 27 03/14/2019 03:27 PM    Alk.  phosphatase 130 (H) 03/14/2019 03:27 PM     Lab Results   Component Value Date/Time    Glucose 88 03/14/2019 03:27 PM     Lab Results   Component Value Date/Time    Hemoglobin A1c 5.4 03/19/2019 05:55 AM     Hematology  Lab Results   Component Value Date/Time    WBC 7.0 03/14/2019 03:27 PM    RBC 4.90 03/14/2019 03:27 PM    HGB 14.5 03/14/2019 03:27 PM    HCT 44.7 03/14/2019 03:27 PM    MCV 91.2 03/14/2019 03:27 PM    MCH 29.6 03/14/2019 03:27 PM    MCHC 32.4 03/14/2019 03:27 PM    RDW 12.2 03/14/2019 03:27 PM    PLATELET 012 23/49/4148 03:27 PM     Lipids  Lab Results   Component Value Date/Time    Cholesterol, total 117 03/19/2019 05:55 AM    HDL Cholesterol 69 03/19/2019 05:55 AM    LDL, calculated 34.4 03/19/2019 05:55 AM    Triglyceride 68 03/19/2019 05:55 AM    CHOL/HDL Ratio 1.7 03/19/2019 05:55 AM     Thyroid Function  No results found for: TSH, TSH2, TSH3, TSHP, TSHELE, TT3, T3U, T3UP, FRT3, FT3, FT4, FT4P, T4, T4P, FT4T, TT7, TSHEXT    Assessment/Plan:  Recommended baseline laboratory monitoring has been completed based on this patient's current medication regimen. Recommend follow-up metabolic monitoring in 3 months.      Viktoriya Quiroz, PharmD, BCPP, [facility], [facility]

## 2019-03-19 NOTE — BH NOTES
PRN Medication Documentation    Specific patient behavior that led to need for PRN medication: sl restless,poor boundaries,implusive  Staff interventions attempted prior to PRN being given: redirection  PRN medication given:atarax  Patient response/effectiveness of PRN medication: good,tl aware

## 2019-03-23 NOTE — DISCHARGE SUMMARY
PSYCHIATRIC DISCHARGE SUMMARY         IDENTIFICATION:    Patient Name  Valentine Green   Date of Birth 1995   [facility] [id number]   Medical Record Number  [id number]      Age  21 y.o. PCP None   Admit date:  3/14/2019    Discharge date: 3/23/2019   Room Number  726/01  @ [facility]   Date of Service  3/23/2019            TYPE OF DISCHARGE: REGULAR               CONDITION AT DISCHARGE: good       PROVISIONAL & DISCHARGE DIAGNOSES:    Problem List  Never Reviewed          Codes Class    Schizophrenia ([facility] 75.) ICD-10-CM: F20.9  ICD-9-CM: 295.90               Active Hospital Problems    Schizophrenia ([facility]        DISCHARGE DIAGNOSIS:   Schizoaffective disorder, depressed type. CC & HISTORY OF PRESENT ILLNESS:  HISTORY OF PRESENTING ILLNESS:  The patient is a 30-year-old male who is admitted to [facility] on a voluntary basis. He states that he was diagnosed with Asperger's at age 8 and subsequently was diagnosed with schizophrenia. He states that he started battling depression when he was 6years old and it kept worsening. He had thoughts of suicidal ideation, but he never acted on it. He is endorsing auditory hallucinations, voices wanting to kill him; visual hallucinations, seeing regular people that are not in this world that  he can only see. He states that his mood can get unpredictable, can get acutely psychotic, can get angry beating and cursing. He states that he responds based on what the voices tell him and his life is in jeopardy because of these voices. According to the admission note, he has been having thoughts of suicidal ideation for \"months. \"  He denied any plan. He states that the voices have been increasingly worsening, and the voice bullies him. He has been having these episodes where he will try to fight the voices. He currently denies suicidal ideation or homicidal ideation, but is endorsing auditory and visual hallucinations.  He has been off his medication for the past 2 months.     PAST MEDICAL HISTORY:  See H and P.     PAST PSYCHIATRIC HOSPITALIZATION:  He states that he was admitted at [facility] when he was 16years old. He states that he has been diagnosed with schizophrenia and Asperger's. He is currently not seeing a psychiatrist since he recently moved from [place] two months ago.     PSYCHOSOCIAL HISTORY:  He is single. He has no children. He finished high school. He is currently unemployed. He is currently receiving social security disability checks. SOCIAL HISTORY:    Social History     Socioeconomic History    Marital status: SINGLE     Spouse name: Not on file    Number of children: Not on file    Years of education: Not on file    Highest education level: Not on file   Occupational History    Not on file   Social Needs    Financial resource strain: Not on file    Food insecurity:     Worry: Not on file     Inability: Not on file    Transportation needs:     Medical: Not on file     Non-medical: Not on file   Tobacco Use    Smoking status: Never Smoker    Smokeless tobacco: Never Used   Substance and Sexual Activity    Alcohol use: No    Drug use: No    Sexual activity: Not on file   Lifestyle    Physical activity:     Days per week: Not on file     Minutes per session: Not on file    Stress: Not on file   Relationships    Social connections:     Talks on phone: Not on file     Gets together: Not on file     Attends Orthodoxy service: Not on file     Active member of club or organization: Not on file     Attends meetings of clubs or organizations: Not on file     Relationship status: Not on file    Intimate partner violence:     Fear of current or ex partner: Not on file     Emotionally abused: Not on file     Physically abused: Not on file     Forced sexual activity: Not on file   Other Topics Concern    Not on file   Social History Narrative    Not on file      FAMILY HISTORY:   History reviewed. No pertinent family history. HOSPITALIZATION COURSE:    Kenia Brenner was admitted to the inpatient psychiatric unit [address] for acute psychiatric stabilization in regards to symptomatology as described in the HPI above. The differential diagnosis at time of admission included: schizoaffective vs. Schizophrenia. While on the unit Kenia Brenner was involved in individual, group, occupational and milieu therapy. Psychiatric medications were adjusted during this hospitalization. Kenia Brenner demonstrated a slow, but progressive improvement in overall condition. Much of patient's depression appeared to be related to situational stressors and psychological factors. Please see individual progress notes for more specific details regarding patient's hospitalization course. At time of discharge, Kenia Brenner is without significant problems of depression or psychosis. Patient free of suicidal and homicidal ideations (appears to be at very low risk of suicide or homicide) and reports many positive predictive factors in terms of not attempting suicide or homicide. Overall presentation at time of discharge is most consistent with the diagnosis of schizoaffective disorder. Patient psychiatrically stable for discharge today. All members of the treatment team concur with each other in regards to plans for discharge today. Patient and family are aware and in agreement with discharge and discharge plan. Per my last note: Mr JANETTE Shaffer states he is having a lot of issues at home. He boarded on the general side yesterday but had to be moved back to acute unit due to being inappropriate. He also ref labs again this am. Off meds since June last year. Will dc ativan, start vistaril prn. Didn't sleep much last night which abilify could be contributing. Changed abilify to daytime and increase to 15mg with a now dose of 10mg. Required prn last night. Denies si/hi. Ah continues to get better.  We will closely monitor. Cancel discharge this am, and will reevaluate in am.         LABS AND IMAGAING:    Labs Reviewed   CBC WITH AUTOMATED DIFF - Abnormal; Notable for the following components:       Result Value    NEUTROPHILS 77 (*)     All other components within normal limits   METABOLIC PANEL, COMPREHENSIVE - Abnormal; Notable for the following components:    Anion gap 3 (*)     BUN/Creatinine ratio 9 (*)     Alk. phosphatase 130 (*)     All other components within normal limits   SALICYLATE - Abnormal; Notable for the following components:    Salicylate level <2.0 (*)     All other components within normal limits   URINALYSIS W/MICROSCOPIC - Abnormal; Notable for the following components:    Blood TRACE (*)     All other components within normal limits   ACETAMINOPHEN - Abnormal; Notable for the following components:    Acetaminophen level <2 (*)     All other components within normal limits   URINE CULTURE HOLD SAMPLE   DRUG SCREEN, URINE   ETHYL ALCOHOL   LIPID PANEL   HEMOGLOBIN A1C WITH EAG   SAMPLE TO BLOOD BANK     No results found for: DS35, PHEN, PHENO, PHENT, DILF, DS39, PHENY, PTN, VALF2, VALAC, VALP, VALPR, DS6, CRBAM, CRBAMP, CARB2, XCRBAM  Admission on 03/14/2019, Discharged on 03/19/2019   Component Date Value Ref Range Status    WBC 03/14/2019 7.0  4.1 - 11.1 K/uL Final    RBC 03/14/2019 4.90  4. 10 - 5.70 M/uL Final    HGB 03/14/2019 14.5  12.1 - 17.0 g/dL Final    HCT 03/14/2019 44.7  36.6 - 50.3 % Final    MCV 03/14/2019 91.2  80.0 - 99.0 FL Final    MCH 03/14/2019 29.6  26.0 - 34.0 PG Final    MCHC 03/14/2019 32.4  30.0 - 36.5 g/dL Final    RDW 03/14/2019 12.2  11.5 - 14.5 % Final    PLATELET 64/73/8722 689  150 - 400 K/uL Final    MPV 03/14/2019 9.8  8.9 - 12.9 FL Final    NRBC 03/14/2019 0.0  0  WBC Final    ABSOLUTE NRBC 03/14/2019 0.00  0.00 - 0.01 K/uL Final    NEUTROPHILS 03/14/2019 77* 32 - 75 % Final    LYMPHOCYTES 03/14/2019 17  12 - 49 % Final    MONOCYTES 03/14/2019 6  5 - 13 % Final    EOSINOPHILS 03/14/2019 0  0 - 7 % Final    BASOPHILS 03/14/2019 0  0 - 1 % Final    IMMATURE GRANULOCYTES 03/14/2019 0  0.0 - 0.5 % Final    ABS. NEUTROPHILS 03/14/2019 5.3  1.8 - 8.0 K/UL Final    ABS. LYMPHOCYTES 03/14/2019 1.2  0.8 - 3.5 K/UL Final    ABS. MONOCYTES 03/14/2019 0.4  0.0 - 1.0 K/UL Final    ABS. EOSINOPHILS 03/14/2019 0.0  0.0 - 0.4 K/UL Final    ABS. BASOPHILS 03/14/2019 0.0  0.0 - 0.1 K/UL Final    ABS. IMM. GRANS. 03/14/2019 0.0  0.00 - 0.04 K/UL Final    DF 03/14/2019 AUTOMATED    Final    Sodium 03/14/2019 139  136 - 145 mmol/L Final    Potassium 03/14/2019 3.9  3.5 - 5.1 mmol/L Final    Chloride 03/14/2019 105  97 - 108 mmol/L Final    CO2 03/14/2019 31  21 - 32 mmol/L Final    Anion gap 03/14/2019 3* 5 - 15 mmol/L Final    Glucose 03/14/2019 88  65 - 100 mg/dL Final    BUN 03/14/2019 10  6 - 20 MG/DL Final    Creatinine 03/14/2019 1.07  0.70 - 1.30 MG/DL Final    BUN/Creatinine ratio 03/14/2019 9* 12 - 20   Final    GFR est AA 03/14/2019 >60  >60 ml/min/1.73m2 Final    GFR est non-AA 03/14/2019 >60  >60 ml/min/1.73m2 Final    Calcium 03/14/2019 9.3  8.5 - 10.1 MG/DL Final    Bilirubin, total 03/14/2019 0.5  0.2 - 1.0 MG/DL Final    ALT (SGPT) 03/14/2019 27  12 - 78 U/L Final    AST (SGOT) 03/14/2019 15  15 - 37 U/L Final    Alk.  phosphatase 03/14/2019 130* 45 - 117 U/L Final    Protein, total 03/14/2019 7.7  6.4 - 8.2 g/dL Final    Albumin 03/14/2019 4.2  3.5 - 5.0 g/dL Final    Globulin 03/14/2019 3.5  2.0 - 4.0 g/dL Final    A-G Ratio 03/14/2019 1.2  1.1 - 2.2   Final    Salicylate level 82/49/8983 <1.7* 2.8 - 20.0 MG/DL Final    Color 03/14/2019 YELLOW/STRAW    Final    Appearance 03/14/2019 CLEAR  CLEAR   Final    Specific gravity 03/14/2019 1.010  1.003 - 1.030   Final    pH (UA) 03/14/2019 7.0  5.0 - 8.0   Final    Protein 03/14/2019 NEGATIVE   NEG mg/dL Final    Glucose 03/14/2019 NEGATIVE   NEG mg/dL Final    Ketone 03/14/2019 NEGATIVE   NEG mg/dL Final    Bilirubin 03/14/2019 NEGATIVE   NEG   Final    Blood 03/14/2019 TRACE* NEG   Final    Urobilinogen 03/14/2019 0.2  0.2 - 1.0 EU/dL Final    Nitrites 03/14/2019 NEGATIVE   NEG   Final    Leukocyte Esterase 03/14/2019 NEGATIVE   NEG   Final    WBC 03/14/2019 0-4  0 - 4 /hpf Final    RBC 03/14/2019 0-5  0 - 5 /hpf Final    Epithelial cells 03/14/2019 FEW  FEW /lpf Final    Bacteria 03/14/2019 NEGATIVE   NEG /hpf Final    Urine culture hold 03/14/2019 URINE ON HOLD IN MICROBIOLOGY DEPT FOR 3 DAYS. IF UNPRESERVED URINE IS SUBMITTED, IT CANNOT BE USED FOR ADDITIONAL TESTING AFTER 24 HRS, RECOLLECTION WILL BE REQUIRED. Final    AMPHETAMINES 03/14/2019 NEGATIVE   NEG   Final    BARBITURATES 03/14/2019 NEGATIVE   NEG   Final    BENZODIAZEPINES 03/14/2019 NEGATIVE   NEG   Final    COCAINE 03/14/2019 NEGATIVE   NEG   Final    METHADONE 03/14/2019 NEGATIVE   NEG   Final    OPIATES 03/14/2019 NEGATIVE   NEG   Final    PCP(PHENCYCLIDINE) 03/14/2019 NEGATIVE   NEG   Final    THC (TH-CANNABINOL) 03/14/2019 NEGATIVE   NEG   Final    Drug screen comment 03/14/2019 (NOTE)   Final    ALCOHOL(ETHYL),SERUM 03/14/2019 <10  <10 MG/DL Final    Acetaminophen level 03/14/2019 <2* 10 - 30 ug/mL Final    LIPID PROFILE 03/19/2019        Final    Cholesterol, total 03/19/2019 117  <200 MG/DL Final    Triglyceride 03/19/2019 68  <150 MG/DL Final    HDL Cholesterol 03/19/2019 69  MG/DL Final    LDL, calculated 03/19/2019 34.4  0 - 100 MG/DL Final    VLDL, calculated 03/19/2019 13.6  MG/DL Final    CHOL/HDL Ratio 03/19/2019 1.7  0.0 - 5.0   Final    Hemoglobin A1c 03/19/2019 5.4  4.2 - 6.3 % Final    Est. average glucose 03/19/2019 108  mg/dL Final     No results found. DISPOSITION:    Home. Patient to f/u with psychiatric and psychotherapy appointments.               FOLLOW-UP CARE:    Activity as tolerated  Regular Diet  Wound Care: none needed. Follow-up Information     Follow up With Specialties Details Why Contact Info    [address]  On 3/19/2019 you can walk in for same day access Monday - Wednesday 7:30 to 3:00 Thursday 7:30 to 5:30 and Friday 7:30 to 12:00 Please allow for a 2 hour assessment [place] location [address] [place] [facility] [place] [address]  [phone]    None    None (251) Patient stated that they have no PCP                   PROGNOSIS:   Good ---- based on nature of patient's pathology and treatment compliance issues. Prognosis is greatly dependent upon patient's ability to follow up with psychiatric/psychotherapy appointments as well as to comply with psychiatric medications as prescribed. DISCHARGE MEDICATIONS:   Informed consent given for the use of following psychotropic medications:  Discharge Medication List as of 3/19/2019 12:29 PM      START taking these medications    Details   ARIPiprazole (ABILIFY) 15 mg tablet Take 1 Tab by mouth daily. , Print, Disp-30 Tab, R-0      busPIRone (BUSPAR) 5 mg tablet Take 1 Tab by mouth three (3) times daily. , Print, Disp-90 Tab, R-0      hydrOXYzine HCl (ATARAX) 50 mg tablet Take 1 Tab by mouth every six (6) hours as needed (anxiety) for up to 10 days. , Print, Disp-30 Tab, R-0      sertraline (ZOLOFT) 50 mg tablet Take 1 Tab by mouth daily. , Print, Disp-30 Tab, R-0      traZODone (DESYREL) 50 mg tablet Take 1 Tab by mouth nightly as needed for Sleep ((1st line)). , Print, Disp-30 Tab, R-0                    A coordinated, multidisplinary treatment team round was conducted with Estephania Jiménez is done daily here at [facility]. This team consists of the nurse, psychiatric unit pharmcist,  and writer. I have spent greater than 35 minutes on discharge work.     Signed:  Donya Raza NP  3/23/2019

## 2019-04-09 ENCOUNTER — OFFICE VISIT (OUTPATIENT)
Dept: FAMILY MEDICINE CLINIC | Age: 24
End: 2019-04-09

## 2019-04-09 VITALS
RESPIRATION RATE: 18 BRPM | OXYGEN SATURATION: 99 % | BODY MASS INDEX: 19.27 KG/M2 | HEART RATE: 86 BPM | TEMPERATURE: 97.4 F | WEIGHT: 155 LBS | SYSTOLIC BLOOD PRESSURE: 155 MMHG | HEIGHT: 75 IN | DIASTOLIC BLOOD PRESSURE: 68 MMHG

## 2019-04-09 DIAGNOSIS — F20.3 UNDIFFERENTIATED SCHIZOPHRENIA (HCC): ICD-10-CM

## 2019-04-09 DIAGNOSIS — R68.84 JAW PAIN: ICD-10-CM

## 2019-04-09 DIAGNOSIS — R31.9 HEMATURIA, UNSPECIFIED TYPE: Primary | ICD-10-CM

## 2019-04-09 DIAGNOSIS — R10.31 RIGHT GROIN PAIN: ICD-10-CM

## 2019-04-09 DIAGNOSIS — F84.0 AUTISM: ICD-10-CM

## 2019-04-09 LAB
BILIRUB UR QL STRIP: NEGATIVE
GLUCOSE UR-MCNC: NEGATIVE MG/DL
KETONES P FAST UR STRIP-MCNC: NEGATIVE MG/DL
PH UR STRIP: 7 [PH] (ref 4.6–8)
PROT UR QL STRIP: NEGATIVE
SP GR UR STRIP: 1.02 (ref 1–1.03)
UA UROBILINOGEN AMB POC: NORMAL (ref 0.2–1)
URINALYSIS CLARITY POC: CLEAR
URINALYSIS COLOR POC: YELLOW
URINE BLOOD POC: NORMAL
URINE LEUKOCYTES POC: NEGATIVE
URINE NITRITES POC: NEGATIVE

## 2019-04-09 RX ORDER — TRAZODONE HYDROCHLORIDE 50 MG/1
TABLET ORAL
Refills: 0 | COMMUNITY
Start: 2019-03-19 | End: 2019-04-09

## 2019-04-09 RX ORDER — QUETIAPINE FUMARATE 50 MG/1
50 TABLET, FILM COATED ORAL
COMMUNITY
Start: 2019-04-09 | End: 2019-04-09 | Stop reason: SDUPTHER

## 2019-04-09 RX ORDER — ARIPIPRAZOLE 15 MG/1
TABLET ORAL
Refills: 0 | COMMUNITY
Start: 2019-03-19 | End: 2019-05-07 | Stop reason: SDUPTHER

## 2019-04-09 RX ORDER — HYDROXYZINE 50 MG/1
TABLET, FILM COATED ORAL
Refills: 0 | COMMUNITY
Start: 2019-03-19 | End: 2021-01-18

## 2019-04-09 RX ORDER — SERTRALINE HYDROCHLORIDE 50 MG/1
TABLET, FILM COATED ORAL
Refills: 0 | COMMUNITY
Start: 2019-03-19 | End: 2019-05-07 | Stop reason: SDUPTHER

## 2019-04-09 RX ORDER — QUETIAPINE FUMARATE 50 MG/1
50 TABLET, FILM COATED ORAL
Qty: 30 TAB | Refills: 3 | Status: SHIPPED | OUTPATIENT
Start: 2019-04-09 | End: 2019-09-05 | Stop reason: DRUGHIGH

## 2019-04-09 RX ORDER — BUSPIRONE HYDROCHLORIDE 5 MG/1
TABLET ORAL
Refills: 0 | COMMUNITY
Start: 2019-03-19 | End: 2019-05-07 | Stop reason: SDUPTHER

## 2019-04-09 RX ORDER — QUETIAPINE FUMARATE 50 MG/1
50 TABLET, FILM COATED ORAL 2 TIMES DAILY
COMMUNITY
End: 2019-04-09

## 2019-04-09 RX ORDER — BENZTROPINE MESYLATE 1 MG/1
1 TABLET ORAL 2 TIMES DAILY
Qty: 60 TAB | Refills: 6 | Status: SHIPPED | OUTPATIENT
Start: 2019-04-09 | End: 2019-09-05 | Stop reason: SDUPTHER

## 2019-04-09 NOTE — PATIENT INSTRUCTIONS
[facility] in [address] [facility] [address] [facility], [address]  Mondays 6:00-8:00pm  [address] [place] [address] 64, [address]  1st and 3rd Fridays, 6:30-8:00pm    [address]  By appointment:  [email or web address]  [email or web address]    [address] [facility]  [address] [place], [address]  Wed nights 7:00-8:30pm            Blood in the Urine: Care Instructions  Your Care Instructions    Blood in the urine, or hematuria, may make the urine look red, brown, or pink. There may be blood every time you urinate or just from time to time. You cannot always see blood in the urine, but it will show up in a urine test.  Blood in the urine may be serious. It should always be checked by a doctor. Your doctor may recommend more tests, including an X-ray, a CT scan, or a cystoscopy (which lets a doctor look inside the urethra and bladder). Blood in the urine can be a sign of another problem. Common causes are bladder infections and kidney stones. An injury to your groin or your genital area can also cause bleeding in the urinary tract. Very hard exercise--such as running a marathon--can cause blood in the urine. Blood in the urine can also be a sign of kidney disease or cancer in the bladder or kidney. Many cases of blood in the urine are caused by a harmless condition that runs in families. This is called benign familial hematuria. It does not need any treatment. Sometimes your urine may look red or brown even though it does not contain blood. For example, not getting enough fluids (dehydration), taking certain medicines, or having a liver problem can change the color of your urine.  Eating foods such as beets, rhubarb, or blackberries or foods with red food coloring can make your urine look red or pink.  Follow-up care is a key part of your treatment and safety. Be sure to make and go to all appointments, and call your doctor if you are having problems. It's also a good idea to know your test results and keep a list of the medicines you take. When should you call for help? Call your doctor now or seek immediate medical care if:    · You have symptoms of a urinary infection. For example:  ? You have pus in your urine. ? You have pain in your back just below your rib cage. This is called flank pain. ? You have a fever, chills, or body aches. ? It hurts to urinate. ? You have groin or belly pain.     · You have more blood in your urine.    Watch closely for changes in your health, and be sure to contact your doctor if:    · You have new urination problems.     · You do not get better as expected. Where can you learn more? Go to http://bal-mihir.info/. Enter A352 in the search box to learn more about \"Blood in the Urine: Care Instructions. \"  Current as of: March 20, 2018  Content Version: 11.9  © 6484-3652 [employer]. Care instructions adapted under license by [employer] (which disclaims liability or warranty for this information). If you have questions about a medical condition or this instruction, always ask your healthcare professional. [address] any warranty or liability for your use of this information.

## 2019-04-09 NOTE — PROGRESS NOTES
Chief Complaint   Patient presents with    New Patient     establish care new from out of state     Jaw Pain     for cata. 2 months     Arm Pain     patient reports spasms primary in right arm with pain through entire arm     Urinary Burning     for cata. month     Blood in Urine     cata. 1 week ago patient noticed small amount of bright red blood, only noticed once      1. Have you been to the ER, urgent care clinic since your last visit? Hospitalized since your last visit? [facility] 3/14     2. Have you seen or consulted any other health care providers outside of the [address] [place] [address] since your last visit? Include any pap smears or colon screening.  No

## 2019-04-09 NOTE — PROGRESS NOTES
[facility]  [address] [place], [address] [place] [address] Road  [phone]             Date of visit: 4/9/19   Subjective:      History obtained from:  Patient, mom. Gema Albarran is a 21 y.o. male who presents today for new patient  Recently moved back to [place] from [facility] last May    Was dx schizophrenia 2014  Even now daily hears a thousand voices telling him to kill himself  Sometimes sees something like a person or ghost and feels it touch him at times  Never had that as a boy  Was dx autism age 8  Will go Thursday to psychiatrist, Danitza [facility]  Does take his meds daily  From June until March he wasn't on medication  In March was in psych hospital and most meds resumed. No longer on cogentin for muscle spasms  Also did not resume the seroquel he used to take at night to sleep  They put him on trazodone instead. Seems to upset stomach, gets up and has BM and can't get back to sleep  They had 1/2 bottle seroquel so mom gve him 50mg at night instead of trazodone. That seems to be working better    When he was 17 he had his first huge episode with depression, was suicidal  Mom took him to pediatrician, took him to [facility], checked in him there, stayed for weekend  Psychiatrist there said he felt it was a spiritual issue and referred them to speak to   So they went to their  and got a little better for 6 months but then came back worse    With the autism mom says pt has always been socially awkward, not many friends, picked on at school, etc  Issues with school were so severe, mom says counselors at school were not specifically trained to help someone with autism  So she pulled him out in 8th grade and started homeschool, that is when IEP stopped.   Years of emotional ups and downs  Having pleasant dreams now, but used to be nightmares  He says he does get angry at home and throws stuff at times  The regimen has helped some but not enough    Does have some medical concerns:    His jaw locks and pops. Some pain in lower jaw, now near tmjs  Sees dentist regularly, wears retainers, teeth in good shape    Hurting/burning when he urinates or has BM, right above his scrotum. Feels a little roughness right lower abdomen/groin. Denies testicular pain  Does have history bilateral inguinal hernia repair as a baby  Saw a little blood in urine just once recently  Not having pain or discharge there  Never sexually active    Patient Active Problem List    Diagnosis Date Noted    Autism 04/10/2019    Undifferentiated schizophrenia ([facility] [address] 04/09/2019    Jaw pain 04/09/2019    Hematuria 04/09/2019    Right groin pain 04/09/2019     Current Outpatient Medications   Medication Sig Dispense Refill    ARIPiprazole (ABILIFY) 15 mg tablet TK 1 T PO D  0    busPIRone (BUSPAR) 5 mg tablet TK 1 T PO TID  0    hydrOXYzine HCl (ATARAX) 50 mg tablet TK 1 T PO Q 6 H PRN FOR UP TO 10 DAYS  0    sertraline (ZOLOFT) 50 mg tablet TK 1 T PO QD  0    benztropine (COGENTIN) 1 mg tablet Take 1 Tab by mouth two (2) times a day. 60 Tab 6    QUEtiapine (SEROQUEL) 50 mg tablet Take 1 Tab by mouth nightly.  30 Tab 3     Allergies   Allergen Reactions    Aspirin Hives     Past Medical History:   Diagnosis Date    Scoliosis      Past Surgical History:   Procedure Laterality Date    HX CIRCUMCISION  1995    HX HERNIA REPAIR Bilateral 09/1995     Family History   Problem Relation Age of Onset    No Known Problems Mother     No Known Problems Father      Social History     Tobacco Use    Smoking status: Never Smoker    Smokeless tobacco: Never Used   Substance Use Topics    Alcohol use: Never     Frequency: Never      Social History     Social History Narrative    Lives with mom        Review of Systems (a little limited by his disorganization)  Gen: denies fever  Psych: denies SI, just hears the voices  GI denies bowel changes   denies pain with urination  Skin: denies rashes  Endo: denies recent weight change  Neuro: denies seizures  ENT denies cold symptoms  Card: denies chest pain  Pulm: denies shortness of breath          Objective:     Vitals:    04/09/19 1440   BP: 155/68   Pulse: 86   Resp: 18   Temp: 97.4 °F (36.3 °C)   TempSrc: Oral   SpO2: 99%   Weight: 155 lb (70.3 kg)   Height: 6' 3\" (1.905 m)     Body mass index is 19.37 kg/m². General: stated age, well-developed, thin and in NAD  Eyes: PERRL, EOMI, no redness or drainage  Nose: no drainage  Mouth: no lesions, excellent dentitia with upper/lower retainers  Throat: no erythema, exudate or swelling  Neck: supple, symmetrical, trachea midline, no adenopathy and thyroid: not enlarged, symmetric, no tenderness/mass/nodules  Lungs:  clear to auscultation w/o rales, rhonchi, wheezes w/normal effort and no use of accessory muscles of respiration   Heart: regular rate and rhythm, S1, S2 normal, no murmur, click, rub or gallop  Abdomen: soft, nontender, no masses, nothing observed in groin where he feels the pain or lump   normal male, no hernia  Ext:  No edema noted. Lymph: no cervical adenopathy appreciated  Skin:  Normal. and no rash or abnormalities   Neuro: normal gait, CN 2-12 intact  MSK: no tenderness or popping of TMJs on my exam, no tenderness of lower jaw  Psych: alert, pacing the room often, neutral affect, talking mostly about the voices and visual hallucinations and how they are disturbing/scary, does respond appropriately to most questions, a bit tangential and disorganized though    Assessment/Plan:       ICD-10-CM ICD-9-CM    1. Hematuria, unspecified type R31.9 599.70 AMB POC URINALYSIS DIP STICK AUTO W/O MICRO      CULTURE, URINE      URINALYSIS W/ RFLX MICROSCOPIC   2. Undifferentiated schizophrenia ([facility] [address] F20.3 295.90    3. Right groin pain R10.31 789.09    4. Jaw pain R68.84 784.92    5.  Autism F84.0 299.00         Orders Placed This Encounter    CULTURE, URINE    URINALYSIS W/ RFLX MICROSCOPIC    AMB POC URINALYSIS DIP STICK AUTO W/O MICRO    ARIPiprazole (ABILIFY) 15 mg tablet    busPIRone (BUSPAR) 5 mg tablet    hydrOXYzine HCl (ATARAX) 50 mg tablet    sertraline (ZOLOFT) 50 mg tablet    DISCONTD: traZODone (DESYREL) 50 mg tablet    DISCONTD: QUEtiapine (SEROQUEL) 50 mg tablet    benztropine (COGENTIN) 1 mg tablet    DISCONTD: QUEtiapine (SEROQUEL) 50 mg tablet    QUEtiapine (SEROQUEL) 50 mg tablet       Not sure if he really had gross hematuria or if it were a hallucination of sorts  Will do urine tests as above, the POC did show trace blood but want to confirm  Will consider referral urology  Normal exam    Not sure of cause of jaw pain, suspect muscle tightness related to his meds  Will put back on cogentin as he used to take, see if that helps    Main problem is schizophrenia, certainly not perfectly controlled, has daily visual/auditory scary hallucinations  Mom very supportive, informed  Has appt with psych soon  Advised going back on seroquel, he slept better on that. Just a low dose  Ok to stop the trazodone  No other med changes today  Just had labs in hospital last month so those aren't needed yet    Discussed the diagnosis and plan and he expressed understanding. Follow-up and Dispositions    · Return in about 1 month (around 5/7/2019) for Follow up.          Oswaldo Jim MD

## 2019-04-10 PROBLEM — F84.0 AUTISM: Status: ACTIVE | Noted: 2019-04-10

## 2019-04-11 LAB
APPEARANCE UR: CLEAR
BACTERIA UR CULT: NO GROWTH
BILIRUB UR QL STRIP: NEGATIVE
COLOR UR: YELLOW
GLUCOSE UR QL: NEGATIVE
HGB UR QL STRIP: NEGATIVE
KETONES UR QL STRIP: NEGATIVE
LEUKOCYTE ESTERASE UR QL STRIP: NEGATIVE
MICRO URNS: NORMAL
NITRITE UR QL STRIP: NEGATIVE
PH UR STRIP: 7.5 [PH] (ref 5–7.5)
PROT UR QL STRIP: NEGATIVE
SP GR UR: 1.02 (ref 1–1.03)
UROBILINOGEN UR STRIP-MCNC: 0.2 MG/DL (ref 0.2–1)

## 2019-04-11 NOTE — PROGRESS NOTES
Sent in letter:  Urine sent to the lab had no blood at all, and no infection. No further testing is needed--this is great news!

## 2019-05-07 ENCOUNTER — OFFICE VISIT (OUTPATIENT)
Dept: FAMILY MEDICINE CLINIC | Age: 24
End: 2019-05-07

## 2019-05-07 VITALS
TEMPERATURE: 97.6 F | RESPIRATION RATE: 18 BRPM | WEIGHT: 157 LBS | HEART RATE: 89 BPM | HEIGHT: 75 IN | BODY MASS INDEX: 19.52 KG/M2 | SYSTOLIC BLOOD PRESSURE: 115 MMHG | DIASTOLIC BLOOD PRESSURE: 58 MMHG | OXYGEN SATURATION: 97 %

## 2019-05-07 DIAGNOSIS — F20.3 UNDIFFERENTIATED SCHIZOPHRENIA (HCC): ICD-10-CM

## 2019-05-07 DIAGNOSIS — R68.84 JAW PAIN: Primary | ICD-10-CM

## 2019-05-07 DIAGNOSIS — F84.0 AUTISM: ICD-10-CM

## 2019-05-07 PROBLEM — R31.9 HEMATURIA: Status: RESOLVED | Noted: 2019-04-09 | Resolved: 2019-05-07

## 2019-05-07 PROBLEM — F20.9 SCHIZOPHRENIA (HCC): Status: RESOLVED | Noted: 2019-03-14 | Resolved: 2019-05-07

## 2019-05-07 RX ORDER — QUETIAPINE FUMARATE 50 MG/1
50 TABLET, EXTENDED RELEASE ORAL DAILY
COMMUNITY
End: 2019-09-05 | Stop reason: DRUGHIGH

## 2019-05-07 NOTE — PATIENT INSTRUCTIONS
Head or Face Pain: Care Instructions Your Care Instructions Common causes of head or face pain are allergies, stress, and injuries. Other causes include tooth problems and sinus infections. Eating certain foods, such as chocolate or cheese, or drinking certain liquids, such as coffee or cola, can cause head pain for some people. If you have mild head pain, you may not need treatment. It is important to watch your symptoms and talk to your doctor if your pain continues or gets worse. Follow-up care is a key part of your treatment and safety. Be sure to make and go to all appointments, and call your doctor if you are having problems. It's also a good idea to know your test results and keep a list of the medicines you take. How can you care for yourself at home? · Take pain medicines exactly as directed. ? If the doctor gave you a prescription medicine for pain, take it as prescribed. ? If you are not taking a prescription pain medicine, ask your doctor if you can take an over-the-counter pain medicine. · Take it easy for the next few days or longer if you are not feeling well. · Use a warm, moist towel or heating pad set on low to relax tight muscles in your shoulder and neck. Have someone gently massage your neck and shoulders. · Put ice or a cold pack on the area for 10 to 20 minutes at a time. Put a thin cloth between the ice and your skin. When should you call for help? Call 911 anytime you think you may need emergency care. For example, call if: 
  · You have twitching, jerking, or a seizure.  
  · You passed out (lost consciousness).  
  · You have symptoms of a stroke. These may include: 
? Sudden numbness, tingling, weakness, or loss of movement in your face, arm, or leg, especially on only one side of your body. ? Sudden vision changes. ? Sudden trouble speaking. ? Sudden confusion or trouble understanding simple statements. ? Sudden problems with walking or balance. ? A sudden, severe headache that is different from past headaches.  
  · You have jaw pain and pain in your chest, shoulder, neck, or arm.  
 Call your doctor now or seek immediate medical care if: 
  · You have a fever with a stiff neck or a severe headache.  
  · You have nausea and vomiting, or you cannot keep food or liquids down.  
 Watch closely for changes in your health, and be sure to contact your doctor if: 
  · Your head or face pain does not get better as expected. Where can you learn more? Go to http://bal-mihir.info/. Enter P568 in the search box to learn more about \"Head or Face Pain: Care Instructions. \" Current as of: September 23, 2018 Content Version: 11.9 © 8868-9806 [employer]. Care instructions adapted under license by [employer] (which disclaims liability or warranty for this information). If you have questions about a medical condition or this instruction, always ask your healthcare professional. [place] [address] any warranty or liability for your use of this information.

## 2019-05-07 NOTE — LETTER
5/7/2019 11:33 AM 
 
Mr. Collin Moss [address] [place] [address] Current Outpatient Medications:  
  QUEtiapine SR (SEROQUEL XR) 50 mg sr tablet, Take 50 mg by mouth daily. , Disp: , Rfl:  
  hydrOXYzine HCl (ATARAX) 50 mg tablet, TK 1 T PO Q 6 H PRN FOR UP TO 10 DAYS, Disp: , Rfl: 0 
  benztropine (COGENTIN) 1 mg tablet, Take 1 Tab by mouth two (2) times a day., Disp: 60 Tab, Rfl: 6   ARIPiprazole (ABILIFY) 15 mg tablet, Take 1 Tab by mouth daily. , Disp: 30 Tab, Rfl: 0 
  busPIRone (BUSPAR) 5 mg tablet, Take 1 Tab by mouth three (3) times daily. , Disp: 90 Tab, Rfl: 0 
  sertraline (ZOLOFT) 50 mg tablet, Take 1 Tab by mouth daily. , Disp: 30 Tab, Rfl: 0 
  QUEtiapine (SEROQUEL) 50 mg tablet, Take 1 Tab by mouth nightly., Disp: 30 Tab, Rfl: 3 Sincerely, Ayo Vo MD

## 2019-05-07 NOTE — PROGRESS NOTES
Chief Complaint Patient presents with  Form Completion  
  patient is enrolling in group home and needs form clpleted as well as TB test   
 
 
1. Have you been to the ER, urgent care clinic since your last visit? Hospitalized since your last visit? No 
 
2. Have you seen or consulted any other health care providers outside of the [address] [place] [address] since your last visit? Include any pap smears or colon screening. Patient had psych celine on April 11th with Sturgis Cibiem

## 2019-05-07 NOTE — PROGRESS NOTES
[address] [facility] [address] [place], [address] 
[phone] 
 
/         Date of visit: /  
Subjective:  
  
History obtained from:  Patient, mother. Tiffany Ureña is a 21 y.o. male who presents today for follow up chronic Still has popping and locking jaw at times Having some spasm in right posterior arm at times Feels a vein there Going into a group home nad needs paper filled out, see scanned Needs tb test, they prefer to do the blood test. 
 
Stomach feeling ok Still has some scary voices, those have not stopped Sleep doing well on seroquel Did see new psychiatrist and that went well Patient Active Problem List  
 Diagnosis Date Noted  Autism 04/10/2019  Undifferentiated schizophrenia ([facility] [address] 04/09/2019  Jaw pain 04/09/2019  Right groin pain 04/09/2019 Current Outpatient Medications Medication Sig Dispense Refill  QUEtiapine SR (SEROQUEL XR) 50 mg sr tablet Take 50 mg by mouth daily.  hydrOXYzine HCl (ATARAX) 50 mg tablet TK 1 T PO Q 6 H PRN FOR UP TO 10 DAYS  0  
 benztropine (COGENTIN) 1 mg tablet Take 1 Tab by mouth two (2) times a day. 60 Tab 6  ARIPiprazole (ABILIFY) 15 mg tablet Take 1 Tab by mouth daily. 30 Tab 0  
 busPIRone (BUSPAR) 5 mg tablet Take 1 Tab by mouth three (3) times daily. 90 Tab 0  
 sertraline (ZOLOFT) 50 mg tablet Take 1 Tab by mouth daily. 30 Tab 0  
 QUEtiapine (SEROQUEL) 50 mg tablet Take 1 Tab by mouth nightly. 30 Tab 3 Allergies Allergen Reactions  Aspirin Hives Past Medical History:  
Diagnosis Date  Asperger syndrome  Schizophrenia ([facility] [address]  Scoliosis Past Surgical History:  
Procedure Laterality Date [address] [facility]  HX HERNIA REPAIR    
 as an infant  HX HERNIA REPAIR Bilateral 09/1995 Family History Problem Relation Age of Onset  No Known Problems Mother  No Known Problems Father Social History Tobacco Use  
  Smoking status: Never Smoker  Smokeless tobacco: Never Used Substance Use Topics  Alcohol use: Never Frequency: Never Social History Social History Narrative Lives with mom Might be moving to group home spring 2019 Review of Systems GI denies constipation GEn: denies fever Objective:  
 
Vitals:  
 05/07/19 1055 BP: 115/58 Pulse: 89 Resp: 18 Temp: 97.6 °F (36.4 °C) TempSrc: Oral  
SpO2: 97% Weight: 157 lb (71.2 kg) Height: 6' 3\" (1.905 m) Body mass index is 19.62 kg/m². General: stated age, well-developed, thin, and in NAD Head: no tenderness of jaw or TMJs Eyes: PERRL, EOMI, no redness or drainage Nose: no drainage Mouth: no lesions Throat: no erythema, exudate or swelling Neck: supple, symmetrical, trachea midline, no adenopathy and thyroid: not enlarged, symmetric, no tenderness/mass/nodules Lungs:  clear to auscultation w/o rales, rhonchi, wheezes w/normal effort and no use of accessory muscles of respiration Heart: regular rate and rhythm, S1, S2 normal, no murmur, click, rub or gallop Abdomen: soft, nontender, no masses Ext:  No edema noted. Lymph: no cervical adenopathy appreciated Skin:  Normal. and no rash or abnormalities Neuro: normal gait, CN 2-12 intact Psych: alert and oriented to person, place, time and situation and Speech: appropriate quality, quantity and organization of sentences. Much more appropriate today, responds normally to questions, doesn't seem to be perserverating about voices like he was last time. Assessment/Plan: ICD-10-CM ICD-9-CM 1. Jaw pain R68.84 784.92   
2. Undifferentiated schizophrenia ([address] F20.3 295.90   
3. Autism F84.0 299.00 Orders Placed This Encounter  QUEtiapine SR (SEROQUEL XR) 50 mg sr tablet Normal exam 
Overall doing better it seems I think group home will be great for him, see scanned forms Will need labs in 4 mo for his meds so will see him then Chronic problems stable Discussed the diagnosis and plan and he expressed understanding. Follow-up and Dispositions · Return in about 4 months (around 9/7/2019) for Follow up.  
  
 
 
Arnold Little MD

## 2019-09-05 ENCOUNTER — OFFICE VISIT (OUTPATIENT)
Dept: FAMILY MEDICINE CLINIC | Age: 24
End: 2019-09-05

## 2019-09-05 VITALS
HEART RATE: 84 BPM | WEIGHT: 171.4 LBS | HEIGHT: 75 IN | RESPIRATION RATE: 16 BRPM | TEMPERATURE: 97.6 F | DIASTOLIC BLOOD PRESSURE: 62 MMHG | BODY MASS INDEX: 21.31 KG/M2 | OXYGEN SATURATION: 99 % | SYSTOLIC BLOOD PRESSURE: 128 MMHG

## 2019-09-05 DIAGNOSIS — Z23 ENCOUNTER FOR IMMUNIZATION: ICD-10-CM

## 2019-09-05 DIAGNOSIS — Z51.81 MEDICATION MONITORING ENCOUNTER: ICD-10-CM

## 2019-09-05 DIAGNOSIS — R68.84 JAW PAIN: Primary | ICD-10-CM

## 2019-09-05 DIAGNOSIS — F84.0 AUTISM: ICD-10-CM

## 2019-09-05 DIAGNOSIS — F20.3 UNDIFFERENTIATED SCHIZOPHRENIA (HCC): ICD-10-CM

## 2019-09-05 DIAGNOSIS — R25.2 MUSCLE CRAMPING: ICD-10-CM

## 2019-09-05 RX ORDER — QUETIAPINE FUMARATE 50 MG/1
150 TABLET, EXTENDED RELEASE ORAL
COMMUNITY
Start: 2019-09-05 | End: 2020-03-05

## 2019-09-05 RX ORDER — ARIPIPRAZOLE 20 MG/1
20 TABLET ORAL
Status: ON HOLD | COMMUNITY
Start: 2019-09-05 | End: 2021-01-26 | Stop reason: SDUPTHER

## 2019-09-05 RX ORDER — BENZTROPINE MESYLATE 1 MG/1
TABLET ORAL
Qty: 90 TAB | Refills: 6 | Status: SHIPPED | OUTPATIENT
Start: 2019-09-05 | End: 2019-10-15 | Stop reason: SDUPTHER

## 2019-09-05 RX ORDER — BUSPIRONE HYDROCHLORIDE 5 MG/1
TABLET ORAL
Qty: 90 TAB | Refills: 0 | COMMUNITY
Start: 2019-09-05 | End: 2021-01-18

## 2019-09-05 NOTE — PROGRESS NOTES
[facility]  [address] [facility] [address] [place], [address]  [phone]             Date of visit: 9/5/2019   Subjective:      History obtained from:  Patient, mother. Aileen Cooks is a 25 y.o. male who presents today for jaw pain continues  On and off  Not really better or worse    Has cramps in both arms every day  Hurts pretty bad  Lasts a short time but really hurts when it happens    Drinking plenty of water about 6 cups daily  Coffee quite a bit  Not usually soda    Still hearing the voices  Still bothersome  Sometimes tell him to hurt himself  [facility]  They have raised his abilify and seroquel    Group home situation with some problems, some drama, has not helped his anger issues    Is getting some exercise, they may go to gym together a few days per week    Plans to get flu shot later this fall    Patient Active Problem List    Diagnosis Date Noted    Autism 04/10/2019    Undifferentiated schizophrenia ([facility] [address] 04/09/2019    Jaw pain 04/09/2019    Right groin pain 04/09/2019     Current Outpatient Medications   Medication Sig Dispense Refill    ARIPiprazole (ABILIFY) 20 mg tablet Take 1 Tab by mouth daily.  QUEtiapine SR (SEROQUEL XR) 50 mg sr tablet Take 3 Tabs by mouth nightly.  busPIRone (BUSPAR) 5 mg tablet 15mg in AM and 10mg in PM  Indications: Repeated Episodes of Anxiety 90 Tab 0    benztropine (COGENTIN) 1 mg tablet 2mg PO in AM, 1mg PO in PM 90 Tab 6    loratadine (CLARITIN) 10 mg tablet Take 1 Tab by mouth daily. 30 Tab 2    hydrOXYzine HCl (ATARAX) 50 mg tablet TK 1 T PO Q 6 H PRN FOR UP TO 10 DAYS  0    sertraline (ZOLOFT) 50 mg tablet Take 1 Tab by mouth daily.  30 Tab 0     Allergies   Allergen Reactions    Aspirin Hives     Past Medical History:   Diagnosis Date    Asperger syndrome     Schizophrenia ([facility] [address]     Scoliosis      Past Surgical History:   Procedure Laterality Date    HX CIRCUMCISION  1995    HX HERNIA REPAIR as an infant     HX HERNIA REPAIR Bilateral 09/1995     Family History   Problem Relation Age of Onset    No Known Problems Mother     No Known Problems Father      Social History     Tobacco Use    Smoking status: Never Smoker    Smokeless tobacco: Never Used   Substance Use Topics    Alcohol use: Never     Frequency: Never      Social History     Social History Narrative    Lives with mom    Might be moving to group home spring 2019        Review of Systems  Gen: denies fever  CV denies swelling     Objective:     Vitals:    09/05/19 0956   BP: 128/62   Pulse: 84   Resp: 16   Temp: 97.6 °F (36.4 °C)   TempSrc: Oral   SpO2: 99%   Weight: 171 lb 6.4 oz (77.7 kg)   Height: 6' 3\" (1.905 m)     Body mass index is 21.42 kg/m². General: stated age, well developed, well nourished and in NAD  Neck: supple, symmetrical, trachea midline, no adenopathy and thyroid: not enlarged, symmetric, no tenderness/mass/nodules  Lungs:  clear to auscultation w/o rales, rhonchi, wheezes w/normal effort and no use of accessory muscles of respiration   Heart: regular rate and rhythm, S1, S2 normal, no murmur, click, rub or gallop  Ext:  No edema noted. Lymph: no cervical adenopathy appreciated  Head: no tenderness of TMJs  MSK: no tenderness of arm muscles where he has the cramps  Skin:  Normal. and no rash or abnormalities   Psych: alert and oriented to person, place, time and situation and Speech: appropriate quality, quantity and organization of sentences      Assessment/Plan:       ICD-10-CM ICD-9-CM    1. Jaw pain R68.84 784.92    2. Undifferentiated [facility] ([facility] F20.3 295.90 CBC WITH AUTOMATED DIFF      METABOLIC PANEL, COMPREHENSIVE      LIPID PANEL      TSH 3RD GENERATION   3. Autism F84.0 299.00    4. Medication monitoring encounter Z51.81 V58.83 CBC WITH AUTOMATED DIFF      METABOLIC PANEL, COMPREHENSIVE      LIPID PANEL      TSH 3RD GENERATION   5.  Muscle cramping R25.2 729.82 MAGNESIUM   6. Encounter for immunization Z23 V03.89 INFLUENZA VIRUS VAC QUAD,SPLIT,PRESV FREE SYRINGE IM      TETANUS AND DIPHTHERIA TOXOIDS (TD) ADSORBED, PRES. FREE, IN INDIVIDS. >=7, IM        Orders Placed This Encounter    Influenza virus vaccine (QUADRIVALENT PRES FREE SYRINGE) IM ([id number])    Tetanus and diphtheria toxoid (TD) adsorbed, pres. free,, in individuals >=7 years, IM    CBC WITH AUTOMATED DIFF    METABOLIC PANEL, COMPREHENSIVE    LIPID PANEL    TSH 3RD GENERATION    MAGNESIUM    ARIPiprazole (ABILIFY) 20 mg tablet    QUEtiapine SR (SEROQUEL XR) 50 mg sr tablet    busPIRone (BUSPAR) 5 mg tablet    benztropine (COGENTIN) 1 mg tablet       Jaw pain intermittent but not hurting now  Does have muscle tension and spasms, may be cause of the pain  Will up his cogentin dose a bit more  Also do labs  Mood improved but still having disturbing voices  Followed closely by psych    Discussed the diagnosis and plan and he expressed understanding. Follow-up and Dispositions    · Return in about 6 months (around 3/5/2020).          Irvin Monique MD

## 2019-09-05 NOTE — PATIENT INSTRUCTIONS
Muscle Cramps: Care Instructions  Your Care Instructions    A muscle cramp occurs when a muscle tightens up suddenly. A cramp often happens in the legs. A muscle cramp is also called a muscle spasm or a charley horse. Muscle cramps usually last less than a minute. However, the pain may last for several minutes. Leg cramps that occur at night may wake you up. Heavy exercise, dehydration, and being overweight can increase your risk of getting cramps. An imbalance of certain chemicals in your blood, called electrolytes, can also lead to muscle cramps. Pregnant women sometimes get muscle cramps during sleep. Muscle cramps can be treated by stretching and massaging the muscle. If cramps keep coming back, your doctor may prescribe medicine that relaxes your muscles. Follow-up care is a key part of your treatment and safety. Be sure to make and go to all appointments, and call your doctor if you are having problems. It's also a good idea to know your test results and keep a list of the medicines you take. How can you care for yourself at home? · Drink plenty of fluids to prevent dehydration. Choose water and other caffeine-free clear liquids until you feel better. If you have kidney, heart, or liver disease and have to limit fluids, talk with your doctor before you increase the amount of fluids you drink. · Stretch your muscles every day, especially before and after exercise and at bedtime. Regular stretching can relax your muscles and may prevent cramps. · Do not suddenly increase the amount of exercise you get. Increase your exercise a little each week. · When you get a cramp, stretch and massage the muscle. You can also take a warm shower or bath to relax the muscle. A heating pad placed on the muscle can also help. · Take a daily multivitamin supplement. · Ask your doctor if you can take an over-the-counter pain medicine, such as acetaminophen (Tylenol), ibuprofen (Advil, Motrin), or naproxen (Aleve). Be safe with medicines. Read and follow all instructions on the label. When should you call for help? Watch closely for changes in your health, and be sure to contact your doctor if:    · You get muscle cramps often that do not go away after home treatment.     · Your muscle cramps often wake you up at night.     · You do not get better as expected. Where can you learn more? Go to http://bal-mihir.info/. Enter L921 in the search box to learn more about \"Muscle Cramps: Care Instructions. \"  Current as of: September 20, 2018  Content Version: 12.1  © 7169-0128 [employer]. Care instructions adapted under license by [employer] (which disclaims liability or warranty for this information). If you have questions about a medical condition or this instruction, always ask your healthcare professional. [address] any warranty or liability for your use of this information.

## 2019-09-05 NOTE — PROGRESS NOTES
Chief Complaint   Patient presents with    Jaw Pain     follow up      1. Have you been to the ER, urgent care clinic since your last visit? Hospitalized since your last visit? No    2. Have you seen or consulted any other health care providers outside of the [address] [place] [address] since your last visit? Include any pap smears or colon screening.  No

## 2019-09-05 NOTE — LETTER
9/5/2019 10:41 AM 
 
Mr. Ole Moser [address] [facility] [address] [facility] [address] [facility], [place], [place] ?  
[phone] ? · [email or web address] [address] [facility], [address] 
[phone] Same fax number [facility] [address] [place] [address], [address]  
[phone] [facility] (CPAP supplies) [facility] [address] [place] [address] Phone: [phone] Fax: [phone]

## 2019-09-06 LAB
ALBUMIN SERPL-MCNC: 4.7 G/DL (ref 3.5–5.5)
ALBUMIN/GLOB SERPL: 1.7 {RATIO} (ref 1.2–2.2)
ALP SERPL-CCNC: 118 IU/L (ref 39–117)
ALT SERPL-CCNC: 28 IU/L (ref 0–44)
AST SERPL-CCNC: 22 IU/L (ref 0–40)
BASOPHILS # BLD AUTO: 0 X10E3/UL (ref 0–0.2)
BASOPHILS NFR BLD AUTO: 0 %
BILIRUB SERPL-MCNC: 0.4 MG/DL (ref 0–1.2)
BUN SERPL-MCNC: 12 MG/DL (ref 6–20)
BUN/CREAT SERPL: 11 (ref 9–20)
CALCIUM SERPL-MCNC: 9.7 MG/DL (ref 8.7–10.2)
CHLORIDE SERPL-SCNC: 100 MMOL/L (ref 96–106)
CHOLEST SERPL-MCNC: 116 MG/DL (ref 100–199)
CO2 SERPL-SCNC: 26 MMOL/L (ref 20–29)
CREAT SERPL-MCNC: 1.13 MG/DL (ref 0.76–1.27)
EOSINOPHIL # BLD AUTO: 0 X10E3/UL (ref 0–0.4)
EOSINOPHIL NFR BLD AUTO: 1 %
ERYTHROCYTE [DISTWIDTH] IN BLOOD BY AUTOMATED COUNT: 13.5 % (ref 12.3–15.4)
GLOBULIN SER CALC-MCNC: 2.7 G/DL (ref 1.5–4.5)
GLUCOSE SERPL-MCNC: 106 MG/DL (ref 65–99)
HCT VFR BLD AUTO: 45.5 % (ref 37.5–51)
HDLC SERPL-MCNC: 55 MG/DL
HGB BLD-MCNC: 15.4 G/DL (ref 13–17.7)
IMM GRANULOCYTES # BLD AUTO: 0 X10E3/UL (ref 0–0.1)
IMM GRANULOCYTES NFR BLD AUTO: 0 %
INTERPRETATION, 910389: NORMAL
LDLC SERPL CALC-MCNC: 49 MG/DL (ref 0–99)
LYMPHOCYTES # BLD AUTO: 1.2 X10E3/UL (ref 0.7–3.1)
LYMPHOCYTES NFR BLD AUTO: 22 %
MAGNESIUM SERPL-MCNC: 2 MG/DL (ref 1.6–2.3)
MCH RBC QN AUTO: 28.6 PG (ref 26.6–33)
MCHC RBC AUTO-ENTMCNC: 33.8 G/DL (ref 31.5–35.7)
MCV RBC AUTO: 85 FL (ref 79–97)
MONOCYTES # BLD AUTO: 0.3 X10E3/UL (ref 0.1–0.9)
MONOCYTES NFR BLD AUTO: 5 %
NEUTROPHILS # BLD AUTO: 4 X10E3/UL (ref 1.4–7)
NEUTROPHILS NFR BLD AUTO: 72 %
PLATELET # BLD AUTO: 238 X10E3/UL (ref 150–450)
POTASSIUM SERPL-SCNC: 4.6 MMOL/L (ref 3.5–5.2)
PROT SERPL-MCNC: 7.4 G/DL (ref 6–8.5)
RBC # BLD AUTO: 5.38 X10E6/UL (ref 4.14–5.8)
SODIUM SERPL-SCNC: 141 MMOL/L (ref 134–144)
TRIGL SERPL-MCNC: 60 MG/DL (ref 0–149)
TSH SERPL DL<=0.005 MIU/L-ACNC: 1.09 UIU/ML (ref 0.45–4.5)
VLDLC SERPL CALC-MCNC: 12 MG/DL (ref 5–40)
WBC # BLD AUTO: 5.6 X10E3/UL (ref 3.4–10.8)

## 2019-09-06 NOTE — PROGRESS NOTES
Sent in letter: All tests were normal, including blood counts,kidney, liver, sugar, electrolytes, thyroid, and cholesterol.

## 2019-10-15 RX ORDER — BENZTROPINE MESYLATE 1 MG/1
TABLET ORAL
Qty: 90 TAB | Refills: 6 | Status: SHIPPED | OUTPATIENT
Start: 2019-10-15 | End: 2021-01-18

## 2019-10-15 NOTE — TELEPHONE ENCOUNTER
Blu is calling from [facility] Pharm on file verified. They are requesting a refill on the following meds,    LOV 2019  Last refill.  2019    Requested Prescriptions     Pending Prescriptions Disp Refills    benztropine (COGENTIN) 1 mg tablet 90 Tab 6     Simg PO in AM, 1mg PO in PM

## 2019-10-15 NOTE — TELEPHONE ENCOUNTER
LOV 9/5/19  UOV 3/5/2020  LR 9/5/19 90tabs 6refills     Last filled at [facility] term pharmacy.  Patient now using retail pharmacy

## 2020-03-05 ENCOUNTER — OFFICE VISIT (OUTPATIENT)
Dept: FAMILY MEDICINE CLINIC | Age: 25
End: 2020-03-05

## 2020-03-05 VITALS
DIASTOLIC BLOOD PRESSURE: 80 MMHG | HEIGHT: 75 IN | OXYGEN SATURATION: 99 % | SYSTOLIC BLOOD PRESSURE: 134 MMHG | WEIGHT: 176.4 LBS | TEMPERATURE: 98 F | RESPIRATION RATE: 16 BRPM | HEART RATE: 95 BPM | BODY MASS INDEX: 21.93 KG/M2

## 2020-03-05 DIAGNOSIS — R63.5 WEIGHT GAIN: ICD-10-CM

## 2020-03-05 DIAGNOSIS — F20.3 UNDIFFERENTIATED SCHIZOPHRENIA (HCC): ICD-10-CM

## 2020-03-05 DIAGNOSIS — Z51.81 MEDICATION MONITORING ENCOUNTER: ICD-10-CM

## 2020-03-05 DIAGNOSIS — Z11.1 SCREENING-PULMONARY TB: ICD-10-CM

## 2020-03-05 DIAGNOSIS — R25.2 MUSCLE CRAMPING: ICD-10-CM

## 2020-03-05 DIAGNOSIS — F84.0 AUTISM: ICD-10-CM

## 2020-03-05 DIAGNOSIS — R68.84 JAW PAIN: Primary | ICD-10-CM

## 2020-03-05 RX ORDER — QUETIAPINE 300 MG/1
TABLET, FILM COATED, EXTENDED RELEASE ORAL
COMMUNITY
Start: 2020-03-02 | End: 2021-01-18

## 2020-03-05 NOTE — PROGRESS NOTES
Chief Complaint   Patient presents with    Anxiety     follow up    Medication Refill       Reviewed Record in preparation for visit and have obtained necessary documentation. Identified pt with two pt identifiers (Name @ )    There are no preventive care reminders to display for this patient. 1. Have you been to the ER, urgent care clinic since your last visit? Hospitalized since your last visit? No      2. Have you seen or consulted any other health care providers outside of the [address] [place] [address] since your last visit? Include any pap smears or colon screening.  no

## 2020-03-05 NOTE — LETTER
NOTIFICATION RETURN TO WORK / SCHOOL 
 
3/5/2020 11:39 AM 
 
Mr. Stacy Davidson [address] [place] [address] To Whom It May Concern: 
 
Stacy Davidson is currently under the care of KASH Ramires. He will return to work/school on: 3/9/20. If there are questions or concerns please have the patient contact our office. Sincerely, Amanda Owusu MD

## 2020-03-05 NOTE — PATIENT INSTRUCTIONS
Well Visit, Ages 25 to 48: Care Instructions  Your Care Instructions    Physical exams can help you stay healthy. Your doctor has checked your overall health and may have suggested ways to take good care of yourself. He or she also may have recommended tests. At home, you can help prevent illness with healthy eating, regular exercise, and other steps. Follow-up care is a key part of your treatment and safety. Be sure to make and go to all appointments, and call your doctor if you are having problems. It's also a good idea to know your test results and keep a list of the medicines you take. How can you care for yourself at home? · Reach and stay at a healthy weight. This will lower your risk for many problems, such as obesity, diabetes, heart disease, and high blood pressure. · Get at least 30 minutes of physical activity on most days of the week. Walking is a good choice. You also may want to do other activities, such as running, swimming, cycling, or playing tennis or team sports. Discuss any changes in your exercise program with your doctor. · Do not smoke or allow others to smoke around you. If you need help quitting, talk to your doctor about stop-smoking programs and medicines. These can increase your chances of quitting for good. · Talk to your doctor about whether you have any risk factors for sexually transmitted infections (STIs). Having one sex partner (who does not have STIs and does not have sex with anyone else) is a good way to avoid these infections. · Use birth control if you do not want to have children at this time. Talk with your doctor about the choices available and what might be best for you. · Protect your skin from too much sun. When you're outdoors from 10 a.m. to 4 p.m., stay in the shade or cover up with clothing and a hat with a wide brim. Wear sunglasses that block UV rays. Even when it's cloudy, put broad-spectrum sunscreen (SPF 30 or higher) on any exposed skin.   · See a dentist one or two times a year for checkups and to have your teeth cleaned. · Wear a seat belt in the car. Follow your doctor's advice about when to have certain tests. These tests can spot problems early. For everyone  · Cholesterol. Have the fat (cholesterol) in your blood tested after age 21. Your doctor will tell you how often to have this done based on your age, family history, or other things that can increase your risk for heart disease. · Blood pressure. Have your blood pressure checked during a routine doctor visit. Your doctor will tell you how often to check your blood pressure based on your age, your blood pressure results, and other factors. · Vision. Talk with your doctor about how often to have a glaucoma test.  · Diabetes. Ask your doctor whether you should have tests for diabetes. · Colon cancer. Your risk for colorectal cancer gets higher as you get older. Some experts say that adults should start regular screening at age 48 and stop at age 76. Others say to start before age 48 or continue after age 76. Talk with your doctor about your risk and when to start and stop screening. For women  · Breast exam and mammogram. Talk to your doctor about when you should have a clinical breast exam and a mammogram. Medical experts differ on whether and how often women under 50 should have these tests. Your doctor can help you decide what is right for you. · Cervical cancer screening test and pelvic exam. Begin with a Pap test at age 24. The test often is part of a pelvic exam. Starting at age [address] [address], you may choose to have a Pap test, an HPV test, or both tests at the same time (called co-testing). Talk with your doctor about how often to have testing. · Tests for sexually transmitted infections (STIs). Ask whether you should have tests for STIs. You may be at risk if you have sex with more than one person, especially if your partners do not wear condoms.   For men  · Tests for sexually transmitted infections (STIs). Ask whether you should have tests for STIs. You may be at risk if you have sex with more than one person, especially if you do not wear a condom. · Testicular cancer exam. Ask your doctor whether you should check your testicles regularly. · Prostate exam. Talk to your doctor about whether you should have a blood test (called a PSA test) for prostate cancer. Experts differ on whether and when men should have this test. Some experts suggest it if you are older than 39 and are -American or have a father or brother who got prostate cancer when he was younger than 72. When should you call for help? Watch closely for changes in your health, and be sure to contact your doctor if you have any problems or symptoms that concern you. Where can you learn more? Go to http://bal-mihir.info/. Enter P072 in the search box to learn more about \"Well Visit, Ages 25 to 48: Care Instructions. \"  Current as of: December 13, 2018  Content Version: 12.2  © 6550-9377 [employer]. Care instructions adapted under license by [employer] (which disclaims liability or warranty for this information). If you have questions about a medical condition or this instruction, always ask your healthcare professional. [place] [address] any warranty or liability for your use of this information.

## 2020-03-05 NOTE — PROGRESS NOTES
[facility]  [address] [place], [address]  [phone]             Date of visit: 3/5/2020   Subjective:      History obtained from:  Patient, mother. Filomena Arteaga is a 25 y.o. male who presents today for follow up chronic problems    When I saw him 6 mo ago he was still having the jaw pain that seemed to be muscle tension, as well as muscle cramps in his arms daily  We upped his cogentin dose  Says it is a lot better  Occasional jaw locking but infrequent, not like it used to be    Mom has noticed some tremors in his hands when he is just sitting there or maybe when he is holding something  Is able to write and use a spoon    Weight is up  Walks about every other day for 1 hour  Still drinking lots of water, some coffee, a little soda but not daily  Eats veggies  Not eating fast foods or junk foods    Still seeing psychiatry regularly for his schizophrenia, they want me to send them the labs  Still hearing the voices, they sometimes tell him to hurt himself   Not as bad as it used to be  Doing outpatient therapy once per week, that is helpful  Also some skill building to help his independence, mom thinks that is helpful    The group home drama got bad, moved back in with mom over the weekend  Going to look for another home  Still in day program since Nov, and he likes that    Patient Active Problem List    Diagnosis Date Noted    Autism 04/10/2019    Undifferentiated schizophrenia ([facility] [address] 04/09/2019    Jaw pain 04/09/2019    Right groin pain 04/09/2019     Current Outpatient Medications   Medication Sig Dispense Refill    QUEtiapine SR (SEROQUEL XR) 300 mg sr tablet       loratadine (CLARITIN) 10 mg tablet Take 1 Tab by mouth daily. 90 Tab 3    benztropine (COGENTIN) 1 mg tablet 2mg PO in AM, 1mg PO in PM 90 Tab 6    ARIPiprazole (ABILIFY) 20 mg tablet Take 1 Tab by mouth daily.       busPIRone (BUSPAR) 5 mg tablet 15mg in AM and 10mg in PM  Indications: Repeated Episodes of Anxiety 90 Tab 0    hydrOXYzine HCl (ATARAX) 50 mg tablet TK 1 T PO Q 6 H PRN FOR UP TO 10 DAYS  0    sertraline (ZOLOFT) 50 mg tablet Take 1 Tab by mouth daily. 30 Tab 0     Allergies   Allergen Reactions    Aspirin Hives     Past Medical History:   Diagnosis Date    Asperger syndrome     Schizophrenia ([address]     Scoliosis      Past Surgical History:   Procedure Laterality Date    HX CIRCUMCISION  1995    HX HERNIA REPAIR      as an infant     HX HERNIA REPAIR Bilateral 09/1995     Family History   Problem Relation Age of Onset    No Known Problems Mother     No Known Problems Father      Social History     Tobacco Use    Smoking status: Never Smoker    Smokeless tobacco: Never Used   Substance Use Topics    Alcohol use: Never     Frequency: Never      Social History     Patient does not qualify to have social determinant information on file (likely too young). Social History Narrative    Lives with mom    Goes to day program      Review of Systems  Gen: denies fever   GI denies constipation     Objective:     Vitals:    03/05/20 1108   BP: 134/80   Pulse: 95   Resp: 16   Temp: 98 °F (36.7 °C)   TempSrc: Oral   SpO2: 99%   Weight: 176 lb 6.4 oz (80 kg)   Height: 6' 3\" (1.905 m)     Body mass index is 22.05 kg/m². General: stated age, well-developed, and in NAD  Eyes: PERRL, EOMI, no redness or drainage  Nose: no drainage  Mouth: no lesions  Ears: TMs clear bilaterally  Throat: no erythema, exudate or swelling  Neck: supple, symmetrical, trachea midline, no adenopathy and thyroid: not enlarged, symmetric, no tenderness/mass/nodules  Lungs:  clear to auscultation w/o rales, rhonchi, wheezes w/normal effort and no use of accessory muscles of respiration   Heart: regular rate and rhythm, S1, S2 normal, no murmur, click, rub or gallop  Abdomen: soft, nontender, no masses  Ext:  No edema noted.    Lymph: no cervical adenopathy appreciated  Skin:  Normal. and no rash or abnormalities Neuro: normal gait, CN 2-12 intact  Psych: alert and oriented to person, place, time and situation and Speech: appropriate quality, quantity and organization of sentences, little flat affect but normal for him    Assessment/Plan:       ICD-10-CM ICD-9-CM    1. Jaw pain R68.84 784.92    2. Undifferentiated schizophrenia ([facility] [address] F20.3 295.90 CBC WITH AUTOMATED DIFF      METABOLIC PANEL, COMPREHENSIVE      LIPID PANEL      HEMOGLOBIN A1C WITH EAG   3. Autism F84.0 299.00    4. Muscle cramping R25.2 729.82    5. Medication monitoring encounter Z51.81 V58.83 CBC WITH AUTOMATED DIFF      METABOLIC PANEL, COMPREHENSIVE      LIPID PANEL      HEMOGLOBIN A1C WITH EAG   6. Weight gain R63.5 783.1    7. Screening-pulmonary TB Z11.1 V74.1 QUANTIFERON-TB GOLD PLUS        Orders Placed This Encounter    QUANTIFERON-TB GOLD PLUS    CBC WITH AUTOMATED DIFF    METABOLIC PANEL, COMPREHENSIVE    LIPID PANEL    HEMOGLOBIN A1C WITH EAG    QUEtiapine SR (SEROQUEL XR) 300 mg sr tablet       Jaw pain and muscle cramps are better--seems the cogentin helped  schizo symptoms some better  Followed closely by psych and now has a therapist and support services  Will do labs  Goes to day program and looking for new group home; tb test today  Encouraged to keep watching, watching diet to counteract weight gain but still at healthy weight    Discussed the diagnosis and plan and he expressed understanding. Follow-up and Dispositions    · Return in about 6 months (around 9/5/2020).          Joanie White MD

## 2020-03-08 LAB
ALBUMIN SERPL-MCNC: 4.6 G/DL (ref 4.1–5.2)
ALBUMIN/GLOB SERPL: 1.9 {RATIO} (ref 1.2–2.2)
ALP SERPL-CCNC: 101 IU/L (ref 39–117)
ALT SERPL-CCNC: 22 IU/L (ref 0–44)
AST SERPL-CCNC: 20 IU/L (ref 0–40)
BASOPHILS # BLD AUTO: 0 X10E3/UL (ref 0–0.2)
BASOPHILS NFR BLD AUTO: 0 %
BILIRUB SERPL-MCNC: 0.3 MG/DL (ref 0–1.2)
BUN SERPL-MCNC: 12 MG/DL (ref 6–20)
BUN/CREAT SERPL: 10 (ref 9–20)
CALCIUM SERPL-MCNC: 9.3 MG/DL (ref 8.7–10.2)
CHLORIDE SERPL-SCNC: 102 MMOL/L (ref 96–106)
CHOLEST SERPL-MCNC: 113 MG/DL (ref 100–199)
CO2 SERPL-SCNC: 25 MMOL/L (ref 20–29)
CREAT SERPL-MCNC: 1.26 MG/DL (ref 0.76–1.27)
EOSINOPHIL # BLD AUTO: 0 X10E3/UL (ref 0–0.4)
EOSINOPHIL NFR BLD AUTO: 1 %
ERYTHROCYTE [DISTWIDTH] IN BLOOD BY AUTOMATED COUNT: 12.7 % (ref 11.6–15.4)
EST. AVERAGE GLUCOSE BLD GHB EST-MCNC: 105 MG/DL
GAMMA INTERFERON BACKGROUND BLD IA-ACNC: 0.02 IU/ML
GLOBULIN SER CALC-MCNC: 2.4 G/DL (ref 1.5–4.5)
GLUCOSE SERPL-MCNC: 120 MG/DL (ref 65–99)
HBA1C MFR BLD: 5.3 % (ref 4.8–5.6)
HCT VFR BLD AUTO: 44.4 % (ref 37.5–51)
HDLC SERPL-MCNC: 46 MG/DL
HGB BLD-MCNC: 14.4 G/DL (ref 13–17.7)
IMM GRANULOCYTES # BLD AUTO: 0 X10E3/UL (ref 0–0.1)
IMM GRANULOCYTES NFR BLD AUTO: 0 %
INTERPRETATION, 910389: NORMAL
LDLC SERPL CALC-MCNC: 43 MG/DL (ref 0–99)
LYMPHOCYTES # BLD AUTO: 1.4 X10E3/UL (ref 0.7–3.1)
LYMPHOCYTES NFR BLD AUTO: 30 %
M TB IFN-G BLD-IMP: NEGATIVE
M TB IFN-G CD4+ BCKGRND COR BLD-ACNC: 0.02 IU/ML
MCH RBC QN AUTO: 28.1 PG (ref 26.6–33)
MCHC RBC AUTO-ENTMCNC: 32.4 G/DL (ref 31.5–35.7)
MCV RBC AUTO: 87 FL (ref 79–97)
MITOGEN IGNF BLD-ACNC: >10 IU/ML
MONOCYTES # BLD AUTO: 0.3 X10E3/UL (ref 0.1–0.9)
MONOCYTES NFR BLD AUTO: 6 %
NEUTROPHILS # BLD AUTO: 3 X10E3/UL (ref 1.4–7)
NEUTROPHILS NFR BLD AUTO: 63 %
PLATELET # BLD AUTO: 230 X10E3/UL (ref 150–450)
POTASSIUM SERPL-SCNC: 4.3 MMOL/L (ref 3.5–5.2)
PROT SERPL-MCNC: 7 G/DL (ref 6–8.5)
QUANTIFERON INCUBATION, QF1T: NORMAL
QUANTIFERON TB2 AG: 0.02 IU/ML
RBC # BLD AUTO: 5.12 X10E6/UL (ref 4.14–5.8)
SERVICE CMNT-IMP: NORMAL
SODIUM SERPL-SCNC: 139 MMOL/L (ref 134–144)
TRIGL SERPL-MCNC: 120 MG/DL (ref 0–149)
VLDLC SERPL CALC-MCNC: 24 MG/DL (ref 5–40)
WBC # BLD AUTO: 4.7 X10E3/UL (ref 3.4–10.8)

## 2020-03-10 NOTE — PROGRESS NOTES
Sent in letter: All labs were very good, including kidney, liver, sugar, electrolytes, blood counts, and cholesterol. Your TB screening test was negative.

## 2020-12-02 ENCOUNTER — TELEPHONE (OUTPATIENT)
Dept: NEUROLOGY | Age: 25
End: 2020-12-02

## 2020-12-02 NOTE — TELEPHONE ENCOUNTER
----- Message from Libia Larry sent at 12/2/2020 11:54 AM EST -----  Regarding: Dr. Ledbetter Knee: [phone]  Appointment not available    Caller's first and last name and relationship to patient (if not the patient):  Mother      Best contact number: [phone]      Preferred date and time: n/a      Scheduled appointment date and time: n/a      Reason for appointment: Np pt appt for evaluation      Details to clarify the request: n/a      Libia Larry

## 2020-12-07 ENCOUNTER — TELEPHONE (OUTPATIENT)
Dept: NEUROLOGY | Age: 25
End: 2020-12-07

## 2020-12-07 NOTE — TELEPHONE ENCOUNTER
----- Message from Galilea Tristan sent at 12/7/2020  9:38 AM EST -----  Regarding: Dr. Meli Polanco telephone  Caller's first and last name:Dolores Diaz        Reason for call: schedule new pt testing         Callback required yes/no and why:yes        Best contact number(s): [phone]        Details to clarify the request: Pt mom requesting a call back to schedule a re-take as an adult testing .

## 2020-12-07 NOTE — TELEPHONE ENCOUNTER
----- Message from Rosy Okaley sent at 12/7/2020  9:38 AM EST -----  Regarding: Dr. Kayla Diaz telephone  Caller's first and last name:Dolores Emmanuel        Reason for call: schedule new pt testing         Callback required yes/no and why:yes        Best contact number(s): [phone]        Details to clarify the request: Pt mom requesting a call back to schedule a re-take as an adult testing .

## 2020-12-10 ENCOUNTER — TELEPHONE (OUTPATIENT)
Dept: NEUROLOGY | Age: 25
End: 2020-12-10

## 2020-12-10 NOTE — TELEPHONE ENCOUNTER
----- Message from Jong Barker sent at 12/10/2020  2:24 PM EST -----  Regarding: Dr. Kianna Sky first and last name:Dolores Page(mother)      Reason for call:new pt appt      Callback required yes/no and why:yes      Best contact number(s)[phone]      Details to clarify the request:Pt's mother stated she has left several messages for a call back for a new pt appt and still have received no call back.       Jong Barker

## 2021-01-13 NOTE — PATIENT INSTRUCTIONS
Maybe seeing a dentist would be helpful to see if a  device would help your jaw I will send your labs to your psychiatrist 
 
  
Well Visit, Ages 25 to 48: Care Instructions Your Care Instructions Physical exams can help you stay healthy. Your doctor has checked your overall health and may have suggested ways to take good care of yourself. He or she also may have recommended tests. At home, you can help prevent illness with healthy eating, regular exercise, and other steps. Follow-up care is a key part of your treatment and safety. Be sure to make and go to all appointments, and call your doctor if you are having problems. It's also a good idea to know your test results and keep a list of the medicines you take. How can you care for yourself at home? · Reach and stay at a healthy weight. This will lower your risk for many problems, such as obesity, diabetes, heart disease, and high blood pressure. · Get at least 30 minutes of physical activity on most days of the week. Walking is a good choice. You also may want to do other activities, such as running, swimming, cycling, or playing tennis or team sports. Discuss any changes in your exercise program with your doctor. · Do not smoke or allow others to smoke around you. If you need help quitting, talk to your doctor about stop-smoking programs and medicines. These can increase your chances of quitting for good. · Talk to your doctor about whether you have any risk factors for sexually transmitted infections (STIs). Having one sex partner (who does not have STIs and does not have sex with anyone else) is a good way to avoid these infections. · Use birth control if you do not want to have children at this time. Talk with your doctor about the choices available and what might be best for you. · Protect your skin from too much sun. When you're outdoors from 10 a.m. to 4 p.m., stay in the shade or cover up with clothing and a hat with a wide brim. Wear sunglasses that block UV rays. Even when it's cloudy, put broad-spectrum sunscreen (SPF 30 or higher) on any exposed skin. · See a dentist one or two times a year for checkups and to have your teeth cleaned. · Wear a seat belt in the car. Follow your doctor's advice about when to have certain tests. These tests can spot problems early. For everyone · Cholesterol. Have the fat (cholesterol) in your blood tested after age 21. Your doctor will tell you how often to have this done based on your age, family history, or other things that can increase your risk for heart disease. · Blood pressure. Have your blood pressure checked during a routine doctor visit. Your doctor will tell you how often to check your blood pressure based on your age, your blood pressure results, and other factors. · Vision. Talk with your doctor about how often to have a glaucoma test. 
· Diabetes. Ask your doctor whether you should have tests for diabetes. · Colon cancer. Your risk for colorectal cancer gets higher as you get older. Some experts say that adults should start regular screening at age 48 and stop at age 76. Others say to start before age 48 or continue after age 76. Talk with your doctor about your risk and when to start and stop screening. For women · Breast exam and mammogram. Talk to your doctor about when you should have a clinical breast exam and a mammogram. Medical experts differ on whether and how often women under 50 should have these tests. Your doctor can help you decide what is right for you. · Cervical cancer screening test and pelvic exam. Begin with a Pap test at age 24. The test often is part of a pelvic exam. Starting at age 27, you may choose to have a Pap test, an HPV test, or both tests at the same time (called co-testing). Talk with your doctor about how often to have testing. · Tests for sexually transmitted infections (STIs). Ask whether you should have tests for STIs. You may be at risk if you have sex with more than one person, especially if your partners do not wear condoms. For men · Tests for sexually transmitted infections (STIs). Ask whether you should have tests for STIs. You may be at risk if you have sex with more than one person, especially if you do not wear a condom. · Testicular cancer exam. Ask your doctor whether you should check your testicles regularly. · Prostate exam. Talk to your doctor about whether you should have a blood test (called a PSA test) for prostate cancer. Experts differ on whether and when men should have this test. Some experts suggest it if you are older than 39 and are -American or have a father or brother who got prostate cancer when he was younger than 72. When should you call for help? Watch closely for changes in your health, and be sure to contact your doctor if you have any problems or symptoms that concern you. Where can you learn more? Go to http://www.[employer].com/ Enter P072 in the search box to learn more about \"Well Visit, Ages 25 to 48: Care Instructions. \" Current as of: May 27, 2020               Content Version: 12.6 © 2006-2020 [employer]. Care instructions adapted under license by [employer] (which disclaims liability or warranty for this information). If you have questions about a medical condition or this instruction, always ask your healthcare professional. [place] [address] any warranty or liability for your use of this information.

## 2021-01-13 NOTE — PROGRESS NOTES
Andi Reyna [facility]  [place], [address]  [phone]             Date of visit: 1/14/21   Subjective:      History obtained from:  Patient, mother. Cony Mondragon is a 22 y.o. male who presents today for   Chief Complaint   Patient presents with    Complete Physical     The jaw pain and muscle cramps in arms that improved with cogentin   Still gets some painful cramps but not very often    Walking for exercise regularly  Eating veggies   Drinking mostly water     Weight last year was 176, now 158; he didn't realize he had lost weight    Had another hospitalization in 10/2020, was just too much. The voices are really annoying to him  Still seeing psychiatry regularly for his schizophrenia, they want me to send them the labs   Still hearing the voices, they sometimes tell him to hurt himself   Not as bad as it used to be   Doing outpatient therapy once per week, that is helpful Anita   Harvel Olszewski at hospital, was dizzy/drowsy from medication  Still having some pain in his jaw  Abi Rides on the floor and cut under chin  Didn't need stitches  Healed up ok now    Tingling vein left temporal    In day program in the past but not since covid    Uses claritin for allergies     Flu shot declined    Patient Active Problem List    Diagnosis Date Noted    Autism 04/10/2019    Undifferentiated schizophrenia ([facility] 04/09/2019    Jaw pain 04/09/2019    Right groin pain 04/09/2019     Current Outpatient Medications   Medication Sig Dispense Refill    loratadine (CLARITIN) 10 mg tablet TAKE 1 TABLET BY MOUTH EVERY DAY 30 Tab 12    QUEtiapine SR (SEROQUEL XR) 300 mg sr tablet       benztropine (COGENTIN) 1 mg tablet 2mg PO in AM, 1mg PO in PM 90 Tab 6    ARIPiprazole (ABILIFY) 20 mg tablet Take 1 Tab by mouth daily.       busPIRone (BUSPAR) 5 mg tablet 15mg in AM and 10mg in PM  Indications: Repeated Episodes of Anxiety 90 Tab 0    sertraline (ZOLOFT) 50 mg tablet Take 1 Tab by mouth daily. 30 Tab 0    hydrOXYzine HCl (ATARAX) 50 mg tablet TK 1 T PO Q 6 H PRN FOR UP TO 10 DAYS  0     Allergies   Allergen Reactions    Aspirin Hives     Past Medical History:   Diagnosis Date    Asperger syndrome     Schizophrenia ([address]     Scoliosis      Past Surgical History:   Procedure Laterality Date    HX CIRCUMCISION  1995    HX HERNIA REPAIR      as an infant     HX HERNIA REPAIR Bilateral 09/1995     Family History   Problem Relation Age of Onset    No Known Problems Mother     No Known Problems Father      Social History     Tobacco Use    Smoking status: Never Smoker    Smokeless tobacco: Never Used   Substance Use Topics    Alcohol use: Never     Frequency: Never      Social History     Social History Narrative    Lives with mom    Goes to day program      Review of Systems  Card: denies chest pain  Pulm: denies shortness of breath      Objective:     Vitals:    01/14/21 1032   BP: 120/66   Pulse: 80   Resp: 18   Temp: 98.2 °F (36.8 °C)   TempSrc: Temporal   SpO2: 98%   Weight: 158 lb 3.2 oz (71.8 kg)   Height: 6' 3\" (1.905 m)     Body mass index is 19.77 kg/m². General: stated age, well-developed, and in NAD  Eyes: PERRL, EOMI, no redness or drainage  Nose: no drainage  Mouth: no lesions  Throat: no erythema, exudate or swelling  Neck: supple, symmetrical, trachea midline, no adenopathy and thyroid: not enlarged, symmetric, no tenderness/mass/nodules  Lungs:  clear to auscultation w/o rales, rhonchi, wheezes w/normal effort and no use of accessory muscles of respiration   Heart: regular rate and rhythm, S1, S2 normal, no murmur, click, rub or gallop  Abdomen: soft, nontender, no masses  Ext:  No edema noted.    Lymph: no cervical adenopathy appreciated  Skin:  Normal. and no rash or abnormalities   Neuro: normal gait, CN 2-12 intact  Psych: alert and mildly anxious affect, mild psychomotor agitation  Assessment/Plan:       ICD-10-CM ICD-9-CM    1. Jaw pain R68.84 784.92    2. Muscle cramping  R25.2 729.82    3. Undifferentiated schizophrenia ([facility]  F20.3 295.90 TSH 3RD GENERATION      CBC WITH AUTOMATED DIFF      METABOLIC PANEL, COMPREHENSIVE      LIPID PANEL      LIPID PANEL      METABOLIC PANEL, COMPREHENSIVE      CBC WITH AUTOMATED DIFF      TSH 3RD GENERATION   4. Autism  F84.0 299.00    5. Encounter for immunization  Z23 V03.89 INFLUENZA VIRUS VAC QUAD,SPLIT,PRESV FREE SYRINGE IM      CANCELED: INFLUENZA VIRUS VAC QUAD,SPLIT,PRESV FREE SYRINGE IM   6. Medication monitoring encounter  Z51.81 V58.83 TSH 3RD GENERATION      CBC WITH AUTOMATED DIFF      METABOLIC PANEL, COMPREHENSIVE      LIPID PANEL      LIPID PANEL      METABOLIC PANEL, COMPREHENSIVE      CBC WITH AUTOMATED DIFF      TSH 3RD GENERATION        Orders Placed This Encounter    INFLUENZA VIRUS VAC QUAD,SPLIT,PRESV FREE SYRINGE IM (Flulaval, Fluzone, Fluarix) (34885)    TSH 3RD GENERATION    CBC WITH AUTOMATED DIFF    METABOLIC PANEL, COMPREHENSIVE    LIPID PANEL     Chronic problems stable  No med changes  Routine labs as above and will send to [facility] where he sees psychiatry  Healthy diet, exercise enocuraged    Discussed the diagnosis and plan and he expressed understanding. Follow-up and Dispositions    · Return in about 1 year (around 1/14/2022).          Bradly Sood MD

## 2021-01-14 ENCOUNTER — OFFICE VISIT (OUTPATIENT)
Dept: FAMILY MEDICINE CLINIC | Age: 26
End: 2021-01-14
Payer: MEDICAID

## 2021-01-14 VITALS
SYSTOLIC BLOOD PRESSURE: 120 MMHG | RESPIRATION RATE: 18 BRPM | HEART RATE: 80 BPM | OXYGEN SATURATION: 98 % | DIASTOLIC BLOOD PRESSURE: 66 MMHG | WEIGHT: 158.2 LBS | BODY MASS INDEX: 19.67 KG/M2 | HEIGHT: 75 IN | TEMPERATURE: 98.2 F

## 2021-01-14 DIAGNOSIS — R68.84 JAW PAIN: Primary | ICD-10-CM

## 2021-01-14 DIAGNOSIS — F84.0 AUTISM: ICD-10-CM

## 2021-01-14 DIAGNOSIS — R25.2 MUSCLE CRAMPING: ICD-10-CM

## 2021-01-14 DIAGNOSIS — Z23 ENCOUNTER FOR IMMUNIZATION: ICD-10-CM

## 2021-01-14 DIAGNOSIS — Z51.81 MEDICATION MONITORING ENCOUNTER: ICD-10-CM

## 2021-01-14 DIAGNOSIS — F20.3 UNDIFFERENTIATED SCHIZOPHRENIA (HCC): ICD-10-CM

## 2021-01-14 PROCEDURE — 99214 OFFICE O/P EST MOD 30 MIN: CPT | Performed by: FAMILY MEDICINE

## 2021-01-14 PROCEDURE — 90686 IIV4 VACC NO PRSV 0.5 ML IM: CPT | Performed by: FAMILY MEDICINE

## 2021-01-14 PROCEDURE — 90471 IMMUNIZATION ADMIN: CPT | Performed by: FAMILY MEDICINE

## 2021-01-14 NOTE — PROGRESS NOTES
Chief Complaint   Patient presents with    Complete Physical       1. Have you been to the ER, urgent care clinic since your last visit? Hospitalized since your last visit? Yes When: Tigre Hines in 10/2020 for mental health concerns. 2. Have you seen or consulted any other health care providers outside of the [address] [place] [address] since your last visit? Include any pap smears or colon screening. No    3 most recent PHQ Screens 1/14/2021   Little interest or pleasure in doing things Not at all   Feeling down, depressed, irritable, or hopeless Not at all   Total Score PHQ 2 0       Abuse Screening Questionnaire 1/14/2021   Do you ever feel afraid of your partner? N   Are you in a relationship with someone who physically or mentally threatens you? N   Is it safe for you to go home? Y       Health Maintenance Due   Topic Date Due    Flu Vaccine (1) 09/01/2020     Patient received flu vaccine today in left deltoid with no adverse reactions. Patient tolerated vaccine well.

## 2021-01-15 LAB
ALBUMIN SERPL-MCNC: 4.7 G/DL (ref 3.5–5)
ALBUMIN/GLOB SERPL: 1.5 {RATIO} (ref 1.1–2.2)
ALP SERPL-CCNC: 110 U/L (ref 45–117)
ALT SERPL-CCNC: 27 U/L (ref 12–78)
ANION GAP SERPL CALC-SCNC: 4 MMOL/L (ref 5–15)
AST SERPL-CCNC: 16 U/L (ref 15–37)
BASOPHILS # BLD: 0 K/UL (ref 0–0.1)
BASOPHILS NFR BLD: 1 % (ref 0–1)
BILIRUB SERPL-MCNC: 0.5 MG/DL (ref 0.2–1)
BUN SERPL-MCNC: 12 MG/DL (ref 6–20)
BUN/CREAT SERPL: 9 (ref 12–20)
CALCIUM SERPL-MCNC: 9.9 MG/DL (ref 8.5–10.1)
CHLORIDE SERPL-SCNC: 105 MMOL/L (ref 97–108)
CHOLEST SERPL-MCNC: 118 MG/DL
CO2 SERPL-SCNC: 29 MMOL/L (ref 21–32)
CREAT SERPL-MCNC: 1.27 MG/DL (ref 0.7–1.3)
DIFFERENTIAL METHOD BLD: NORMAL
EOSINOPHIL # BLD: 0 K/UL (ref 0–0.4)
EOSINOPHIL NFR BLD: 1 % (ref 0–7)
ERYTHROCYTE [DISTWIDTH] IN BLOOD BY AUTOMATED COUNT: 13.3 % (ref 11.5–14.5)
GLOBULIN SER CALC-MCNC: 3.1 G/DL (ref 2–4)
GLUCOSE SERPL-MCNC: 96 MG/DL (ref 65–100)
HCT VFR BLD AUTO: 44.8 % (ref 36.6–50.3)
HDLC SERPL-MCNC: 65 MG/DL
HDLC SERPL: 1.8 {RATIO} (ref 0–5)
HGB BLD-MCNC: 14.5 G/DL (ref 12.1–17)
IMM GRANULOCYTES # BLD AUTO: 0 K/UL (ref 0–0.04)
IMM GRANULOCYTES NFR BLD AUTO: 0 % (ref 0–0.5)
LDLC SERPL CALC-MCNC: 46.4 MG/DL (ref 0–100)
LIPID PROFILE,FLP: NORMAL
LYMPHOCYTES # BLD: 1.1 K/UL (ref 0.8–3.5)
LYMPHOCYTES NFR BLD: 26 % (ref 12–49)
MCH RBC QN AUTO: 27.3 PG (ref 26–34)
MCHC RBC AUTO-ENTMCNC: 32.4 G/DL (ref 30–36.5)
MCV RBC AUTO: 84.4 FL (ref 80–99)
MONOCYTES # BLD: 0.3 K/UL (ref 0–1)
MONOCYTES NFR BLD: 6 % (ref 5–13)
NEUTS SEG # BLD: 2.9 K/UL (ref 1.8–8)
NEUTS SEG NFR BLD: 66 % (ref 32–75)
NRBC # BLD: 0 K/UL (ref 0–0.01)
NRBC BLD-RTO: 0 PER 100 WBC
PLATELET # BLD AUTO: 238 K/UL (ref 150–400)
PMV BLD AUTO: 9.8 FL (ref 8.9–12.9)
POTASSIUM SERPL-SCNC: 4.4 MMOL/L (ref 3.5–5.1)
PROT SERPL-MCNC: 7.8 G/DL (ref 6.4–8.2)
RBC # BLD AUTO: 5.31 M/UL (ref 4.1–5.7)
SODIUM SERPL-SCNC: 138 MMOL/L (ref 136–145)
TRIGL SERPL-MCNC: 33 MG/DL (ref ?–150)
TSH SERPL DL<=0.05 MIU/L-ACNC: 0.86 UIU/ML (ref 0.36–3.74)
VLDLC SERPL CALC-MCNC: 6.6 MG/DL
WBC # BLD AUTO: 4.4 K/UL (ref 4.1–11.1)

## 2021-01-15 NOTE — PROGRESS NOTES
Sent in letter: All labs were good, including thyroid, blood counts, kidney, liver, sugar, and cholesterol. Keep up the good work!

## 2021-01-17 ENCOUNTER — HOSPITAL ENCOUNTER (INPATIENT)
Age: 26
LOS: 9 days | Discharge: HOME | DRG: 750 | End: 2021-01-26
Attending: EMERGENCY MEDICINE | Admitting: PSYCHIATRY & NEUROLOGY
Payer: MEDICAID

## 2021-01-17 DIAGNOSIS — F84.0 AUTISM SPECTRUM: ICD-10-CM

## 2021-01-17 DIAGNOSIS — F20.89 OTHER SCHIZOPHRENIA (HCC): Primary | ICD-10-CM

## 2021-01-17 PROBLEM — F20.9 SCHIZOPHRENIA (HCC): Status: ACTIVE | Noted: 2021-01-17

## 2021-01-17 LAB
ALBUMIN SERPL-MCNC: 4.1 G/DL (ref 3.5–5)
ALBUMIN/GLOB SERPL: 1.2 {RATIO} (ref 1.1–2.2)
ALP SERPL-CCNC: 112 U/L (ref 45–117)
ALT SERPL-CCNC: 23 U/L (ref 12–78)
AMPHET UR QL SCN: NEGATIVE
ANION GAP SERPL CALC-SCNC: 6 MMOL/L (ref 5–15)
APPEARANCE UR: CLEAR
AST SERPL-CCNC: 21 U/L (ref 15–37)
BACTERIA URNS QL MICRO: NEGATIVE /HPF
BARBITURATES UR QL SCN: NEGATIVE
BASOPHILS # BLD: 0 K/UL (ref 0–0.1)
BASOPHILS NFR BLD: 0 % (ref 0–1)
BENZODIAZ UR QL: NEGATIVE
BILIRUB SERPL-MCNC: 0.5 MG/DL (ref 0.2–1)
BILIRUB UR QL: NEGATIVE
BUN SERPL-MCNC: 13 MG/DL (ref 6–20)
BUN/CREAT SERPL: 9 (ref 12–20)
CALCIUM SERPL-MCNC: 8.9 MG/DL (ref 8.5–10.1)
CANNABINOIDS UR QL SCN: NEGATIVE
CHLORIDE SERPL-SCNC: 106 MMOL/L (ref 97–108)
CO2 SERPL-SCNC: 30 MMOL/L (ref 21–32)
COCAINE UR QL SCN: NEGATIVE
COLOR UR: NORMAL
COVID-19 RAPID TEST, COVR: NOT DETECTED
CREAT SERPL-MCNC: 1.52 MG/DL (ref 0.7–1.3)
DIFFERENTIAL METHOD BLD: ABNORMAL
DRUG SCRN COMMENT,DRGCM: NORMAL
EOSINOPHIL # BLD: 0 K/UL (ref 0–0.4)
EOSINOPHIL NFR BLD: 0 % (ref 0–7)
EPITH CASTS URNS QL MICRO: NORMAL /LPF
ERYTHROCYTE [DISTWIDTH] IN BLOOD BY AUTOMATED COUNT: 13.5 % (ref 11.5–14.5)
ETHANOL SERPL-MCNC: <10 MG/DL
GLOBULIN SER CALC-MCNC: 3.3 G/DL (ref 2–4)
GLUCOSE SERPL-MCNC: 89 MG/DL (ref 65–100)
GLUCOSE UR STRIP.AUTO-MCNC: NEGATIVE MG/DL
HCT VFR BLD AUTO: 41.4 % (ref 36.6–50.3)
HEALTH STATUS, XMCV2T: NORMAL
HGB BLD-MCNC: 13.4 G/DL (ref 12.1–17)
HGB UR QL STRIP: NEGATIVE
IMM GRANULOCYTES # BLD AUTO: 0 K/UL (ref 0–0.04)
IMM GRANULOCYTES NFR BLD AUTO: 1 % (ref 0–0.5)
KETONES UR QL STRIP.AUTO: NEGATIVE MG/DL
LEUKOCYTE ESTERASE UR QL STRIP.AUTO: NEGATIVE
LYMPHOCYTES # BLD: 0.7 K/UL (ref 0.8–3.5)
LYMPHOCYTES NFR BLD: 16 % (ref 12–49)
MCH RBC QN AUTO: 27.3 PG (ref 26–34)
MCHC RBC AUTO-ENTMCNC: 32.4 G/DL (ref 30–36.5)
MCV RBC AUTO: 84.5 FL (ref 80–99)
METHADONE UR QL: NEGATIVE
MONOCYTES # BLD: 0.2 K/UL (ref 0–1)
MONOCYTES NFR BLD: 4 % (ref 5–13)
NEUTS SEG # BLD: 3.3 K/UL (ref 1.8–8)
NEUTS SEG NFR BLD: 79 % (ref 32–75)
NITRITE UR QL STRIP.AUTO: NEGATIVE
NRBC # BLD: 0 K/UL (ref 0–0.01)
NRBC BLD-RTO: 0 PER 100 WBC
OPIATES UR QL: NEGATIVE
PCP UR QL: NEGATIVE
PH UR STRIP: 7 [PH] (ref 5–8)
PLATELET # BLD AUTO: 212 K/UL (ref 150–400)
PMV BLD AUTO: 9.7 FL (ref 8.9–12.9)
POTASSIUM SERPL-SCNC: 5.1 MMOL/L (ref 3.5–5.1)
PROT SERPL-MCNC: 7.4 G/DL (ref 6.4–8.2)
PROT UR STRIP-MCNC: NEGATIVE MG/DL
RBC # BLD AUTO: 4.9 M/UL (ref 4.1–5.7)
RBC #/AREA URNS HPF: NORMAL /HPF (ref 0–5)
RBC MORPH BLD: ABNORMAL
SODIUM SERPL-SCNC: 142 MMOL/L (ref 136–145)
SOURCE, COVRS: NORMAL
SP GR UR REFRACTOMETRY: 1.01 (ref 1–1.03)
SPECIMEN SOURCE, FCOV2M: NORMAL
SPECIMEN TYPE, XMCV1T: NORMAL
UA: UC IF INDICATED,UAUC: NORMAL
UROBILINOGEN UR QL STRIP.AUTO: 1 EU/DL (ref 0.2–1)
WBC # BLD AUTO: 4.2 K/UL (ref 4.1–11.1)
WBC URNS QL MICRO: NORMAL /HPF (ref 0–4)

## 2021-01-17 PROCEDURE — 87635 SARS-COV-2 COVID-19 AMP PRB: CPT

## 2021-01-17 PROCEDURE — 80053 COMPREHEN METABOLIC PANEL: CPT

## 2021-01-17 PROCEDURE — 80307 DRUG TEST PRSMV CHEM ANLYZR: CPT

## 2021-01-17 PROCEDURE — 36415 COLL VENOUS BLD VENIPUNCTURE: CPT

## 2021-01-17 PROCEDURE — U0005 INFEC AGEN DETEC AMPLI PROBE: HCPCS

## 2021-01-17 PROCEDURE — 82077 ASSAY SPEC XCP UR&BREATH IA: CPT

## 2021-01-17 PROCEDURE — 85025 COMPLETE CBC W/AUTO DIFF WBC: CPT

## 2021-01-17 PROCEDURE — 81001 URINALYSIS AUTO W/SCOPE: CPT

## 2021-01-17 PROCEDURE — 90791 PSYCH DIAGNOSTIC EVALUATION: CPT

## 2021-01-17 PROCEDURE — 65270000029 HC RM PRIVATE

## 2021-01-17 PROCEDURE — 99283 EMERGENCY DEPT VISIT LOW MDM: CPT

## 2021-01-17 NOTE — ED NOTES
Patient stated he now knows he is waiting \"to go someplace\".   Mirela Aviles he is in the hospital

## 2021-01-17 NOTE — ED PROVIDER NOTES
EMERGENCY DEPARTMENT HISTORY AND PHYSICAL EXAM    Date: 1/17/2021  Patient Name: Oh Cabrera    History of Presenting Illness     Chief Complaint   Patient presents with   [address] Problem         History Provided By: Patient    HPI: Oh Cabrera is a 22 y.o. male with a PMH of Asperger's syndrome, schizophrenia, scoliosis who presents with complaints of hearing voices that are telling him to shoot himself. Patient states he does not want to hurt himself but states that he is afraid of what he may do. Patient also reports that he is homeless. Patient is willing to go to group home if possible. Patient has no physical complaints at this time. Patient states he has been taking his Abilify, Seroquel, BuSpar, Cogentin and melatonin. PCP: Wagner Aceves MD    Current Outpatient Medications   Medication Sig Dispense Refill    ARIPiprazole (ABILIFY) 10 mg tablet Take 10 mg by mouth daily. Indications: schizophrenia      benztropine (COGENTIN) 1 mg tablet Take 1 mg by mouth two (2) times a day. Indications: extrapyramidal symptoms as a result of taking the medication      busPIRone (BUSPAR) 10 mg tablet Take 10 mg by mouth three (3) times daily. Indications: repeated episodes of anxiety      hydrOXYzine pamoate (VISTARIL) 50 mg capsule Take 50 mg by mouth every eight (8) hours as needed for Anxiety.  loratadine (CLARITIN) 10 mg tablet Take 10 mg by mouth daily. Indications: inflammation of the nose due to an allergy      QUEtiapine (SEROquel) 300 mg tablet Take 300 mg by mouth nightly. Indications: schizophrenia      QUEtiapine (SEROquel) 50 mg tablet Take 50 mg by mouth daily as needed (psychosis).  melatonin 5 mg cap capsule Take 5 mg by mouth nightly. Indications: insomnia      ARIPiprazole (ABILIFY) 20 mg tablet Take 20 mg by mouth nightly. Indications: schizophrenia      sertraline (ZOLOFT) 50 mg tablet Take 1 Tab by mouth daily.  (Patient taking differently: Take 50 mg by mouth daily. Indications: repeated episodes of anxiety, depression) 30 Tab 0       Past History     Past Medical History:  Past Medical History:   Diagnosis Date    Asperger syndrome     Schizophrenia ([address]     Scoliosis        Past Surgical History:  Past Surgical History:   Procedure Laterality Date    HX CIRCUMCISION  1995    HX HERNIA REPAIR      as an infant     HX HERNIA REPAIR Bilateral 09/1995       Family History:  Family History   Problem Relation Age of Onset    No Known Problems Mother     No Known Problems Father        Social History:  Social History     Tobacco Use    Smoking status: Never Smoker    Smokeless tobacco: Never Used   Substance Use Topics    Alcohol use: Never     Frequency: Never    Drug use: Never       Allergies: Allergies   Allergen Reactions    Aspirin Hives         Review of Systems   Review of Systems   Constitutional: Negative for chills and fever. Respiratory: Negative for stridor. Cardiovascular: Negative for chest pain. Musculoskeletal: Negative. Skin: Negative. Allergic/Immunologic: Negative for immunocompromised state. Neurological: Negative for speech difficulty and weakness. Psychiatric/Behavioral: Positive for hallucinations and suicidal ideas. All other systems reviewed and are negative. Physical Exam     Vitals:    01/17/21 1231 01/17/21 2100 01/18/21 0652   BP: (!) 129/56 128/72 118/82   Pulse: 96 81 76   Resp: 16 18 18   Temp: 97.9 °F (36.6 °C) 98 °F (36.7 °C) 98 °F (36.7 °C)   SpO2: 98% 98% 98%   Weight: 71.7 kg (158 lb)     Height: 6' 3\" (1.905 m)       Physical Exam  Vitals signs and nursing note reviewed. Constitutional:       General: He is not in acute distress. Appearance: He is well-developed. HENT:      Head: Normocephalic and atraumatic. Mouth/Throat:      Pharynx: No oropharyngeal exudate.    Eyes:      Conjunctiva/sclera: Conjunctivae normal.   Cardiovascular:      Rate and Rhythm: Normal rate and regular rhythm. Heart sounds: Normal heart sounds. Pulmonary:      Effort: Pulmonary effort is normal. No respiratory distress. Breath sounds: Normal breath sounds. No wheezing or rales. Abdominal:      General: Bowel sounds are normal. There is no distension. Palpations: Abdomen is soft. Tenderness: There is no abdominal tenderness. There is no guarding or rebound. Musculoskeletal: Normal range of motion. Skin:     General: Skin is warm and dry. Neurological:      Mental Status: He is alert and oriented to person, place, and time. Psychiatric:         Attention and Perception: He perceives auditory hallucinations. Speech: Speech normal.         Behavior: Behavior is cooperative. Thought Content: Thought content includes suicidal ideation. Cognition and Memory: Cognition and memory normal.           Diagnostic Study Results     Labs -     No results found for this or any previous visit (from the past 12 hour(s)). Radiologic Studies -   No orders to display     CT Results  (Last 48 hours)    None        CXR Results  (Last 48 hours)    None            Medical Decision Making   I am the first provider for this patient. I reviewed the vital signs, available nursing notes, past medical history, past surgical history, family history and social history. Vital Signs-Reviewed the patient's vital signs. Records Reviewed: Nursing Notes and Old Medical Records    ED Course as of Jan 18 0939   [facility] is here to evaluate patient    [AH]   1418 I spoke with Lakeshia Mai from [facility] who states they will admit patient but he will likely be a hold in the ER as there are no beds available.    [AH]   1626 Per [address] may have a pending discharge in which case patient would be able to get admitted. However they would like a CBC and CMP in the meantime.    [AH]   [address] turned over to colleague Anthony Rivera NP for disposition of pt.     [AH] 2011 Patient has been admitted to [facility] behavioral health. Accepting physician Dr. Khadra Gu. Patient will be transferred to behavioral health at 7 AM 1/18/2021. [AN]   Mon Jan 18, 2021   7829 No acute events overnight during my shift. Patient was calm, no complaints. Awaiting bed, which will hopefully be available this am around 7 based on last report. [TZ]      ED Course User Index  [AH] Bebe Connelly PA-C  [AN] Sarah Padilla NP  [TZ] Ivonne Hopkins MD          Disposition:  Admitted       Provider Notes (Medical Decision Making):   Patient presents for mental health evaluation stating he is having auditory hallucinations commanding him to shoot himself. Will get basic labs and await BSmart evaluation for best disposition. DDX: Depression, schizophrenia, homelessness      Procedures:  Procedures    Please note that this dictation was completed with Dragon, computer voice recognition software. Quite often unanticipated grammatical, syntax, homophones, and other interpretive errors are inadvertently transcribed by the computer software. Please disregard these errors. Additionally, please excuse any errors that have escaped final proofreading. Diagnosis     Clinical Impression:   1. Other schizophrenia ([facility]    2.  Autism spectrum

## 2021-01-17 NOTE — ED TRIAGE NOTES
Reports hearing voices telling him to harm himself, they are commanding and he had psychosis. Also reports he is autistic. He is suicidal.  Voices telling him to shoot himself. Does not have a gun but voices telling him to get one. Also reports being homeless. States plan is to get him into group home and wants to know if we can help make that happen.

## 2021-01-17 NOTE — BSMART NOTE
Comprehensive Assessment Form Part 1 Section I - Disposition Axis I - Schizophrenia, Autism Spectrum Disorder Axis II - None Axis III - Scoliosis Axis IV - Homelessness, No access to medication [place] - 35 The Medical Doctor to Psychiatrist conference was not completed. The Medical Doctor is in agreement with Psychiatrist disposition because of (reason) patient is requesting voluntary admission. The plan is admit to [facility]. The on-call Psychiatrist consulted was MARISA Pabon. 
The admitting Psychiatrist will be Dr. Viviane Crarera. The admitting Diagnosis is Schizophrenia. The Payor source is [employer]. Section II - Integrated Summary Summary:  Patient is a 22year old male seen face to face in the ER. He came to the ER reporting he is hearing voices telling him to shoot himself. He does not have a gun but his voices are telling him to get one. Patient reported his mother kicked him out of their home yesterday and won't let him back in. He does not have his medications with him as they are at his mother's house. He denied homicidal ideation. He was last hospitalized at [facility] in October 2020. He is open to case management services through [facility]. He also receives therapy weekly with Enriuqeta Florian at [address] [place] [address]. He is requesting voluntary admission. The patient has demonstrated mental capacity to provide informed consent. The information is given by the patient and past medical records. The Chief Complaint is mental health problem. The Precipitant Factors are kicked out of home yesterday by mother, no access to his home medications. Previous Hospitalizations: yes The patient has not previously been in restraints. Current Psychiatrist and  is Dan[facility]. Lethality Assessment: 
 
The potential for suicide is noted by the following: active psychosis, defined plan and ideation. The potential for homicide is not noted. The patient has not been a perpetrator of sexual or physical abuse. There are not pending charges. The patient is felt to be at risk for self harm or harm to others. The attending nurse was advised the patient needs supervision. Section III - Psychosocial 
The patient's overall mood and attitude is withdrawn. Feelings of helplessness and hopelessness are not observed. Generalized anxiety is not observed. Panic is not observed. Phobias are not observed. Obsessive compulsive tendencies are not observed. Section IV - Mental Status Exam 
The patient's appearance shows no evidence of impairment. The patient's behavior shows no evidence of impairment. The patient is oriented to time, place, person and situation. The patient's speech is soft. The patient's mood is withdrawn. The range of affect is constricted. The patient's thought content demonstrates no evidence of impairment. The thought process shows no evidence of impairment. The patient's perception demonstrated changes in the following:  auditory hallucinations. The patient's memory shows no evidence of impairment. The patient's appetite shows no evidence of impairment. The patient's sleep has evidence of insomnia. The patient's insight is blaming. The patient's judgement is psychologically impaired. Section V - Substance Abuse The patient is not using substances. Section VI - Living Arrangements The patient is single. The patient is homeless. The patient has no children. The patient does not plan to return home upon discharge. The patient does not have legal issues pending. The patient's source of income comes from social security. Pentecostalism and cultural practices have not been voiced at this time. The patient's greatest support comes from [facility] and this person will be involved with the treatment. The patient has not been in an event described as horrible or outside the realm of ordinary life experience either currently or in the past. 
The patient has not been a victim of sexual/physical abuse. Section VII - Other Areas of Clinical Concern The highest grade achieved is not assessed with the overall quality of school experience being described as unknown. The patient is currently disabled and speaks [place] as a primary language. The patient has no communication impairments affecting communication. The patient's preference for learning can be described as: can read and write adequately.   The patient's hearing is normal.  The patient's vision is normal. 
 
 
Ariela Kramer MA

## 2021-01-17 NOTE — ED NOTES
Emergency Department Nursing Plan of Care       The Nursing Plan of Care is developed from the Nursing assessment and Emergency Department Attending provider initial evaluation. The plan of care may be reviewed in the ED Provider note.     The Plan of Care was developed with the following considerations:   Patient / Family readiness to learn indicated by:verbalized understanding  Persons(s) to be included in education: patient  Barriers to Learning/Limitations:No    Signed     Spring Arceo RN    1/17/2021   12:58 PM

## 2021-01-18 LAB
SARS-COV-2, COV2: NOT DETECTED
SPECIMEN SOURCE, FCOV2M: NORMAL

## 2021-01-18 PROCEDURE — 65220000003 HC RM SEMIPRIVATE PSYCH

## 2021-01-18 RX ORDER — BENZTROPINE MESYLATE 1 MG/1
1 TABLET ORAL 2 TIMES DAILY
Status: ON HOLD | COMMUNITY
End: 2021-01-26 | Stop reason: SDUPTHER

## 2021-01-18 RX ORDER — HYDROXYZINE 25 MG/1
50 TABLET, FILM COATED ORAL
Status: DISCONTINUED | OUTPATIENT
Start: 2021-01-18 | End: 2021-01-26 | Stop reason: HOSPADM

## 2021-01-18 RX ORDER — BENZTROPINE MESYLATE 1 MG/1
1 TABLET ORAL
Status: DISCONTINUED | OUTPATIENT
Start: 2021-01-18 | End: 2021-01-26 | Stop reason: HOSPADM

## 2021-01-18 RX ORDER — HALOPERIDOL 5 MG/ML
5 INJECTION INTRAMUSCULAR
Status: DISCONTINUED | OUTPATIENT
Start: 2021-01-18 | End: 2021-01-26 | Stop reason: HOSPADM

## 2021-01-18 RX ORDER — LORAZEPAM 2 MG/ML
1 INJECTION INTRAMUSCULAR
Status: DISCONTINUED | OUTPATIENT
Start: 2021-01-18 | End: 2021-01-26 | Stop reason: HOSPADM

## 2021-01-18 RX ORDER — QUETIAPINE FUMARATE 50 MG/1
50 TABLET, FILM COATED ORAL
Status: ON HOLD | COMMUNITY
End: 2021-01-26 | Stop reason: SDUPTHER

## 2021-01-18 RX ORDER — LORATADINE 10 MG/1
10 TABLET ORAL DAILY
Status: ON HOLD | COMMUNITY
End: 2021-01-26 | Stop reason: SDUPTHER

## 2021-01-18 RX ORDER — QUETIAPINE FUMARATE 300 MG/1
300 TABLET, FILM COATED ORAL
Status: ON HOLD | COMMUNITY
End: 2021-01-26 | Stop reason: SDUPTHER

## 2021-01-18 RX ORDER — TRAZODONE HYDROCHLORIDE 50 MG/1
50 TABLET ORAL
Status: DISCONTINUED | OUTPATIENT
Start: 2021-01-18 | End: 2021-01-26 | Stop reason: HOSPADM

## 2021-01-18 RX ORDER — ADHESIVE BANDAGE
30 BANDAGE TOPICAL DAILY PRN
Status: DISCONTINUED | OUTPATIENT
Start: 2021-01-18 | End: 2021-01-26 | Stop reason: HOSPADM

## 2021-01-18 RX ORDER — HYDROXYZINE PAMOATE 50 MG/1
50 CAPSULE ORAL
Status: ON HOLD | COMMUNITY
End: 2021-01-26 | Stop reason: SDUPTHER

## 2021-01-18 RX ORDER — CHOLECALCIFEROL (VITAMIN D3) 125 MCG
CAPSULE ORAL
COMMUNITY
End: 2021-01-18

## 2021-01-18 RX ORDER — MELATONIN 5 MG
5 CAPSULE ORAL
Status: ON HOLD | COMMUNITY
End: 2021-01-26 | Stop reason: SDUPTHER

## 2021-01-18 RX ORDER — ARIPIPRAZOLE 10 MG/1
10 TABLET ORAL DAILY
Status: ON HOLD | COMMUNITY
End: 2021-01-26 | Stop reason: SDUPTHER

## 2021-01-18 RX ORDER — DIPHENHYDRAMINE HYDROCHLORIDE 50 MG/ML
50 INJECTION, SOLUTION INTRAMUSCULAR; INTRAVENOUS
Status: DISCONTINUED | OUTPATIENT
Start: 2021-01-18 | End: 2021-01-26 | Stop reason: HOSPADM

## 2021-01-18 RX ORDER — ACETAMINOPHEN 325 MG/1
650 TABLET ORAL
Status: DISCONTINUED | OUTPATIENT
Start: 2021-01-18 | End: 2021-01-26 | Stop reason: HOSPADM

## 2021-01-18 RX ORDER — BUSPIRONE HYDROCHLORIDE 10 MG/1
10 TABLET ORAL 3 TIMES DAILY
Status: ON HOLD | COMMUNITY
End: 2021-01-26 | Stop reason: SDUPTHER

## 2021-01-18 RX ORDER — OLANZAPINE 5 MG/1
5 TABLET ORAL
Status: DISCONTINUED | OUTPATIENT
Start: 2021-01-18 | End: 2021-01-26 | Stop reason: HOSPADM

## 2021-01-18 NOTE — ED NOTES
Pt alert and oriented, in no acute distress, resting in bed with bed in low position and wheels locked, call bell in reach. Pt is looking on his phone, apparently at social media. Pt's belongings were placed into 3 bags with his label on the outside and placed in the patient belongings cabinet on the bottom shelf. Pt has his phone, tablet and  at the bedside.

## 2021-01-18 NOTE — ED NOTES
Bedside and Verbal shift change report given to [facility] (oncoming nurse) by Ana Long (offgoing nurse). Report included the following information SBAR, Kardex, ED Summary, [facility] and Recent Results.

## 2021-01-18 NOTE — GROUP NOTE
STEPH  GROUP DOCUMENTATION INDIVIDUAL Group Therapy Note Date: 1/18/2021 Group Start Time: 1500 Group End Time: 8544 Group Topic: Recreational/Music Therapy [address] [facility] [address] ACUTE BEHAV [facility], [address] GROUP DOCUMENTATION GROUP Group Therapy Note Attendees: 6 Attendance: Did not attend Patient's Goal: Interventions/techniques Rodolfo Montero

## 2021-01-18 NOTE — ED NOTES
Patient resting In bed comfortably and in no acute distress. Will continue to monitor. None available

## 2021-01-18 NOTE — BH NOTES
Pt admitted today from [facility] ED. voluntary for treatment. Pt came in with A/H telling him to harm self. Per report pt states he will buy a gun to kill self. Pt is living with his mother and they had a disagreement. Per report pt was aggressive with mother. Pt was put out of mothers home. Pt is calm and cooperative. Pt was recently at [facility] 3/2019. Pt states he has been complaint with medications. Allergies to aspirin. UDS and BAl negative. PT denies si/hi/v oakes. On admission. States he does not hear voices at this time but if they come back he will let staff know. Pt is alert and oriented. States he feels anxious. Skin intact. Old scars. Medical Hx of Autism, scoliosis. COVID PCR negative. pt ate lunch. In room resting at this time.

## 2021-01-18 NOTE — ED NOTES
Salomón Sharp with [facility] relayed that patient will have bed in am but cannot go upstairs until after 0700 am per psych bedboard. This was also relayed to [facility] RN-Primary Nurse and Dr. Arun Carl. Patient is aware of this.

## 2021-01-18 NOTE — PROGRESS NOTES
[facility] Admission Pharmacy Medication Reconciliation     Information obtained from: RxQuery, patient interview  RxQuery data available1: Yes    Comments/recommendations:    1) Medication changes (since last review): Added  Aripiprazole 10 mg tablet  Quetiapine 50 mg tablet  Melatonin 5 mg capsule  Adjusted  Benztropine dose  Buspirone dose  Hydroxyzine dose  Quetiapine dosage form & frequency      1RxQuery pharmacy benefit data reflects medications filled and processed through the patient's insurance, however                this data does NOT capture whether the medication was picked up or is currently being taken by the patient. Total Time Spent: 15 minutes    Past Medical History/Disease States:  Past Medical History:   Diagnosis Date    Asperger syndrome     Schizophrenia ([facility] [address]     Scoliosis        Patient allergies: Allergies as of 01/17/2021 - Review Complete 01/17/2021   Allergen Reaction Noted    Aspirin Hives 04/09/2019       Prior to Admission Medications   Prescriptions Last Dose Informant Patient Reported? Taking? ARIPiprazole (ABILIFY) 10 mg tablet 1/17/2021 at AM Self Yes Yes   Sig: Take 10 mg by mouth daily. Indications: schizophrenia   ARIPiprazole (ABILIFY) 20 mg tablet 1/16/2021 at PM Self Yes Yes   Sig: Take 20 mg by mouth nightly. Indications: schizophrenia   QUEtiapine (SEROquel) 300 mg tablet 1/16/2021 at PM Self Yes Yes   Sig: Take 300 mg by mouth nightly. Indications: schizophrenia, insomnia   QUEtiapine (SEROquel) 50 mg tablet 12/18/2020 at Unknown time Self Yes Yes   Sig: Take 50 mg by mouth daily as needed (psychosis). benztropine (COGENTIN) 1 mg tablet 1/17/2021 at AM Self Yes Yes   Sig: Take 1 mg by mouth two (2) times a day. Indications: extrapyramidal symptoms as a result of taking the medication   busPIRone (BUSPAR) 10 mg tablet 1/17/2021 at AM Self Yes Yes   Sig: Take 10 mg by mouth three (3) times daily.  Indications: repeated episodes of anxiety   hydrOXYzine pamoate (VISTARIL) 50 mg capsule 1/17/2021 at Unknown time Self Yes Yes   Sig: Take 50 mg by mouth every eight (8) hours as needed for Anxiety. loratadine (CLARITIN) 10 mg tablet 1/17/2021 at AM Self Yes Yes   Sig: Take 10 mg by mouth daily. Indications: inflammation of the nose due to an allergy   melatonin 5 mg cap capsule 1/16/2021 at PM Self Yes Yes   Sig: Take 5 mg by mouth nightly. Indications: insomnia   sertraline (ZOLOFT) 50 mg tablet 1/17/2021 at AM Self No Yes   Sig: Take 1 Tab by mouth daily. Patient taking differently: Take 50 mg by mouth daily.  Indications: repeated episodes of anxiety, depression      Facility-Administered Medications: None        Thank you,  [address] [place] [address] Specialist, [address] [place] [address] Nw: [phone]  Pharmacy: [phone] ([phone])

## 2021-01-18 NOTE — BSMART NOTE
Patient has been accepted to [address] [place] [address] 310 Bed 1 by MARISA Burrell on behalf of Dr Jessi Espinal.

## 2021-01-18 NOTE — BH NOTES
GROUP THERAPY PROGRESS NOTE    Patient did not participate in Coping Skills group.      Ramya Love LPC LSATP [facility]

## 2021-01-18 NOTE — ED NOTES
Patient resting comfortably and in no acute distress, playing on phone. Patient provided another warm blanket.

## 2021-01-18 NOTE — ED NOTES
TRANSFER - OUT REPORT:    Verbal report given to Reg Ramon RN (name) on Jennifer Bianchi  being transferred to behavioral health (unit) for routine progression of care       Report consisted of patients Situation, Background, Assessment and   Recommendations(SBAR). Information from the following report(s) SBAR, ED Summary, MAR, Recent Results and Med Rec Status was reviewed with the receiving nurse. Lines:       Opportunity for questions and clarification was provided.       Patient transported with:  Annie Diza

## 2021-01-18 NOTE — GROUP NOTE
STEPH  GROUP DOCUMENTATION INDIVIDUAL Group Therapy Note Date: 1/18/2021 Group Start Time: 0900 Group End Time: 1000 Group Topic: Topic Group [address] [facility] [address] 3 ACUTE BEHAV [facility], [address] [place], Susan. \Hospital Has a New Name and Outlook.\"" GROUP DOCUMENTATION GROUP Group Therapy Note Attendees: 5 Attendance: Did not attend Patient's Goal: Interventions/techniques: 
Miriam Sheth

## 2021-01-18 NOTE — GROUP NOTE
STEPH  GROUP DOCUMENTATION INDIVIDUAL Group Therapy Note Date: 1/18/2021 Group Start Time: 1100 Group End Time: 1200 Group Topic: Topic Group [facility] 3 ACUTE BEHAV [facility], [address] [facility] GROUP DOCUMENTATION GROUP Group Therapy Note Attendees: 4 Attendance: Did not attend Patient's Goal: Interventions/techniques Praveena Jackson

## 2021-01-18 NOTE — PROGRESS NOTES
Problem: Falls - Risk of  Goal: *Absence of Falls  Description: Document Ariana Fuller Fall Risk and appropriate interventions in the flowsheet.   Outcome: Progressing Towards Goal  Note: Fall Risk Interventions:            Medication Interventions: Teach patient to arise slowly                   Problem: Patient Education: Go to Patient Education Activity  Goal: Patient/Family Education  Outcome: Progressing Towards Goal     Problem: Anxiety  Goal: *Alleviation of anxiety  Outcome: Progressing Towards Goal  Goal: *Alleviation of anxiety (Palliative Care)  Outcome: Progressing Towards Goal

## 2021-01-19 ENCOUNTER — TELEPHONE (OUTPATIENT)
Dept: NEUROLOGY | Age: 26
End: 2021-01-19

## 2021-01-19 LAB
CHOLEST SERPL-MCNC: 115 MG/DL
GLUCOSE P FAST SERPL-MCNC: 81 MG/DL (ref 65–100)
HDLC SERPL-MCNC: 56 MG/DL
HDLC SERPL: 2.1 {RATIO} (ref 0–5)
LDLC SERPL CALC-MCNC: 49.6 MG/DL (ref 0–100)
LIPID PROFILE,FLP: NORMAL
TRIGL SERPL-MCNC: 47 MG/DL (ref ?–150)
TSH SERPL DL<=0.05 MIU/L-ACNC: 0.79 UIU/ML (ref 0.36–3.74)
VLDLC SERPL CALC-MCNC: 9.4 MG/DL

## 2021-01-19 PROCEDURE — 74011250637 HC RX REV CODE- 250/637: Performed by: PSYCHIATRY & NEUROLOGY

## 2021-01-19 PROCEDURE — 36415 COLL VENOUS BLD VENIPUNCTURE: CPT

## 2021-01-19 PROCEDURE — 65220000003 HC RM SEMIPRIVATE PSYCH

## 2021-01-19 PROCEDURE — 80061 LIPID PANEL: CPT

## 2021-01-19 PROCEDURE — 82947 ASSAY GLUCOSE BLOOD QUANT: CPT

## 2021-01-19 PROCEDURE — 84443 ASSAY THYROID STIM HORMONE: CPT

## 2021-01-19 RX ORDER — LORATADINE 10 MG/1
10 TABLET ORAL DAILY
Status: DISCONTINUED | OUTPATIENT
Start: 2021-01-19 | End: 2021-01-26 | Stop reason: HOSPADM

## 2021-01-19 RX ORDER — LANOLIN ALCOHOL/MO/W.PET/CERES
4.5 CREAM (GRAM) TOPICAL
Status: DISCONTINUED | OUTPATIENT
Start: 2021-01-19 | End: 2021-01-26 | Stop reason: HOSPADM

## 2021-01-19 RX ORDER — ARIPIPRAZOLE 5 MG/1
10 TABLET ORAL DAILY
Status: DISCONTINUED | OUTPATIENT
Start: 2021-01-20 | End: 2021-01-26 | Stop reason: HOSPADM

## 2021-01-19 RX ORDER — SERTRALINE HYDROCHLORIDE 50 MG/1
50 TABLET, FILM COATED ORAL DAILY
Status: DISCONTINUED | OUTPATIENT
Start: 2021-01-19 | End: 2021-01-26 | Stop reason: HOSPADM

## 2021-01-19 RX ORDER — BENZTROPINE MESYLATE 1 MG/1
1 TABLET ORAL 2 TIMES DAILY
Status: DISCONTINUED | OUTPATIENT
Start: 2021-01-19 | End: 2021-01-26 | Stop reason: HOSPADM

## 2021-01-19 RX ORDER — BUSPIRONE HYDROCHLORIDE 10 MG/1
10 TABLET ORAL 3 TIMES DAILY
Status: DISCONTINUED | OUTPATIENT
Start: 2021-01-19 | End: 2021-01-26 | Stop reason: HOSPADM

## 2021-01-19 RX ADMIN — BUSPIRONE HYDROCHLORIDE 10 MG: 10 TABLET ORAL at 14:17

## 2021-01-19 RX ADMIN — Medication 4.5 MG: at 21:22

## 2021-01-19 RX ADMIN — LORATADINE 10 MG: 10 TABLET ORAL at 14:17

## 2021-01-19 RX ADMIN — BUSPIRONE HYDROCHLORIDE 10 MG: 10 TABLET ORAL at 17:35

## 2021-01-19 RX ADMIN — QUETIAPINE FUMARATE 300 MG: 200 TABLET ORAL at 21:23

## 2021-01-19 RX ADMIN — BENZTROPINE MESYLATE 1 MG: 1 TABLET ORAL at 17:35

## 2021-01-19 RX ADMIN — ARIPIPRAZOLE 20 MG: 15 TABLET ORAL at 21:23

## 2021-01-19 RX ADMIN — SERTRALINE HYDROCHLORIDE 50 MG: 50 TABLET ORAL at 14:17

## 2021-01-19 NOTE — GROUP NOTE
STEPH  GROUP DOCUMENTATION INDIVIDUAL Group Therapy Note Date: 1/19/2021 Group Start Time: 0900 Group End Time: 1000 Group Topic: Topic Group [address] 3 ACUTE BEHAV [facility], [address] GROUP DOCUMENTATION GROUP Group Therapy Note Attendees: 4 Attendance: Did not attend Patient's Goal: Interventions/techniques: 
Henrico Sportsman

## 2021-01-19 NOTE — PROGRESS NOTES
Problem: Discharge Planning  Goal: *Discharge to safe environment  Outcome: Progressing Towards Goal  Note: Patient is unable to return home. Patient will be placed in group home. Goal: *Knowledge of medication management  Outcome: Progressing Towards Goal  Note: Patient verbalizes understanding of medication regimen. Patient is taking medications as prescribed. Goal: *Knowledge of discharge instructions  Outcome: Progressing Towards Goal  Note: Patient verbalizes understanding of goals for treatment and safe discharge.

## 2021-01-19 NOTE — BH NOTES
GROUP THERAPY PROGRESS NOTE    Patient did not participate in Coping Skills group.      Mateusz Carnes LPC LSATP [facility]

## 2021-01-19 NOTE — PROGRESS NOTES
Patient rested 7 hours during the night. No distress noted. Will continue to monitor. Labs drawn and sent to the lab.

## 2021-01-19 NOTE — BH NOTES
PSYCHOSOCIAL ASSESSMENT  :Patient identifying info:  Jason Granger is a 22 y.o., male admitted 1/17/2021 12:33 PM     Presenting problem and precipitating factors: Patient reports he was hearing voices that were telling him to harm himself by shooting (reports he does not have a gun). He states he was kicked out of his mother's home without his medicine. Mental status assessment: patient presented with anxious demeanor (moving from side to side, walking back into room and then coming back to the door), soft speech, appropriate eye contact, cooperative attitude, anxious mood, logical thought process, appropriate content. Patient denies SI/HI/AVH at this time. Strengths: service-connected, supportive mother at times    Collateral information: RAFAL for mother, Lala Sheehan ([phone]    Current psychiatric /substance abuse providers and contact info: Danitza SHAH ([facility] [phone]), [facility] John Barrow)    Previous psychiatric/substance abuse providers and response to treatment: Alex Parr in 10/2020    Family history of mental illness or substance abuse: none reported    Substance abuse history:  Patient denies; UDS-, BAL<10  Social History     Tobacco Use    Smoking status: Never Smoker    Smokeless tobacco: Never Used   Substance Use Topics    Alcohol use: Never     Frequency: Never       History of biomedical complications associated with substance abuse : none reported    Patient's current acceptance of treatment or motivation for change: voluntary    Family constellation: patient identifies as single and has no children    Is significant other involved? N/A      Describe support system: mother is primary support as well as service providers    Describe living arrangements and home environment: patient was residing with mother but reports she kicked him out prior to admission. He hopes to move to a group home.  His CM reports there have been efforts to have him placed in [facility] but mother pushes back with complaints about the homes.      Health issues: denied  Hospital Problems  Date Reviewed: 2021          Codes Class Noted POA    * (Principal) Schizophrenia ([facility] [address] ICD-10-CM: F20.9  ICD-9-CM: 295.90  2021 Unknown        Autism ICD-10-CM: F84.0  ICD-9-CM: 299.00  4/10/2019 Yes              Trauma history: none reported    Legal issues: none reported    History of  service: none reported    Financial status: disabled    Baptism/cultural factors: none reported    Education/work history: not employed at this time    Have you been licensed as a health care professional (current or ): no    Leisure and recreation preferences: none reported    Describe coping skills: limited and ineffectual    Huy Walker  2021

## 2021-01-19 NOTE — PROGRESS NOTES
Problem: Falls - Risk of  Goal: *Absence of Falls  Description: Document Alissa Kapil Fall Risk and appropriate interventions in the flowsheet. Outcome: Progressing Towards Goal  Note: Fall Risk Interventions:            Medication Interventions: Teach patient to arise slowly                   Problem: Anxiety  Goal: *Alleviation of anxiety  Outcome: Progressing Towards Goal  Goal: *Alleviation of anxiety (Palliative Care)  Outcome: Progressing Towards Goal     Problem: Suicide  Goal: *STG: Remains safe in hospital  Outcome: Progressing Towards Goal  Goal: *STG: Seeks staff when feelings of self harm or harm towards others arise  Outcome: Progressing Towards Goal  Goal: *STG: Attends activities and groups  Outcome: Progressing Towards Goal  Goal: *STG:  Verbalizes alternative ways of dealing with maladaptive feelings/behaviors  Outcome: Progressing Towards Goal  Goal: *STG/LTG: Complies with medication therapy  Outcome: Progressing Towards Goal  Goal: *STG/LTG: No longer expresses self destructive or suicidal thoughts  Outcome: Progressing Towards Goal  Goal: *LTG:  Identifies available community resources  Outcome: Progressing Towards Goal  Goal: *LTG:  Develops proactive suicide prevention plan  Outcome: Progressing Towards Goal  Goal: Interventions  Outcome: Progressing Towards Goal    Patient alert and oriented to person and place. He has been withdrawn to himself this evening. Patient denied depression, anxiety, SI/HI, AVH, and pain at this time. He appears to be thought blocking and responding to internal stimuli. No ordered PM medications. No PRNs given, vital signs stable and Q15 minute checks continue to maintain safety. RN will continue to monitor.

## 2021-01-19 NOTE — PROGRESS NOTES
Laboratory monitoring for mood stabilizer and antipsychotics:    Recommended baseline monitoring has been completed based on this patient's current medication regimen. The patient is currently taking the following medication(s):   Current Facility-Administered Medications   Medication Dose Route Frequency    [START ON 1/20/2021] ARIPiprazole (ABILIFY) tablet 10 mg  10 mg Oral DAILY    ARIPiprazole (ABILIFY) tablet 20 mg  20 mg Oral QHS    benztropine (COGENTIN) tablet 1 mg  1 mg Oral BID    busPIRone (BUSPAR) tablet 10 mg  10 mg Oral TID    loratadine (CLARITIN) tablet 10 mg  10 mg Oral DAILY    melatonin tablet 4.5 mg  4.5 mg Oral QHS    QUEtiapine (SEROquel) tablet 300 mg  300 mg Oral QHS    sertraline (ZOLOFT) tablet 50 mg  50 mg Oral DAILY       Height, Weight, BMI Estimation  Estimated body mass index is 19.75 kg/m² as calculated from the following:    Height as of this encounter: 190.5 cm (75\"). Weight as of this encounter: 71.7 kg (158 lb). Renal Function, Hepatic Function and Chemistry  Estimated Creatinine Clearance: 75.3 mL/min (A) (by C-G formula based on SCr of 1.52 mg/dL (H)). Lab Results   Component Value Date/Time    Sodium 142 01/17/2021 01:07 PM    Potassium 5.1 01/17/2021 01:07 PM    Chloride 106 01/17/2021 01:07 PM    CO2 30 01/17/2021 01:07 PM    Anion gap 6 01/17/2021 01:07 PM    Glucose 81 01/19/2021 05:30 AM    BUN 13 01/17/2021 01:07 PM    Creatinine 1.52 (H) 01/17/2021 01:07 PM    BUN/Creatinine ratio 9 (L) 01/17/2021 01:07 PM    GFR est AA >60 01/17/2021 01:07 PM    GFR est non-AA 56 (L) 01/17/2021 01:07 PM    Calcium 8.9 01/17/2021 01:07 PM    ALT (SGPT) 23 01/17/2021 01:07 PM    Alk.  phosphatase 112 01/17/2021 01:07 PM    Protein, total 7.4 01/17/2021 01:07 PM    Albumin 4.1 01/17/2021 01:07 PM    Globulin 3.3 01/17/2021 01:07 PM    A-G Ratio 1.2 01/17/2021 01:07 PM    Bilirubin, total 0.5 01/17/2021 01:07 PM       Lab Results   Component Value Date/Time    Glucose 81 01/19/2021 05:30 AM       Lab Results   Component Value Date/Time    Hemoglobin A1c 5.3 03/05/2020 11:52 AM       Hematology  Lab Results   Component Value Date/Time    WBC 4.2 01/17/2021 01:07 PM    HGB 13.4 01/17/2021 01:07 PM    HCT 41.4 01/17/2021 01:07 PM    PLATELET 744 26/16/5814 01:07 PM    MCV 84.5 01/17/2021 01:07 PM       Lipids  Lab Results   Component Value Date/Time    Cholesterol, total 115 01/19/2021 05:30 AM    HDL Cholesterol 56 01/19/2021 05:30 AM    LDL, calculated 49.6 01/19/2021 05:30 AM    Triglyceride 47 01/19/2021 05:30 AM    CHOL/HDL Ratio 2.1 01/19/2021 05:30 AM       Thyroid Function    Lab Results   Component Value Date/Time    TSH 0.79 01/19/2021 05:30 AM     Vitals  Visit Vitals  /76 (BP 1 Location: Left arm, BP Patient Position: Standing)   Pulse 82   Temp 97.6 °F (36.4 °C)   Resp 18   Ht 190.5 cm (75\")   Wt 71.7 kg (158 lb)   SpO2 99%   BMI 19.75 kg/m²       Malena José, PharmD, BCPS  [phone] (pharmacy)

## 2021-01-19 NOTE — BH NOTES
GROUP THERAPY PROGRESS NOTE    Patient did not participate in Substance abuse group.      Andria Chavez LPC LSATP [facility]

## 2021-01-19 NOTE — GROUP NOTE
STEPH  GROUP DOCUMENTATION INDIVIDUAL Group Therapy Note Date: 1/19/2021 Group Start Time: 1100 Group End Time: 1200 Group Topic: Topic Group [facility] 3 ACUTE BEHAV [facility] [place], [address] [facility] GROUP DOCUMENTATION GROUP Group Therapy Note Attendees: 6 Attendance: Did not attend Patient's Goal: Interventions/techniques:Radha Richardson

## 2021-01-19 NOTE — BH NOTES
0700-Report received from [facility].  0800-1000-Patient in no acute distress at this time,  denies pain or discomfort at this time, he denies auditory or visual hallucination, he denies SI/HI,  He is ambulating in the  [place] talking  On the phone at this time, staff will continue to monitor. 1000-1200-Patient in no acute distress, he has been pleasant and cooperatives, no safety or behavior issuers note through out the AM.  1200-1400-Patient was started on medication  That was given this shift , he tolerated the medication without  Observable adverse  Side effect at this time. Staff will continue to monitor. 1400-1600-Patient in his room resting at this time, no safety or behavior issues noted at this time.   1600-1800-Patient has been calm and cooperative , no safety or behavior issues noted at this time,

## 2021-01-19 NOTE — GROUP NOTE
STEPH  GROUP DOCUMENTATION INDIVIDUAL Group Therapy Note Date: 1/19/2021 Group Start Time: 1500 Group End Time: 2058 Group Topic: Recreational/Music Therapy [facility] [place] [address] ACUTE BEHAV [facility] [place], [address] GROUP DOCUMENTATION GROUP Group Therapy Note Attendees: 5 Attendance: Did not attend Patient's Goal: Interventions/techniques:Radha Richardson

## 2021-01-19 NOTE — PROGRESS NOTES
Problem: Falls - Risk of  Goal: *Absence of Falls  Description: Document Jennie Kilgore Fall Risk and appropriate interventions in the flowsheet.   Outcome: Progressing Towards Goal  Note: Fall Risk Interventions:            Medication Interventions: Teach patient to arise slowly

## 2021-01-19 NOTE — BH NOTES
met with pt for face to face individual counseling session. Pt presented with clear but soft speech, logical thought process, anxious mood, cooperative attitude. Pt stated he is feeling \"good but bored. \" Pt stated he spent most of the day resting and writing in his journal. Pt processed why journaling is a positive coping skill for him, pt had proper insight into this.  offered to provide journal prompts to pt, he declined. Pt processed possible discharge plans, feels that group home is the best option at this time. Pt previously lived in [place] and a group home run by a Black male named Jaimefidencio Mocarley, pt unable to remember name of group home. Pt stated he had bad experience with both group homes and declines consent for us to contact them for future placement or social history. Pt asking treatment team to contact therapist [facility] at [phone] to coordinate on group home d/c plans. Pt states ST WHITTINGTON is awaiting approval to [facility] [place] in [address]. Pt processed emotions regarding hospitalization and events leading up to hospitalization. Pt processed feelings about treatment provided in hospital. Pt processed goals for future.     ANA Sullivan

## 2021-01-20 PROCEDURE — 74011250637 HC RX REV CODE- 250/637: Performed by: PSYCHIATRY & NEUROLOGY

## 2021-01-20 PROCEDURE — 65220000003 HC RM SEMIPRIVATE PSYCH

## 2021-01-20 PROCEDURE — 93005 ELECTROCARDIOGRAM TRACING: CPT

## 2021-01-20 RX ADMIN — BUSPIRONE HYDROCHLORIDE 10 MG: 10 TABLET ORAL at 12:12

## 2021-01-20 RX ADMIN — BENZTROPINE MESYLATE 1 MG: 1 TABLET ORAL at 08:12

## 2021-01-20 RX ADMIN — QUETIAPINE FUMARATE 300 MG: 200 TABLET ORAL at 21:49

## 2021-01-20 RX ADMIN — BENZTROPINE MESYLATE 1 MG: 1 TABLET ORAL at 17:14

## 2021-01-20 RX ADMIN — Medication 4.5 MG: at 21:50

## 2021-01-20 RX ADMIN — BUSPIRONE HYDROCHLORIDE 10 MG: 10 TABLET ORAL at 17:14

## 2021-01-20 RX ADMIN — BUSPIRONE HYDROCHLORIDE 10 MG: 10 TABLET ORAL at 08:12

## 2021-01-20 RX ADMIN — SERTRALINE HYDROCHLORIDE 50 MG: 50 TABLET ORAL at 08:12

## 2021-01-20 RX ADMIN — LORATADINE 10 MG: 10 TABLET ORAL at 08:12

## 2021-01-20 RX ADMIN — ARIPIPRAZOLE 20 MG: 15 TABLET ORAL at 21:50

## 2021-01-20 RX ADMIN — ARIPIPRAZOLE 10 MG: 5 TABLET ORAL at 08:11

## 2021-01-20 NOTE — BH NOTES
Behavioral Health Treatment Team Note     Patient goal(s) for today: continue taking meds as prescribe, engage in unit activities, participate in hygiene/ADLs, prepare for discharge, follow directions from staff, contact support team  Treatment team focus/goals: continue adjusting meds as needed, discharge planning, update support system    Progress note: Patient presents with appropriate eye contact, cooperative attitude, constricted affect, anxious mood, logical thought process. He reports he is sleeping okay and has a good appetite. He indicated he has spoken to his mother and those talks are going well. He reports good mood, no SI/HI and denied any auditory hallucinations since he arrived. Patient reports his medications are okay and is tolerating them well. He does not wish providers to contact Crisis Development or Continued Care facilities. He says his CM, Mona Carpio, is working on getting him into [facility]. SW attempted to contact Anita and left a voicemail requesting a return call. Patient reports other facilities he would consider are [facility] ([phone], Shawn Gonzalez), [facility] ([phone], Jason Red), or [facility] ([phone], Luz Marina Hess). Patient has not attended groups and was encouraged to do so. RENZO spoke with Sarah Luke at [facility] who said that Monakody Carpio with [facility] is not involved in securing housing. Key[facility] indicated that [facility] has availability and to fax [facility] there ([phone]).      LOS:  3  Expected LOS: 5-7    Insurance info/prescription coverage:  [facility]   Date of last family contact:  RAFAL for motherJovanni ([phone])  Family requesting physician contact today:  no  Discharge plan:  Return to home or snf placement  Guns in the home:  no   Outpatient provider(s):  Danitza SHAH (HARVEY Subramanian [phone], 1/19/21), [facility] (Davis Master [phone])    Participating treatment team members: Melissa Jacobsen Bhanu Bradley MSW, Dr. Luba Hawkins MD
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
## 2021-01-20 NOTE — H&P
[facility] HISTORY AND PHYSICAL    Name:  Rachelle Feng  MR#:  [id number]  :  1995  ACCOUNT #:  [de-identified]  ADMIT DATE:  2021    PSYCHIATRIC INTAKE NOTE    DATE OF SERVICE:  2021    CHIEF COMPLAINT:  \"I have schizophrenia, and I was hearing voices telling me to kill myself. \"    HISTORY OF PRESENT ILLNESS:  This is a 42-year-old male with Asperger syndrome, schizophrenia or schizoaffective disorder, who comes to the hospital stating he is hearing voices telling him to shoot himself. He says he did not want to do that, and he is afraid of the voices. He came in to get help. He says he is taking medications and maybe just adjustment might help put things back in order. He is in the process of getting to a new group home with his outpatient providers, and being homeless might have sparked the stressor that exacerbated his condition, and he is here for management of his condition. PAST PSYCHIATRIC HISTORY:  Asperger syndrome or autism spectrum disorder, schizoaffective disorder. He knows his medications and has been taking Abilify, Cogentin, Seroquel, melatonin and Zoloft. He meditates to help manage his situation as well. PAST MEDICAL HISTORY:  Denies. ALLERGIES:  TO ASPIRIN, CAUSES RASH. SOCIAL HISTORY:  He is single, never . No children. Homeless. Unemployed. Denies alcohol, drugs or cigarettes. MENTAL STATUS EXAM:  Adult male, calm, cooperative. Clear and coherent speech of average rate, volume and tone. Some stereotypic pacing behaviors during conversation. Repetitive movements that seem slightly awkward with conversation, but coherent and clear. No aggression or violence. Appropriately interactive with some slight uneasiness with conversation. Denies suicidal or homicidal ideations currently. No auditory or visual hallucinations. Here for management of his condition.     DIAGNOSES:  Schizoaffective disorder, depressive type; autism spectrum disorder. PLAN:  Admit for safety and stabilization, medication modification as needed, group therapy, individual therapy. ESTIMATED LENGTH OF STAY:  5-7 days. DISPOSITION:  Planning with Social Work. STRENGTHS:  Willingness for treatment. DISABILITIES:  Limited supports. IBAN Batres MD      [id number]  D:  01/19/2021 11:53  T:  01/19/2021 19:18  JOB #:  [id number]

## 2021-01-20 NOTE — BH NOTES
Psychiatric Progress Note    Patient: Liudmila Jones MRN: [id number]  SSN: [id number]    YOB: 1995  Age: 22 y.o. Sex: male      Admit Date: 1/17/2021    LOS: 3 days     Subjective:     Liudmila Jones  reports feeling good and moods are good. Denies SI/HI/AH/VH. No aggression or violence. Appropriately interactive and aware. Tolerating medications well. Eating well and sleeping well. Proactive in his disposition planning. Tends to isolate to room.     Objective:     Vitals:    01/18/21 1930 01/19/21 0900 01/19/21 1949 01/20/21 0738   BP: 128/74 117/76 122/66 100/66   Pulse: 77 82 76 78   Resp: 18 18 18 18   Temp: 97.7 °F (36.5 °C) 97.6 °F (36.4 °C) 97.6 °F (36.4 °C) 97.6 °F (36.4 °C)   SpO2: 98% 99% 100% 100%   Weight:       Height:            Mental Status Exam:   Sensorium  oriented to time, place and person   Relations cooperative   Eye Contact appropriate   Appearance:  age appropriate   Speech:  normal volume and non-pressured   Thought Process: goal directed   Thought Content free of delusions and hallucinations   Suicidal ideations none   Mood:  euthymic   Affect:  full range and odd demeanor   Memory   adequate   Concentration:  adequate   Insight:  good   Judgment:  good       MEDICATIONS:  Current Facility-Administered Medications   Medication Dose Route Frequency    ARIPiprazole (ABILIFY) tablet 10 mg  10 mg Oral DAILY    ARIPiprazole (ABILIFY) tablet 20 mg  20 mg Oral QHS    benztropine (COGENTIN) tablet 1 mg  1 mg Oral BID    busPIRone (BUSPAR) tablet 10 mg  10 mg Oral TID    loratadine (CLARITIN) tablet 10 mg  10 mg Oral DAILY    melatonin tablet 4.5 mg  4.5 mg Oral QHS    QUEtiapine (SEROquel) tablet 300 mg  300 mg Oral QHS    sertraline (ZOLOFT) tablet 50 mg  50 mg Oral DAILY    OLANZapine (ZyPREXA) tablet 5 mg  5 mg Oral Q6H PRN    haloperidol lactate (HALDOL) injection 5 mg  5 mg IntraMUSCular Q6H PRN    benztropine (COGENTIN) tablet 1 mg  1 mg Oral BID PRN  diphenhydrAMINE (BENADRYL) injection 50 mg  50 mg IntraMUSCular BID PRN    hydrOXYzine HCL (ATARAX) tablet 50 mg  50 mg Oral TID PRN    LORazepam (ATIVAN) injection 1 mg  1 mg IntraMUSCular Q4H PRN    traZODone (DESYREL) tablet 50 mg  50 mg Oral QHS PRN    acetaminophen (TYLENOL) tablet 650 mg  650 mg Oral Q4H PRN    magnesium hydroxide (MILK OF MAGNESIA) 400 mg/5 mL oral suspension 30 mL  30 mL Oral DAILY PRN      DISCUSSION:   the risks and benefits of the proposed medication  patient given opportunity to ask questions    Lab/Data Review: All lab results for the last 24 hours reviewed. No results found for this or any previous visit (from the past 24 hour(s)). Assessment:     Principal Problem:    Schizophrenia ([facility] [address] (1/17/2021)    Active Problems:    Autism (4/10/2019)        Plan:     Continue current care  Collateral information  EKG baseline due to taking 2 antipsychotics  Disposition planning with social work    I certify that this patient's inpatient psychiatric hospital services furnished since the previous certification were, and continue to be, required for treatment that could reasonably be expected to improve the patient's condition, or for diagnostic study, and that the patient continues to need, on a daily basis, active treatment furnished directly by or requiring the supervision of inpatient psychiatric facility personnel. In addition, the hospital records show that services furnished were intensive treatment services, admission or related services, or equivalent services.   Signed By: Jax Petersen MD     January 20, 2021

## 2021-01-20 NOTE — GROUP NOTE
STEPH  GROUP DOCUMENTATION INDIVIDUAL Group Therapy Note Date: 1/20/2021 Group Start Time: 1400 Group End Time: 1500 Group Topic: Recreational/Music Therapy [address] [facility], [place] [address] [facility] GROUP DOCUMENTATION GROUP Group Therapy Note Attendees: 4 Attendance: Did not attend Patient's Goal: Interventions/techniques Jay Jay Sigala

## 2021-01-20 NOTE — BH NOTES
GROUP THERAPY PROGRESS NOTE    Patient did not participate in Coping Skills group.      Дмитрий Santos LPC LSATP [facility]AC

## 2021-01-20 NOTE — BH NOTES
0700-Report  received from  Sue Matthews. [id number] in no acute distress at this time,  denies pain or discomfort at this time, he denies auditory or visual hallucination, he denies SI/HI,  He is ambulating in the  [place] talking  On the phone at this time, staff will continue to monitor. 1000-1200-Patient in no acute distress, he has been pleasant and cooperatives, no safety or behavior issuers note through out the AM.  1200-1400-Patient   In no acute distress  He had EKG  done this shift no safety issues noted  And he tolerated the procedure with minimal difficulties   [id number]- he  Is in the hallway talking on the phone at this time, no safety or behaviors issues noted staff will continue to monitor.   1600-1800-Patient  Had good shift,  Safety issues noted at this time he denies  pain or discomfort he has been medication and meal compliant

## 2021-01-20 NOTE — PROGRESS NOTES
Problem: Falls - Risk of  Goal: *Absence of Falls  Description: Document Jennie Kilgore Fall Risk and appropriate interventions in the flowsheet.   1/20/2021 0039 by Jenniffer Garner RN  Outcome: Progressing Towards Goal  Note: Fall Risk Interventions:      Medication Interventions: Teach patient to arise slowly     1/20/2021 0039 by Jenniffer Garner RN  Outcome: Progressing Towards Goal  Note: Fall Risk Interventions:     Medication Interventions: Teach patient to arise slowly      Problem: Suicide  Goal: *STG: Remains safe in hospital  Outcome: Progressing Towards Goal

## 2021-01-20 NOTE — BH NOTES
GROUP THERAPY PROGRESS NOTE    Patient did not participate in Discharge Planning Group.      Kala Park LPC LSATP CSAC

## 2021-01-20 NOTE — PROGRESS NOTES
1930-Assumed care of pt. Pt alert, oriented, resting in bed and does not appear to be in any acute distress. Pt denies SI/HI, hallucinations, anxiety, or depression at this time. Medication compliant and no behavior issues will continue to monitor. Pt slept for a total of 7 hours. 0700-Report given to oncoming nurse.

## 2021-01-20 NOTE — GROUP NOTE
STEPH  GROUP DOCUMENTATION INDIVIDUAL Group Therapy Note Date: 1/20/2021 Group Start Time: 1000 Group End Time: 1100 Group Topic: Topic Group [address] ACUTE BEHAV [facility] [place], [address] Levine, Susan. \Hospital Has a New Name and Outlook.\"" GROUP DOCUMENTATION GROUP Group Therapy Note Attendees: 5 Attendance: Did not attend Patient's Goal: Interventions/techniquesRadha Richardson

## 2021-01-20 NOTE — BH NOTES
GROUP THERAPY PROGRESS NOTE    Patient did not participate in Coping Skills group.      Connie De Leon LPC LSATP CSAC

## 2021-01-21 LAB
ATRIAL RATE: 64 BPM
CALCULATED P AXIS, ECG09: 83 DEGREES
CALCULATED R AXIS, ECG10: 87 DEGREES
CALCULATED T AXIS, ECG11: 72 DEGREES
DIAGNOSIS, 93000: NORMAL
P-R INTERVAL, ECG05: 152 MS
Q-T INTERVAL, ECG07: 376 MS
QRS DURATION, ECG06: 86 MS
QTC CALCULATION (BEZET), ECG08: 387 MS
VENTRICULAR RATE, ECG03: 64 BPM

## 2021-01-21 PROCEDURE — 65220000003 HC RM SEMIPRIVATE PSYCH

## 2021-01-21 PROCEDURE — 74011250637 HC RX REV CODE- 250/637: Performed by: PSYCHIATRY & NEUROLOGY

## 2021-01-21 RX ADMIN — LORATADINE 10 MG: 10 TABLET ORAL at 08:16

## 2021-01-21 RX ADMIN — QUETIAPINE FUMARATE 300 MG: 200 TABLET ORAL at 21:19

## 2021-01-21 RX ADMIN — Medication 4.5 MG: at 21:19

## 2021-01-21 RX ADMIN — BUSPIRONE HYDROCHLORIDE 10 MG: 10 TABLET ORAL at 17:27

## 2021-01-21 RX ADMIN — BENZTROPINE MESYLATE 1 MG: 1 TABLET ORAL at 17:27

## 2021-01-21 RX ADMIN — BUSPIRONE HYDROCHLORIDE 10 MG: 10 TABLET ORAL at 12:23

## 2021-01-21 RX ADMIN — BUSPIRONE HYDROCHLORIDE 10 MG: 10 TABLET ORAL at 08:16

## 2021-01-21 RX ADMIN — SERTRALINE HYDROCHLORIDE 50 MG: 50 TABLET ORAL at 08:16

## 2021-01-21 RX ADMIN — ARIPIPRAZOLE 20 MG: 15 TABLET ORAL at 21:20

## 2021-01-21 RX ADMIN — BENZTROPINE MESYLATE 1 MG: 1 TABLET ORAL at 08:16

## 2021-01-21 RX ADMIN — ARIPIPRAZOLE 10 MG: 5 TABLET ORAL at 08:16

## 2021-01-21 NOTE — GROUP NOTE
[facility] GROUP DOCUMENTATION INDIVIDUAL Group Therapy Note Date: 1/21/2021 Group Start Time: 4899 Group End Time: 1000 Group Topic: Social Work Group [address] BEHAVIORAL HLTH Beola Solo [facility] GROUP DOCUMENTATION GROUP Group Therapy Note Attendees: 1 Attendance: Did not attend Patient's Goal: Interventions/techniques: Follows Directions:  
 
Interactions:  
 
Mental Status:  
 
Behavior/appearance:  
 
Goals Achieved: Additional Notes:   
 
Chiara Woo

## 2021-01-21 NOTE — GROUP NOTE
STEPH  GROUP DOCUMENTATION INDIVIDUAL Group Therapy Note Date: 1/21/2021 Group Start Time: 1400 Group End Time: 1500 Group Topic: Recreational/Music Therapy [facility] [place] [address] ACUTE BEHAV [facility] [place], [address] GROUP DOCUMENTATION GROUP Group Therapy Note Attendees: 6 Attendance: Attended Patient's Goal:  To concentrate on selected task Interventions/techniques: Supported-crafts,games,music Follows Directions:  
 
Interactions: minimal 
 
Mental Status: Calm and Preoccupied Behavior/appearance: Needed prompting Goals Achieved: Additional Notes:  Pleasant upon approach-declined active participation -left session early Korey Leavitt

## 2021-01-21 NOTE — GROUP NOTE
STEPH  GROUP DOCUMENTATION INDIVIDUAL Group Therapy Note Date: 1/21/2021 Group Start Time: 1000 Group End Time: 1100 Group Topic: Topic Group [facility] 3 ACUTE BEHAV [facility] [place], 300 [facility] GROUP DOCUMENTATION GROUP Group Therapy Note Attendees: 3 Attendance: Did not attend Patient's Goal: Interventions/techniques Cheryle Silva

## 2021-01-21 NOTE — GROUP NOTE
STEPH  GROUP DOCUMENTATION INDIVIDUAL Group Therapy Note Date: 1/21/2021 Group Start Time: 4206 Group End Time: 8120 Group Topic: Social Work Group [address] BEHAVIORAL HLTH Dinah Dominguez [facility] GROUP DOCUMENTATION GROUP Group Therapy Note Attendees: 2 Attendance: Did not attend Patient's Goal: Interventions/techniques: Follows Directions:  
 
Interactions:  
 
Mental Status:  
 
Behavior/appearance:  
 
Goals Achieved: Additional Notes:   
 
Caroline Crum

## 2021-01-21 NOTE — PROGRESS NOTES
Problem: Falls - Risk of  Goal: *Absence of Falls  Description: Document Green Salvia Fall Risk and appropriate interventions in the flowsheet.   Outcome: Progressing Towards Goal  Note: Fall Risk Interventions:     Medication Interventions: Teach patient to arise slowly     Problem: Suicide  Goal: *STG: Remains safe in hospital  Outcome: Progressing Towards Goal     Problem: Discharge Planning  Goal: *Discharge to safe environment  Outcome: Progressing Towards Goal

## 2021-01-21 NOTE — PROGRESS NOTES
1930-Assumed care of pt. Pt alert, oriented, ambulating in hallway talking on phone and does not appear to be in any acute distress. Pt denied everything, medication compliant, and  no prn administered will continue to monitor. Pt slept for a total of 7 hours. 0700-Report given to oncoming nurse.

## 2021-01-21 NOTE — BH NOTES
Behavioral Health Treatment Team Note      Patient goal(s) for today: continue taking meds as prescribe, engage in unit activities, participate in hygiene/ADLs, prepare for discharge, follow directions from staff, contact support team  Treatment team focus/goals: continue adjusting meds as needed, discharge planning, update support system     Progress note: Patient moved to general unit but attempted to elope and was transferred back to room on acute. When asked about this, patient reported he was used to this current room and had not wanted to move. He reports no SI/HI/AVH. He presented with constricted affect, appropriate eye contact, cooperative attitude. RENZO noted efforts to secure [facility] placement. RENZO faxed [facility] to [facility] and consulted with Kvng Gonzalez who said she would consider patient for placement. She later called RENZO back and reported that, after speaking with someone at [address] [place] [address] [employer], that she will not be able to accept him due to behavior issues. RENZO contacted Johnny.  was not aware of efforts to secure placement by [facility] or [facility]. She has male beds available and is willing to accept [facility] to review tomorrow.  RENZO faxed to [phone], phone [phone].     LOS:  4                       Expected LOS: TBD     Insurance info/prescription coverage:  [facility]   Date of last family contact:  RAFAL for motherDavid ([phone])  Family requesting physician contact today:  no  Discharge plan:  [facility] placement  Guns in the home:  no   Outpatient provider(s):  [facility] (CM [place] Sousa [phone], 1/20/21), [facility] (Roddy Crouch [phone], attempted 1/20/21)     Participating treatment team members: Ai Pate, Chrystal Vences MSW, Dr. Joyce Ruggiero MD

## 2021-01-21 NOTE — BH NOTES
0700-Report received from  [facility]. [id number]- Patient in no acute distress this time, he was moved over to general but attempted to  Elope  from the unit and was redirected and moved back to acute at this time, he has since been calm and cooperative, denies pain or discomfort. , he continues to deny SI/HI or AVH at this time he is medication an meal compliant   1000-1200-patient in no acute distress he ate lunch and is now in his room  Resting at this time. 1200-1400-Patient in no acute distress  He is resting in his room  At this time, no safety issues noted.   1400-1600-Patient in no acute distress at this time   No safety issues  Noted at this time staff will continue to monitor   1600-1800-Patient in no acute distress no safety issues noted  At this time

## 2021-01-21 NOTE — BH NOTES
Psychiatric Progress Note    Patient: Amelia Marcos MRN: [id number]  SSN: [id number]    YOB: 1995  Age: 22 y.o. Sex: male      Admit Date: 1/17/2021    LOS: 4 days     Subjective:     Amelia Marcos  reports feeling good and moods are good. Denies SI/HI/AH/VH. No aggression or violence. Appropriately interactive and aware. Tolerating medications well. Eating well and sleeping well. Proactive in his disposition planning. Tends to isolate to room. 1/21 - Amelia Marcos reports feeling well and moods are good. Denies SI/HI/AH/VH. Got upset and discombobulated when his room was changed. Was unable to tolerate this change. Put back on the acute unit and settled down. No aggression or violence. Appropriately interactive and aware. Tolerating medications well. Eating and sleeping fairly.       Objective:     Vitals:    01/19/21 1949 01/20/21 0738 01/20/21 1957 01/21/21 0751   BP: 122/66 100/66 (!) 117/96 (!) 97/56   Pulse: 76 78 82 69   Resp: 18 18 20 16   Temp: 97.6 °F (36.4 °C) 97.6 °F (36.4 °C) 98.1 °F (36.7 °C) 97.2 °F (36.2 °C)   SpO2: 100% 100% 100% 100%   Weight:       Height:            Mental Status Exam:   Sensorium  oriented to time, place and person   Relations cooperative   Eye Contact appropriate   Appearance:  age appropriate   Speech:  normal volume and non-pressured   Thought Process: goal directed   Thought Content free of delusions and hallucinations   Suicidal ideations none   Mood:  euthymic   Affect:  full range and odd demeanor   Memory   adequate   Concentration:  adequate   Insight:  good   Judgment:  good       MEDICATIONS:  Current Facility-Administered Medications   Medication Dose Route Frequency    ARIPiprazole (ABILIFY) tablet 10 mg  10 mg Oral DAILY    ARIPiprazole (ABILIFY) tablet 20 mg  20 mg Oral QHS    benztropine (COGENTIN) tablet 1 mg  1 mg Oral BID    busPIRone (BUSPAR) tablet 10 mg  10 mg Oral TID    loratadine (CLARITIN) tablet 10 mg 10 mg Oral DAILY    melatonin tablet 4.5 mg  4.5 mg Oral QHS    QUEtiapine (SEROquel) tablet 300 mg  300 mg Oral QHS    sertraline (ZOLOFT) tablet 50 mg  50 mg Oral DAILY    OLANZapine (ZyPREXA) tablet 5 mg  5 mg Oral Q6H PRN    haloperidol lactate (HALDOL) injection 5 mg  5 mg IntraMUSCular Q6H PRN    benztropine (COGENTIN) tablet 1 mg  1 mg Oral BID PRN    diphenhydrAMINE (BENADRYL) injection 50 mg  50 mg IntraMUSCular BID PRN    hydrOXYzine HCL (ATARAX) tablet 50 mg  50 mg Oral TID PRN    LORazepam (ATIVAN) injection 1 mg  1 mg IntraMUSCular Q4H PRN    traZODone (DESYREL) tablet 50 mg  50 mg Oral QHS PRN    acetaminophen (TYLENOL) tablet 650 mg  650 mg Oral Q4H PRN    magnesium hydroxide (MILK OF MAGNESIA) 400 mg/5 mL oral suspension 30 mL  30 mL Oral DAILY PRN      DISCUSSION:   the risks and benefits of the proposed medication  patient given opportunity to ask questions    Lab/Data Review: All lab results for the last 24 hours reviewed. No results found for this or any previous visit (from the past 24 hour(s)). EKG = Normal sinus rhythm with sinus arrhythmia. No previous ECGs available   Confirmed by Donnie Herrera M.D., [facility] ([id number]) on 1/21/2021 7:51:56 AM     Assessment:     Principal Problem:    Schizophrenia ([facility] [address] (1/17/2021)    Active Problems:    Autism (4/10/2019)        Plan:     Continue current care  Collateral information  Disposition planning with social work    I certify that this patient's inpatient psychiatric hospital services furnished since the previous certification were, and continue to be, required for treatment that could reasonably be expected to improve the patient's condition, or for diagnostic study, and that the patient continues to need, on a daily basis, active treatment furnished directly by or requiring the supervision of inpatient psychiatric facility personnel.  In addition, the hospital records show that services furnished were intensive treatment services, admission or related services, or equivalent services.   Signed By: Kathe Chan MD     January 21, 2021

## 2021-01-22 PROCEDURE — 65220000003 HC RM SEMIPRIVATE PSYCH

## 2021-01-22 PROCEDURE — 74011250637 HC RX REV CODE- 250/637: Performed by: PSYCHIATRY & NEUROLOGY

## 2021-01-22 RX ADMIN — BUSPIRONE HYDROCHLORIDE 10 MG: 10 TABLET ORAL at 12:49

## 2021-01-22 RX ADMIN — QUETIAPINE FUMARATE 300 MG: 200 TABLET ORAL at 21:05

## 2021-01-22 RX ADMIN — SERTRALINE HYDROCHLORIDE 50 MG: 50 TABLET ORAL at 08:39

## 2021-01-22 RX ADMIN — BUSPIRONE HYDROCHLORIDE 10 MG: 10 TABLET ORAL at 17:21

## 2021-01-22 RX ADMIN — LORATADINE 10 MG: 10 TABLET ORAL at 08:39

## 2021-01-22 RX ADMIN — BENZTROPINE MESYLATE 1 MG: 1 TABLET ORAL at 17:21

## 2021-01-22 RX ADMIN — ARIPIPRAZOLE 20 MG: 15 TABLET ORAL at 21:06

## 2021-01-22 RX ADMIN — BENZTROPINE MESYLATE 1 MG: 1 TABLET ORAL at 08:38

## 2021-01-22 RX ADMIN — ARIPIPRAZOLE 10 MG: 5 TABLET ORAL at 08:38

## 2021-01-22 RX ADMIN — Medication 4.5 MG: at 20:38

## 2021-01-22 RX ADMIN — BUSPIRONE HYDROCHLORIDE 10 MG: 10 TABLET ORAL at 08:39

## 2021-01-22 NOTE — GROUP NOTE
IP  GROUP DOCUMENTATION INDIVIDUAL Group Therapy Note Date: 1/22/2021 Group Start Time: 1000 Group End Time: 9262 Group Topic: Social Work Group [facility] Dony Zepeda [facility] GROUP DOCUMENTATION GROUP Group Therapy Note Attendees: 5 Attendance: Did not attend Patient's Goal: Interventions/techniques: Follows Directions:  
 
Interactions:  
 
Mental Status:  
 
Behavior/appearance:  
 
Goals Achieved: Additional Notes:   
 
Danny Carver

## 2021-01-22 NOTE — BH NOTES
Assumed care of patient from [address] [place] [address]. Patient alert, verbal and oriented x4. Patient was calm and cooperative during assessment. Patient endorses extreme anxiety and depression related to his living situation (homeless), feeling lonely and not knowing where he will go upon discharge. Patient was med and meal compliant. Patient attended groups and interacted well with staff and peers. Patient participated during treatment team. No acute behaviors. No PRN's given. Q15 minute checks continued for safety.     Problem: Suicide  Goal: *STG: Remains safe in hospital  Outcome: Progressing Towards Goal  Goal: *STG/LTG: Complies with medication therapy  Outcome: Progressing Towards Goal  Goal: *STG/LTG: No longer expresses self destructive or suicidal thoughts  Outcome: Progressing Towards Goal

## 2021-01-22 NOTE — PROGRESS NOTES
1930-Assumed care of pt. Pt alert, oriented, ambulating in hallway talking on phone and does not appear to be in any acute distress. Pt denied everything, medication compliant, and  no prn administered will continue to monitor. Pt slept a total of 7 hours.     0700-Report given to oncoming nurse.

## 2021-01-22 NOTE — PROGRESS NOTES
Problem: Falls - Risk of  Goal: *Absence of Falls  Description: Document Doris Swenson Fall Risk and appropriate interventions in the flowsheet.   1/21/2021 2344 by Leonardo Robertson RN  Outcome: Progressing Towards Goal  Note: Fall Risk Interventions:      Medication Interventions: Teach patient to arise slowly   1/21/2021 2304 by Leonardo Robertson RN  Outcome: Progressing Towards Goal  Note: Fall Risk Interventions:     Medication Interventions: Teach patient to arise slowly       Problem: Patient Education: Go to Patient Education Activity  Goal: Patient/Family Education  Outcome: Progressing Towards Goal     Problem: Discharge Planning  Goal: *Discharge to safe environment  Outcome: Progressing Towards Goal

## 2021-01-22 NOTE — BH NOTES
Psychiatric Progress Note    Patient: Delano Cleary MRN: [id number]  SSN: [id number]    YOB: 1995  Age: 22 y.o. Sex: male      Admit Date: 1/17/2021    LOS: 5 days     Subjective:     Delano Cleary  reports feeling good and moods are good. Denies SI/HI/AH/VH. No aggression or violence. Appropriately interactive and aware. Tolerating medications well. Eating well and sleeping well. Proactive in his disposition planning. Tends to isolate to room. 1/21 - Delano Cleary reports feeling well and moods are good. Denies SI/HI/AH/VH. Got upset and discombobulated when his room was changed. Was unable to tolerate this change. Put back on the acute unit and settled down. No aggression or violence. Appropriately interactive and aware. Tolerating medications well. Eating and sleeping fairly. 1/22 - Delano Cleary reports feeling well and moods are good. Apologized for his outburst yesterday when his room was changed. He is comfortable in his current space and prefers to isolate there. Not the social type. Denies SI/HI/AH/VH. No aggression or violence. Appropriately interactive and aware. Tolerating medications well. Eating and sleeping fairly. Discharge focused.       Objective:     Vitals:    01/20/21 1957 01/21/21 0751 01/21/21 2001 01/22/21 0827   BP: (!) 117/96 (!) 97/56 111/66 119/74   Pulse: 82 69 93 86   Resp: 20 16 18 18   Temp: 98.1 °F (36.7 °C) 97.2 °F (36.2 °C) 98.2 °F (36.8 °C) 97.4 °F (36.3 °C)   SpO2: 100% 100% 100% 100%   Weight:       Height:            Mental Status Exam:   Sensorium  oriented to time, place and person   Relations cooperative   Eye Contact appropriate   Appearance:  age appropriate   Speech:  normal volume and non-pressured   Thought Process: goal directed   Thought Content free of delusions and hallucinations   Suicidal ideations none   Mood:  euthymic   Affect:  full range and odd demeanor   Memory   adequate   Concentration: adequate   Insight:  good   Judgment:  good       MEDICATIONS:  Current Facility-Administered Medications   Medication Dose Route Frequency    ARIPiprazole (ABILIFY) tablet 10 mg  10 mg Oral DAILY    ARIPiprazole (ABILIFY) tablet 20 mg  20 mg Oral QHS    benztropine (COGENTIN) tablet 1 mg  1 mg Oral BID    busPIRone (BUSPAR) tablet 10 mg  10 mg Oral TID    loratadine (CLARITIN) tablet 10 mg  10 mg Oral DAILY    melatonin tablet 4.5 mg  4.5 mg Oral QHS    QUEtiapine (SEROquel) tablet 300 mg  300 mg Oral QHS    sertraline (ZOLOFT) tablet 50 mg  50 mg Oral DAILY    OLANZapine (ZyPREXA) tablet 5 mg  5 mg Oral Q6H PRN    haloperidol lactate (HALDOL) injection 5 mg  5 mg IntraMUSCular Q6H PRN    benztropine (COGENTIN) tablet 1 mg  1 mg Oral BID PRN    diphenhydrAMINE (BENADRYL) injection 50 mg  50 mg IntraMUSCular BID PRN    hydrOXYzine HCL (ATARAX) tablet 50 mg  50 mg Oral TID PRN    LORazepam (ATIVAN) injection 1 mg  1 mg IntraMUSCular Q4H PRN    traZODone (DESYREL) tablet 50 mg  50 mg Oral QHS PRN    acetaminophen (TYLENOL) tablet 650 mg  650 mg Oral Q4H PRN    magnesium hydroxide (MILK OF MAGNESIA) 400 mg/5 mL oral suspension 30 mL  30 mL Oral DAILY PRN      DISCUSSION:   the risks and benefits of the proposed medication  patient given opportunity to ask questions    Lab/Data Review: All lab results for the last 24 hours reviewed. No results found for this or any previous visit (from the past 24 hour(s)). EKG = Normal sinus rhythm with sinus arrhythmia.   No previous ECGs available   Confirmed by Faith Swain M.D., [facility] ([id number]) on 1/21/2021 7:51:56 AM     Assessment:     Principal Problem:    Schizophrenia ([facility] [address] (1/17/2021)    Active Problems:    Autism (4/10/2019)        Plan:     Continue current care  Collateral information  Disposition planning with social work    I certify that this patient's inpatient psychiatric hospital services furnished since the previous certification were, and continue to be, required for treatment that could reasonably be expected to improve the patient's condition, or for diagnostic study, and that the patient continues to need, on a daily basis, active treatment furnished directly by or requiring the supervision of inpatient psychiatric facility personnel. In addition, the hospital records show that services furnished were intensive treatment services, admission or related services, or equivalent services.   Signed By: Barry Bustos MD     January 22, 2021

## 2021-01-22 NOTE — GROUP NOTE
STEPH  GROUP DOCUMENTATION INDIVIDUAL Group Therapy Note Date: 1/22/2021 Group Start Time: 1100 Group End Time: 1200 Group Topic: Topic Group [facility] 3 ACUTE BEHAV [facility] [place], [address] [facility] GROUP DOCUMENTATION GROUP Group Therapy Note Attendees: 5 Attendance: Did not attend Patient's Goal: Interventions/techniques: 
Delmar Hudson

## 2021-01-22 NOTE — BH NOTES
Behavioral Health Treatment Team Note      Patient goal(s) for today: continue taking meds as prescribe, engage in unit activities, participate in hygiene/ADLs, prepare for discharge, follow directions from staff, contact support team  Treatment team focus/goals: continue adjusting meds as needed, discharge planning, update support system     Progress note:  Patient presented with cooperative attitude, constricted affect, anxious mood, goal-directed thought stream. Patient apologized for attempting to elope yesterday, stating he was moved to the other unit and then got bad news about [facility]ACY. Per chart review, patient has had no further issues with peers, staff, medications. He reports he's been speaking with his mom and that is going well. He is sleeping well and eating well. Patient continues to isolate, stating that he prefers to keep to himself as he has trust issues and worries that people won't understand him. RENZO notified by [facility] that patient would be accepted. DMAS 96, physical, TB screen, COVID test, med list needed. Jack Guevara does not prorate rent so if he moved in prior to 2/1, he would still owe $1420. She would still be willing to accept if he went to crisis stabilization until 2/1. SW notified patient who said he would be willing to go to crisis stabilization program if needed until 2/1/21. SW contacted patient's mother, Mrs. Fede Henry. Medications reviewed, treatment discussed. SW discussed [facility]. Patient's mother in agreement to placement in crisis stabilization program until admission to [facility] on 2/1/21. No one has heard from counselaltagracia Campos. Mother noted history of [facility] placement and funds not being distributed properly, patient's recent anger outbursts. Medications are essentially the same in hospital as those prescribed on outpatient basis.  SW will update patient's mother on discharge planning next week.        LOS:  5                       Expected LOS: TBD     Insurance info/prescription coverage:  [employer] [facility] [address]  Date of last family contact:  RAFAL for mother, Tanya Beckwith ([phone], last attempt 2/22/21)  Family requesting physician contact today:  no  Discharge plan:  [facility] placement  Guns in the home:  no   Outpatient provider(s):  Danitza SHAH (HARVEY Bailey [phone], 1/20/21), [facility] (Marine Chavez, attempted 1/20/21)     Participating treatment team ANA Meehan, Dr. Lore Chávez MD

## 2021-01-22 NOTE — GROUP NOTE
STEPH  GROUP DOCUMENTATION INDIVIDUAL Group Therapy Note Date: 1/22/2021 Group Start Time: 0900 Group End Time: 1000 Group Topic: Topic Group [facility] [place] 3 ACUTE BEHAV [facility] [place], [address] GROUP DOCUMENTATION GROUP Group Therapy Note Attendees: 6 Attendance: Did not attend Patient's Goal: Interventions/techniquesRadha Richardson

## 2021-01-22 NOTE — PROGRESS NOTES
Problem: Falls - Risk of  Goal: *Absence of Falls  Description: Document Hosea Birch Fall Risk and appropriate interventions in the flowsheet.   Outcome: Progressing Towards Goal  Note: Fall Risk Interventions:     Medication Interventions: Teach patient to arise slowly    Problem: Discharge Planning  Goal: *Discharge to safe environment  Outcome: Progressing Towards Goal

## 2021-01-23 PROCEDURE — 65220000003 HC RM SEMIPRIVATE PSYCH

## 2021-01-23 PROCEDURE — 74011250637 HC RX REV CODE- 250/637: Performed by: PSYCHIATRY & NEUROLOGY

## 2021-01-23 RX ADMIN — ARIPIPRAZOLE 10 MG: 5 TABLET ORAL at 08:54

## 2021-01-23 RX ADMIN — QUETIAPINE FUMARATE 300 MG: 200 TABLET ORAL at 21:23

## 2021-01-23 RX ADMIN — ARIPIPRAZOLE 20 MG: 15 TABLET ORAL at 21:23

## 2021-01-23 RX ADMIN — BENZTROPINE MESYLATE 1 MG: 1 TABLET ORAL at 17:26

## 2021-01-23 RX ADMIN — Medication 4.5 MG: at 20:19

## 2021-01-23 RX ADMIN — BENZTROPINE MESYLATE 1 MG: 1 TABLET ORAL at 08:54

## 2021-01-23 RX ADMIN — BUSPIRONE HYDROCHLORIDE 10 MG: 10 TABLET ORAL at 12:25

## 2021-01-23 RX ADMIN — BUSPIRONE HYDROCHLORIDE 10 MG: 10 TABLET ORAL at 08:54

## 2021-01-23 RX ADMIN — LORATADINE 10 MG: 10 TABLET ORAL at 08:54

## 2021-01-23 RX ADMIN — BUSPIRONE HYDROCHLORIDE 10 MG: 10 TABLET ORAL at 17:26

## 2021-01-23 RX ADMIN — SERTRALINE HYDROCHLORIDE 50 MG: 50 TABLET ORAL at 08:54

## 2021-01-23 NOTE — BH NOTES
Psychiatric Progress Note    Patient: Loretta Herrera MRN: [id number]  SSN: [id number]    YOB: 1995  Age: 22 y.o. Sex: male      Admit Date: 1/17/2021    LOS: 6 days     Subjective:     Loretta Herrera  reports feeling good and moods are good. Denies SI/HI/AH/VH. No aggression or violence. Appropriately interactive and aware. Tolerating medications well. Eating well and sleeping well. Proactive in his disposition planning. Tends to isolate to room. 1/21 - Loretta Herrera reports feeling well and moods are good. Denies SI/HI/AH/VH. Got upset and discombobulated when his room was changed. Was unable to tolerate this change. Put back on the acute unit and settled down. No aggression or violence. Appropriately interactive and aware. Tolerating medications well. Eating and sleeping fairly. 1/22 - Loretta Herrera reports feeling well and moods are good. Apologized for his outburst yesterday when his room was changed. He is comfortable in his current space and prefers to isolate there. Not the social type. Denies SI/HI/AH/VH. No aggression or violence. Appropriately interactive and aware. Tolerating medications well. Eating and sleeping fairly. Discharge focused. 1/23 - Loretat Herrera reports feeling well overall. He denies any active suicidal or homicidal thinking. He is alert and pleasant on interview. Staff report no major behavioral disturbances in the interim. Slept eight hours.     Objective:     Vitals:    01/21/21 2001 01/22/21 0827 01/22/21 2014 01/23/21 0800   BP: 111/66 119/74 (!) 130/42 113/62   Pulse: 93 86 88 86   Resp: 18 18 16 16   Temp: 98.2 °F (36.8 °C) 97.4 °F (36.3 °C) 98 °F (36.7 °C) 98.2 °F (36.8 °C)   SpO2: 100% 100% 99% 99%   Weight:       Height:            Mental Status Exam:   Sensorium  oriented to time, place and person   Relations cooperative   Eye Contact appropriate   Appearance:  age appropriate   Speech:  normal volume and non-pressured   Thought Process: goal directed   Thought Content free of delusions and hallucinations   Suicidal ideations none   Mood:  euthymic   Affect:  full range and odd demeanor   Memory   adequate   Concentration:  adequate   Insight:  good   Judgment:  good       MEDICATIONS:  Current Facility-Administered Medications   Medication Dose Route Frequency    ARIPiprazole (ABILIFY) tablet 10 mg  10 mg Oral DAILY    ARIPiprazole (ABILIFY) tablet 20 mg  20 mg Oral QHS    benztropine (COGENTIN) tablet 1 mg  1 mg Oral BID    busPIRone (BUSPAR) tablet 10 mg  10 mg Oral TID    loratadine (CLARITIN) tablet 10 mg  10 mg Oral DAILY    melatonin tablet 4.5 mg  4.5 mg Oral QHS    QUEtiapine (SEROquel) tablet 300 mg  300 mg Oral QHS    sertraline (ZOLOFT) tablet 50 mg  50 mg Oral DAILY    OLANZapine (ZyPREXA) tablet 5 mg  5 mg Oral Q6H PRN    haloperidol lactate (HALDOL) injection 5 mg  5 mg IntraMUSCular Q6H PRN    benztropine (COGENTIN) tablet 1 mg  1 mg Oral BID PRN    diphenhydrAMINE (BENADRYL) injection 50 mg  50 mg IntraMUSCular BID PRN    hydrOXYzine HCL (ATARAX) tablet 50 mg  50 mg Oral TID PRN    LORazepam (ATIVAN) injection 1 mg  1 mg IntraMUSCular Q4H PRN    traZODone (DESYREL) tablet 50 mg  50 mg Oral QHS PRN    acetaminophen (TYLENOL) tablet 650 mg  650 mg Oral Q4H PRN    magnesium hydroxide (MILK OF MAGNESIA) 400 mg/5 mL oral suspension 30 mL  30 mL Oral DAILY PRN      DISCUSSION:   the risks and benefits of the proposed medication  patient given opportunity to ask questions    Lab/Data Review: All lab results for the last 24 hours reviewed. No results found for this or any previous visit (from the past 24 hour(s)). EKG = Normal sinus rhythm with sinus arrhythmia.   No previous ECGs available   Confirmed by Laurance Hatchet, M.D., [place] ([id number]) on 1/21/2021 7:51:56 AM     Assessment:     Principal Problem:    Schizophrenia ([facility] [address] (1/17/2021)    Active Problems:    Autism (4/10/2019)        Plan: Continue current care  Collateral information  Disposition planning with social work    I certify that this patient's inpatient psychiatric hospital services furnished since the previous certification were, and continue to be, required for treatment that could reasonably be expected to improve the patient's condition, or for diagnostic study, and that the patient continues to need, on a daily basis, active treatment furnished directly by or requiring the supervision of inpatient psychiatric facility personnel. In addition, the hospital records show that services furnished were intensive treatment services, admission or related services, or equivalent services.   Signed By: Bryanna Alejo MD     January 23, 2021

## 2021-01-23 NOTE — BH NOTES
Assumed care of patient from [address] [place] [address]. Patient denies feelings of anxiety, depression, SI, HI and AVH. Patient is calm, cooperative and pleasant. Patient seen visible and hallway and dayroom. Patient interacted well with staff and peers. Patient attended groups. Patient med and meal compliant. No PRN's given. Q15 minute checks continued for safety.      Problem: Anxiety  Goal: *Alleviation of anxiety  Outcome: Progressing Towards Goal     Problem: Suicide  Goal: *STG: Remains safe in hospital  Outcome: Progressing Towards Goal  Goal: *STG: Attends activities and groups  Outcome: Progressing Towards Goal  Goal: *STG/LTG: Complies with medication therapy  Outcome: Progressing Towards Goal

## 2021-01-23 NOTE — PROGRESS NOTES
Problem: Anxiety  Goal: *Alleviation of anxiety  Outcome: Progressing Towards Goal     Problem: Suicide  Goal: *STG: Remains safe in hospital  Outcome: Progressing Towards Goal  Goal: *STG/LTG: Complies with medication therapy  Outcome: Progressing Towards Goal   1945 rec'd pt awake, A&O. Pt is slightly anxious but pleasant and cooperative. Pt denies s/I, h/I and a/v hallucinations. Pt reports he had auditory hallucinations earlier, to kill himself or others. Pt is denying hearing voices at this time. He states that he was able to listen to music and is writing down his feelings. Pt also reports he's feeling better because he has a group home to go to after discharge. 2200 Pt remained visible on the unit. Observed in the milieu socializing with peers. No unsafe behavior noted or reported. Maintained on q15 min safety checks. 9543 Pt observed in bed eyes closed with even unlabored breathing. Continued to rest for long intervals with no signs of distress or discomfort. Maintained on q15 min safety checks.  Slept 8 hrs

## 2021-01-24 PROCEDURE — 65220000003 HC RM SEMIPRIVATE PSYCH

## 2021-01-24 PROCEDURE — 74011250637 HC RX REV CODE- 250/637: Performed by: PSYCHIATRY & NEUROLOGY

## 2021-01-24 RX ADMIN — BENZTROPINE MESYLATE 1 MG: 1 TABLET ORAL at 17:31

## 2021-01-24 RX ADMIN — QUETIAPINE FUMARATE 300 MG: 200 TABLET ORAL at 21:04

## 2021-01-24 RX ADMIN — LORATADINE 10 MG: 10 TABLET ORAL at 08:50

## 2021-01-24 RX ADMIN — BUSPIRONE HYDROCHLORIDE 10 MG: 10 TABLET ORAL at 13:23

## 2021-01-24 RX ADMIN — SERTRALINE HYDROCHLORIDE 50 MG: 50 TABLET ORAL at 08:49

## 2021-01-24 RX ADMIN — ARIPIPRAZOLE 20 MG: 15 TABLET ORAL at 21:05

## 2021-01-24 RX ADMIN — BUSPIRONE HYDROCHLORIDE 10 MG: 10 TABLET ORAL at 17:31

## 2021-01-24 RX ADMIN — ARIPIPRAZOLE 10 MG: 5 TABLET ORAL at 08:50

## 2021-01-24 RX ADMIN — Medication 4.5 MG: at 21:05

## 2021-01-24 RX ADMIN — BUSPIRONE HYDROCHLORIDE 10 MG: 10 TABLET ORAL at 08:49

## 2021-01-24 RX ADMIN — BENZTROPINE MESYLATE 1 MG: 1 TABLET ORAL at 08:49

## 2021-01-24 NOTE — PROGRESS NOTES
Problem: Falls - Risk of  Goal: *Absence of Falls  Description: Document Rebel Galdamez Fall Risk and appropriate interventions in the flowsheet. Outcome: Progressing Towards Goal  Note: Fall Risk Interventions:  Medication Interventions: Teach patient to arise slowly  Problem: Anxiety  Goal: *Alleviation of anxiety  Outcome: Progressing Towards Goal     Problem: Suicide  Goal: *STG: Remains safe in hospital  Outcome: Progressing Towards Goal  Goal: *STG: Seeks staff when feelings of self harm or harm towards others arise  Outcome: Progressing Towards Goal   Rec'd pt awake A&Ox4. Denies s/I, h/I and a/v hallucinations. Denies depression and anxiety. Reports having a good day since a place has been secured for him after discharge. No unsafe behavior noted or reported. 2795 Pt observed in bed eyes closed with even unlabored breathing. Continued to rest for long intervals with no signs of distress or discomfort. Maintained on q15 min safety checks.  Slept for 9.5 hrs

## 2021-01-24 NOTE — BH NOTES
Psychiatric Progress Note    Patient: Cony Mondragon MRN: [id number]  SSN: [id number]    YOB: 1995  Age: 22 y.o. Sex: male      Admit Date: 1/17/2021    LOS: 7 days     Subjective:     Cony Mondragon  reports feeling good and moods are good. Denies SI/HI/AH/VH. No aggression or violence. Appropriately interactive and aware. Tolerating medications well. Eating well and sleeping well. Proactive in his disposition planning. Tends to isolate to room. 1/21 - Cony Mondragon reports feeling well and moods are good. Denies SI/HI/AH/VH. Got upset and discombobulated when his room was changed. Was unable to tolerate this change. Put back on the acute unit and settled down. No aggression or violence. Appropriately interactive and aware. Tolerating medications well. Eating and sleeping fairly. 1/22 - Cony Mondragon reports feeling well and moods are good. Apologized for his outburst yesterday when his room was changed. He is comfortable in his current space and prefers to isolate there. Not the social type. Denies SI/HI/AH/VH. No aggression or violence. Appropriately interactive and aware. Tolerating medications well. Eating and sleeping fairly. Discharge focused. 1/23 - Cony Mondragon reports feeling well overall. He denies any active suicidal or homicidal thinking. He is alert and pleasant on interview. Staff report no major behavioral disturbances in the interim. Slept eight hours. 1/24 -Cony Mondragon reports feeling well today. He had some questions about his discharge plan which may best be answered by social work. No side effects to medications noted. Pleasant on interview. Slept 9.5 hours.     Objective:     Vitals:    01/22/21 0827 01/22/21 2014 01/23/21 0800 01/23/21 1945   BP: 119/74 (!) 130/42 113/62 134/73   Pulse: 86 88 86 83   Resp: 18 16 16 16   Temp: 97.4 °F (36.3 °C) 98 °F (36.7 °C) 98.2 °F (36.8 °C) 98.4 °F (36.9 °C)   SpO2: 100% 99% 99% 100% Weight:       Height:            Mental Status Exam:   Sensorium  oriented to time, place and person   Relations cooperative   Eye Contact appropriate   Appearance:  age appropriate   Speech:  normal volume and non-pressured   Thought Process: goal directed   Thought Content free of delusions and hallucinations   Suicidal ideations none   Mood:  euthymic   Affect:  full range and odd demeanor   Memory   adequate   Concentration:  adequate   Insight:  good   Judgment:  good       MEDICATIONS:  Current Facility-Administered Medications   Medication Dose Route Frequency    ARIPiprazole (ABILIFY) tablet 10 mg  10 mg Oral DAILY    ARIPiprazole (ABILIFY) tablet 20 mg  20 mg Oral QHS    benztropine (COGENTIN) tablet 1 mg  1 mg Oral BID    busPIRone (BUSPAR) tablet 10 mg  10 mg Oral TID    loratadine (CLARITIN) tablet 10 mg  10 mg Oral DAILY    melatonin tablet 4.5 mg  4.5 mg Oral QHS    QUEtiapine (SEROquel) tablet 300 mg  300 mg Oral QHS    sertraline (ZOLOFT) tablet 50 mg  50 mg Oral DAILY    OLANZapine (ZyPREXA) tablet 5 mg  5 mg Oral Q6H PRN    haloperidol lactate (HALDOL) injection 5 mg  5 mg IntraMUSCular Q6H PRN    benztropine (COGENTIN) tablet 1 mg  1 mg Oral BID PRN    diphenhydrAMINE (BENADRYL) injection 50 mg  50 mg IntraMUSCular BID PRN    hydrOXYzine HCL (ATARAX) tablet 50 mg  50 mg Oral TID PRN    LORazepam (ATIVAN) injection 1 mg  1 mg IntraMUSCular Q4H PRN    traZODone (DESYREL) tablet 50 mg  50 mg Oral QHS PRN    acetaminophen (TYLENOL) tablet 650 mg  650 mg Oral Q4H PRN    magnesium hydroxide (MILK OF MAGNESIA) 400 mg/5 mL oral suspension 30 mL  30 mL Oral DAILY PRN      DISCUSSION:   the risks and benefits of the proposed medication  patient given opportunity to ask questions    Lab/Data Review: All lab results for the last 24 hours reviewed. No results found for this or any previous visit (from the past 24 hour(s)). EKG = Normal sinus rhythm with sinus arrhythmia.   No previous ECGs available   Confirmed by Joana Matthews M.D., [place] ([id number]) on 1/21/2021 7:51:56 AM     Assessment:     Principal Problem:    Schizophrenia ([facility] [address] (1/17/2021)    Active Problems:    Autism (4/10/2019)        Plan:     Continue current care  Collateral information  Disposition planning with social work    I certify that this patient's inpatient psychiatric hospital services furnished since the previous certification were, and continue to be, required for treatment that could reasonably be expected to improve the patient's condition, or for diagnostic study, and that the patient continues to need, on a daily basis, active treatment furnished directly by or requiring the supervision of inpatient psychiatric facility personnel. In addition, the hospital records show that services furnished were intensive treatment services, admission or related services, or equivalent services.   Signed By: Shiv Goldman MD     January 24, 2021

## 2021-01-24 NOTE — BH NOTES
GROUP THERAPY PROGRESS NOTE    Patient did not participate in Coping Skills group.      Dior Nick MSW

## 2021-01-24 NOTE — BH NOTES
Assumed care of patient from [address] [place] [address]. Patient alert, verbal and oriented x4. Patient calm and cooperative during assessment. Patient denies feelings of depression, anxiety, SI/HI and AVH. Patient med and meal compliant. Patient seen visible in dayroom and hallway. Patien interacted well with staff and peers. No PRN's given. No acute behaviors. Q15 minute checks continued for safety.     Problem: Suicide  Goal: *STG: Remains safe in hospital  Outcome: Progressing Towards Goal  Goal: *STG: Attends activities and groups  Outcome: Progressing Towards Goal  Goal: *STG/LTG: Complies with medication therapy  Outcome: Progressing Towards Goal

## 2021-01-25 ENCOUNTER — APPOINTMENT (OUTPATIENT)
Dept: GENERAL RADIOLOGY | Age: 26
DRG: 750 | End: 2021-01-25
Attending: PSYCHIATRY & NEUROLOGY
Payer: MEDICAID

## 2021-01-25 LAB — SARS-COV-2, COV2: NORMAL

## 2021-01-25 PROCEDURE — 65220000003 HC RM SEMIPRIVATE PSYCH

## 2021-01-25 PROCEDURE — 74011250637 HC RX REV CODE- 250/637: Performed by: PSYCHIATRY & NEUROLOGY

## 2021-01-25 PROCEDURE — U0005 INFEC AGEN DETEC AMPLI PROBE: HCPCS

## 2021-01-25 PROCEDURE — 71045 X-RAY EXAM CHEST 1 VIEW: CPT

## 2021-01-25 RX ADMIN — QUETIAPINE FUMARATE 300 MG: 200 TABLET ORAL at 21:10

## 2021-01-25 RX ADMIN — BUSPIRONE HYDROCHLORIDE 10 MG: 10 TABLET ORAL at 17:06

## 2021-01-25 RX ADMIN — SERTRALINE HYDROCHLORIDE 50 MG: 50 TABLET ORAL at 08:33

## 2021-01-25 RX ADMIN — ARIPIPRAZOLE 10 MG: 5 TABLET ORAL at 08:32

## 2021-01-25 RX ADMIN — Medication 4.5 MG: at 21:10

## 2021-01-25 RX ADMIN — LORATADINE 10 MG: 10 TABLET ORAL at 08:33

## 2021-01-25 RX ADMIN — BUSPIRONE HYDROCHLORIDE 10 MG: 10 TABLET ORAL at 11:48

## 2021-01-25 RX ADMIN — BENZTROPINE MESYLATE 1 MG: 1 TABLET ORAL at 08:33

## 2021-01-25 RX ADMIN — ARIPIPRAZOLE 20 MG: 15 TABLET ORAL at 21:10

## 2021-01-25 RX ADMIN — BUSPIRONE HYDROCHLORIDE 10 MG: 10 TABLET ORAL at 08:33

## 2021-01-25 RX ADMIN — BENZTROPINE MESYLATE 1 MG: 1 TABLET ORAL at 17:07

## 2021-01-25 NOTE — GROUP NOTE
STEPH  GROUP DOCUMENTATION INDIVIDUAL Group Therapy Note Date: 1/25/2021 Group Start Time: 0900 Group End Time: 1000 Group Topic: Topic Group [facility] [place] 3 ACUTE BEHAV [facility] [place], [address] GROUP DOCUMENTATION GROUP Group Therapy Note Attendees: 6 Attendance: Did not attend Patient's Goal: Interventions/techniques: 
Stephanie Lebron

## 2021-01-25 NOTE — BH NOTES
GROUP THERAPY PROGRESS NOTE    Patient did not participate in Discharge Planning Group.      Willa Guthrie LPC [facility]

## 2021-01-25 NOTE — GROUP NOTE
STEPH  GROUP DOCUMENTATION INDIVIDUAL Group Therapy Note Date: 1/25/2021 Group Start Time: 1500 Group End Time: 4338 Group Topic: Recreational/Music Therapy [facility] [place] 3 ACUTE BEHAV [facility] [place], [address] GROUP DOCUMENTATION GROUP Group Therapy Note Attendees: 4 Attendance: Did not attend Patient's Goal: Interventions/techniques Cheryle Silva

## 2021-01-25 NOTE — PROGRESS NOTES
0800  Patient is awake and alert, denies SI, HI, and AVH. He denies pain and denies depression. He rates his anxiety at' a four or a five.'  He is reading is his room. He has eaten breakfast and is med compliant. 1000  Patient is in his room resting. 1200  Patient is out of room to  his tray and take his meal to his room. He is social when approached but stays in his room for the most part. 1400  Patient is resting quietly. 1600  Patient is on the phone with a relative. 1800  Patient is in his room resting quietly    [address]  Patient was swabbed for in house Covid 19. Specimen lableled and delivered to lab by me.

## 2021-01-25 NOTE — PROGRESS NOTES
Problem: Falls - Risk of  Goal: *Absence of Falls  Description: Document Doris Swenson Fall Risk and appropriate interventions in the flowsheet.   Outcome: Progressing Towards Goal  Note: Fall Risk Interventions:            Medication Interventions: Teach patient to arise slowly                   Problem: Patient Education: Go to Patient Education Activity  Goal: Patient/Family Education  Outcome: Progressing Towards Goal     Problem: Anxiety  Goal: *Alleviation of anxiety  Outcome: Progressing Towards Goal

## 2021-01-25 NOTE — BH NOTES
GROUP THERAPY PROGRESS NOTE    Patient did not participate in Coping Skills group.      Vika Dennison LPC LSATP [facility]

## 2021-01-25 NOTE — BH NOTES
Behavioral Health Treatment Team Note      Patient goal(s) for today: continue taking meds as prescribe, engage in unit activities, participate in hygiene/ADLs, prepare for discharge, follow directions from staff, contact support team  Treatment team focus/goals: continue adjusting meds as needed, discharge planning, update support system     Progress note:   Patient presented with constricted affect, anxious mood, fleeting eye contact, cooperative attitude, fluid speech, logical thought process, and appropriate content. Patient asked several questions regarding transition to crisis stabilization. He is agreeable to step-down until placement in long-term. He reported no issues over the weekend (confirmed through note review) and states that he is having no SI/HI or hearing voices. Patient denied issues with medication and states that he is eating and sleeping well. SW faxed physical and [id number] to [facility]. SW requested rapid COVID test and chest xray. RENZO followed up with patient's mother to answer questions regarding two placements. She is in agreement with both.    RENZO coordinated admission to [facility] crisis stabilization tomorrow through [facility] ([phone])        LOS:  8                       Expected LOS: 9     Insurance info/prescription [facility] [address]  Date of last family contact:  [facility] for mother, Dante Fraser ([phone], 1/22/21)  Family requesting physician contact today:  no  Discharge plan:  long-term placement  Guns in the home:  no   Outpatient provider(s):  Danitza SHAH (HARVEY Santizo [phone], 1/20/21), [facility] (Real Hou, attempted 1/20/21)     Participating treatment team ANA Pinedo, Dr. Lucila Zuleta MD

## 2021-01-25 NOTE — GROUP NOTE
STEPH  GROUP DOCUMENTATION INDIVIDUAL Group Therapy Note Date: 1/25/2021 Group Start Time: 1100 Group End Time: 1200 Group Topic: Topic Group [address] 3 ACUTE BEHAV [facility], [address] [facility] GROUP DOCUMENTATION GROUP Group Therapy Note Attendees: 5 Attendance: Did not attend Patient's Goal: Interventions/techniques Angelito Alston

## 2021-01-25 NOTE — PROGRESS NOTES
Problem: Anxiety  Goal: *Alleviation of anxiety  Outcome: Progressing Towards Goal     Problem: Suicide  Goal: *STG: Remains safe in hospital  Outcome: Progressing Towards Goal  Goal: *STG/LTG: Complies with medication therapy  Outcome: Progressing Towards Goal   1930 Pt is visible on the unit for short intervals. Denies s/I, h/I and a/v hallucinations. Denies anxiety and depression. Accepts medications as prescribed. Maintains control. Will continue to monitor. 6935 observed in bed eyes closed with even unlabored breathing. Continued to rest for long intervals with no signs of distress or discomfort. Maintained on q15 min safety checks. Appeared to be sleeping 7 hrs.

## 2021-01-25 NOTE — BH NOTES
Psychiatric Progress Note    Patient: Delano Cleary MRN: [id number]  SSN: [id number]    YOB: 1995  Age: 22 y.o. Sex: male      Admit Date: 1/17/2021    LOS: 8 days     Subjective:     Delano Cleary  reports feeling good and moods are good. Denies SI/HI/AH/VH. No aggression or violence. Appropriately interactive and aware. Tolerating medications well. Eating well and sleeping well. Proactive in his disposition planning. Tends to isolate to room. 1/21 - Delano Cleary reports feeling well and moods are good. Denies SI/HI/AH/VH. Got upset and discombobulated when his room was changed. Was unable to tolerate this change. Put back on the acute unit and settled down. No aggression or violence. Appropriately interactive and aware. Tolerating medications well. Eating and sleeping fairly. 1/22 - Delano Cleary reports feeling well and moods are good. Apologized for his outburst yesterday when his room was changed. He is comfortable in his current space and prefers to isolate there. Not the social type. Denies SI/HI/AH/VH. No aggression or violence. Appropriately interactive and aware. Tolerating medications well. Eating and sleeping fairly. Discharge focused. 1/23 - Delano Cleary reports feeling well overall. He denies any active suicidal or homicidal thinking. He is alert and pleasant on interview. Staff report no major behavioral disturbances in the interim. Slept eight hours. 1/24 -Delano Cleary reports feeling well today. He had some questions about his discharge plan which may best be answered by social work. No side effects to medications noted. Pleasant on interview. Slept 9.5 hours. 1/25 - Delano Cleary reports feeling well and moods are good. Denies SI/HI/AH/VH. No aggression or violence. Appropriately interactive and aware. Tolerating medications well. Eating and sleeping fairly.   Poor eye contact however in line with his Kylah.       Objective:     Vitals:    01/24/21 0756 01/24/21 2000 01/25/21 0744 01/25/21 0922   BP: 118/69 124/74 (!) 77/52 138/72   Pulse: 100 84 79 80   Resp: 16 16 18 20   Temp: 98.4 °F (36.9 °C) 98.3 °F (36.8 °C) 97.6 °F (36.4 °C) 98 °F (36.7 °C)   SpO2: 99% 100% 100% 100%   Weight:       Height:            Mental Status Exam:   Sensorium  oriented to time, place and person   Relations cooperative   Eye Contact appropriate   Appearance:  age appropriate   Speech:  normal volume and non-pressured   Thought Process: goal directed   Thought Content free of delusions and hallucinations   Suicidal ideations none   Mood:  euthymic   Affect:  full range and odd demeanor   Memory   adequate   Concentration:  adequate   Insight:  good   Judgment:  good       MEDICATIONS:  Current Facility-Administered Medications   Medication Dose Route Frequency    ARIPiprazole (ABILIFY) tablet 10 mg  10 mg Oral DAILY    ARIPiprazole (ABILIFY) tablet 20 mg  20 mg Oral QHS    benztropine (COGENTIN) tablet 1 mg  1 mg Oral BID    busPIRone (BUSPAR) tablet 10 mg  10 mg Oral TID    loratadine (CLARITIN) tablet 10 mg  10 mg Oral DAILY    melatonin tablet 4.5 mg  4.5 mg Oral QHS    QUEtiapine (SEROquel) tablet 300 mg  300 mg Oral QHS    sertraline (ZOLOFT) tablet 50 mg  50 mg Oral DAILY    OLANZapine (ZyPREXA) tablet 5 mg  5 mg Oral Q6H PRN    haloperidol lactate (HALDOL) injection 5 mg  5 mg IntraMUSCular Q6H PRN    benztropine (COGENTIN) tablet 1 mg  1 mg Oral BID PRN    diphenhydrAMINE (BENADRYL) injection 50 mg  50 mg IntraMUSCular BID PRN    hydrOXYzine HCL (ATARAX) tablet 50 mg  50 mg Oral TID PRN    LORazepam (ATIVAN) injection 1 mg  1 mg IntraMUSCular Q4H PRN    traZODone (DESYREL) tablet 50 mg  50 mg Oral QHS PRN    acetaminophen (TYLENOL) tablet 650 mg  650 mg Oral Q4H PRN    magnesium hydroxide (MILK OF MAGNESIA) 400 mg/5 mL oral suspension 30 mL  30 mL Oral DAILY PRN      DISCUSSION:   the risks and benefits of the proposed medication  patient given opportunity to ask questions    Lab/Data Review: All lab results for the last 24 hours reviewed. No results found for this or any previous visit (from the past 24 hour(s)). EKG = Normal sinus rhythm with sinus arrhythmia. No previous ECGs available   Confirmed by Tevin Anthony M.D., Rod Huffman ([id number]) on 1/21/2021 7:51:56 AM     Assessment:     Principal Problem:    Schizophrenia ([facility] [address] (1/17/2021)    Active Problems:    Autism (4/10/2019)        Plan:     Continue current care  Collateral information  Chest -ray  Rapid COVID test for placement  Disposition planning with social work for tomorrow    I certify that this patient's inpatient psychiatric hospital services furnished since the previous certification were, and continue to be, required for treatment that could reasonably be expected to improve the patient's condition, or for diagnostic study, and that the patient continues to need, on a daily basis, active treatment furnished directly by or requiring the supervision of inpatient psychiatric facility personnel. In addition, the hospital records show that services furnished were intensive treatment services, admission or related services, or equivalent services.   Signed By: Domenick Bumpers, MD     January 25, 2021

## 2021-01-26 VITALS
DIASTOLIC BLOOD PRESSURE: 69 MMHG | OXYGEN SATURATION: 100 % | HEART RATE: 80 BPM | BODY MASS INDEX: 19.65 KG/M2 | RESPIRATION RATE: 16 BRPM | WEIGHT: 158 LBS | TEMPERATURE: 97.1 F | SYSTOLIC BLOOD PRESSURE: 130 MMHG | HEIGHT: 75 IN

## 2021-01-26 LAB
SARS-COV-2, XPLCVT: NOT DETECTED
SOURCE, COVRS: NORMAL

## 2021-01-26 PROCEDURE — 74011250637 HC RX REV CODE- 250/637: Performed by: PSYCHIATRY & NEUROLOGY

## 2021-01-26 RX ORDER — BENZTROPINE MESYLATE 1 MG/1
1 TABLET ORAL 2 TIMES DAILY
Qty: 60 TAB | Refills: 0 | Status: SHIPPED | OUTPATIENT
Start: 2021-01-26 | End: 2021-01-26 | Stop reason: SDUPTHER

## 2021-01-26 RX ORDER — BUSPIRONE HYDROCHLORIDE 10 MG/1
10 TABLET ORAL 3 TIMES DAILY
Qty: 30 TAB | Refills: 0 | Status: ON HOLD | OUTPATIENT
Start: 2021-01-26 | End: 2022-01-03

## 2021-01-26 RX ORDER — ARIPIPRAZOLE 20 MG/1
20 TABLET ORAL
Qty: 30 TAB | Refills: 0 | Status: ON HOLD | OUTPATIENT
Start: 2021-01-26 | End: 2022-01-03

## 2021-01-26 RX ORDER — HYDROXYZINE PAMOATE 50 MG/1
50 CAPSULE ORAL
Qty: 90 CAP | Refills: 0 | Status: SHIPPED | OUTPATIENT
Start: 2021-01-26 | End: 2021-01-26 | Stop reason: SDUPTHER

## 2021-01-26 RX ORDER — ARIPIPRAZOLE 20 MG/1
20 TABLET ORAL
Qty: 30 TAB | Refills: 0 | Status: SHIPPED | OUTPATIENT
Start: 2021-01-26 | End: 2021-01-26 | Stop reason: SDUPTHER

## 2021-01-26 RX ORDER — HYDROXYZINE PAMOATE 50 MG/1
50 CAPSULE ORAL
Qty: 90 CAP | Refills: 0 | Status: ON HOLD | OUTPATIENT
Start: 2021-01-26 | End: 2022-01-05 | Stop reason: SDUPTHER

## 2021-01-26 RX ORDER — BUSPIRONE HYDROCHLORIDE 10 MG/1
10 TABLET ORAL 3 TIMES DAILY
Qty: 30 TAB | Refills: 0 | Status: SHIPPED | OUTPATIENT
Start: 2021-01-26 | End: 2021-01-26 | Stop reason: SDUPTHER

## 2021-01-26 RX ORDER — QUETIAPINE FUMARATE 300 MG/1
300 TABLET, FILM COATED ORAL
Qty: 30 TAB | Refills: 0 | Status: ON HOLD | OUTPATIENT
Start: 2021-01-26 | End: 2022-01-03

## 2021-01-26 RX ORDER — QUETIAPINE FUMARATE 50 MG/1
50 TABLET, FILM COATED ORAL
Qty: 30 TAB | Refills: 0 | Status: SHIPPED | OUTPATIENT
Start: 2021-01-26 | End: 2021-10-11 | Stop reason: ALTCHOICE

## 2021-01-26 RX ORDER — BENZTROPINE MESYLATE 1 MG/1
1 TABLET ORAL 2 TIMES DAILY
Qty: 60 TAB | Refills: 0 | Status: ON HOLD | OUTPATIENT
Start: 2021-01-26 | End: 2022-01-05 | Stop reason: SDUPTHER

## 2021-01-26 RX ORDER — LORATADINE 10 MG/1
10 TABLET ORAL DAILY
Qty: 30 TAB | Refills: 0 | Status: SHIPPED | OUTPATIENT
Start: 2021-01-26 | End: 2021-01-26 | Stop reason: SDUPTHER

## 2021-01-26 RX ORDER — LORATADINE 10 MG/1
10 TABLET ORAL DAILY
Qty: 30 TAB | Refills: 0 | Status: SHIPPED | OUTPATIENT
Start: 2021-01-26 | End: 2021-10-11 | Stop reason: ALTCHOICE

## 2021-01-26 RX ORDER — ARIPIPRAZOLE 10 MG/1
10 TABLET ORAL DAILY
Qty: 30 TAB | Refills: 0 | Status: SHIPPED | OUTPATIENT
Start: 2021-01-26 | End: 2021-01-26 | Stop reason: SDUPTHER

## 2021-01-26 RX ORDER — ARIPIPRAZOLE 10 MG/1
10 TABLET ORAL DAILY
Qty: 30 TAB | Refills: 0 | Status: ON HOLD | OUTPATIENT
Start: 2021-01-26 | End: 2022-01-03

## 2021-01-26 RX ORDER — SERTRALINE HYDROCHLORIDE 50 MG/1
50 TABLET, FILM COATED ORAL DAILY
Qty: 30 TAB | Refills: 0 | Status: SHIPPED | OUTPATIENT
Start: 2021-01-26 | End: 2022-01-03

## 2021-01-26 RX ORDER — QUETIAPINE FUMARATE 300 MG/1
300 TABLET, FILM COATED ORAL
Qty: 30 TAB | Refills: 0 | Status: SHIPPED | OUTPATIENT
Start: 2021-01-26 | End: 2021-01-26 | Stop reason: SDUPTHER

## 2021-01-26 RX ORDER — QUETIAPINE FUMARATE 50 MG/1
50 TABLET, FILM COATED ORAL
Qty: 30 TAB | Refills: 0 | Status: SHIPPED | OUTPATIENT
Start: 2021-01-26 | End: 2021-01-26 | Stop reason: SDUPTHER

## 2021-01-26 RX ORDER — MELATONIN 5 MG
5 CAPSULE ORAL
Qty: 30 CAP | Refills: 0 | Status: SHIPPED | OUTPATIENT
Start: 2021-01-26 | End: 2021-10-11 | Stop reason: ALTCHOICE

## 2021-01-26 RX ORDER — SERTRALINE HYDROCHLORIDE 50 MG/1
50 TABLET, FILM COATED ORAL DAILY
Qty: 30 TAB | Refills: 0 | Status: SHIPPED | OUTPATIENT
Start: 2021-01-26 | End: 2021-01-26 | Stop reason: SDUPTHER

## 2021-01-26 RX ORDER — MELATONIN 5 MG
5 CAPSULE ORAL
Qty: 30 CAP | Refills: 0 | Status: SHIPPED | OUTPATIENT
Start: 2021-01-26 | End: 2021-01-26 | Stop reason: SDUPTHER

## 2021-01-26 RX ADMIN — SERTRALINE HYDROCHLORIDE 50 MG: 50 TABLET ORAL at 08:44

## 2021-01-26 RX ADMIN — BENZTROPINE MESYLATE 1 MG: 1 TABLET ORAL at 08:45

## 2021-01-26 RX ADMIN — ARIPIPRAZOLE 10 MG: 5 TABLET ORAL at 08:44

## 2021-01-26 RX ADMIN — BUSPIRONE HYDROCHLORIDE 10 MG: 10 TABLET ORAL at 08:44

## 2021-01-26 RX ADMIN — LORATADINE 10 MG: 10 TABLET ORAL at 08:45

## 2021-01-26 RX ADMIN — BUSPIRONE HYDROCHLORIDE 10 MG: 10 TABLET ORAL at 12:11

## 2021-01-26 NOTE — PROGRESS NOTES
Problem: Falls - Risk of  Goal: *Absence of Falls  Description: Document Loren Blood Fall Risk and appropriate interventions in the flowsheet.   Outcome: Progressing Towards Goal  Note: Fall Risk Interventions:            Medication Interventions: Teach patient to arise slowly

## 2021-01-26 NOTE — GROUP NOTE
STEPH  GROUP DOCUMENTATION INDIVIDUAL Group Therapy Note Date: 1/26/2021 Group Start Time: 1100 Group End Time: 1200 Group Topic: Topic Group [facility] [place] 3 ACUTE BEHAV [facility], [address] GROUP DOCUMENTATION GROUP Group Therapy Note Attendees: 5 Attendance: Attended Patient's Goal:  To participate in relaxation activity Interventions/techniques: Supported-music Follows Directions:  
 
Interactions: Interacted appropriately Mental Status: Calm Behavior/appearance: Needed prompting Goals Achieved: Able to engage in interactions and Able to listen to others Additional Notes:  Pt left session early. Delmar Hudson

## 2021-01-26 NOTE — SUICIDE SAFETY PLAN
SAFETY PLAN    A suicide Safety Plan is a document that supports someone when they are having thoughts of suicide. Warning Signs that indicate a suicidal crisis may be developing: What (situations, thoughts, feelings, body sensations, behaviors, etc.) do you experience that lets you know you are beginning to think about suicide? 1. cursing  2. yelling  3. pacing    Internal Coping Strategies:  What things can I do (relaxation techniques, hobbies, physical activities, etc.) to take my mind off my problems without contacting another person? 1. writing  2. Listening to music on my tablet  3. Video games    People and social settings that provide distraction: Who can I call or where can I go to distract me? 1. Name: Mother  Phone: [phone]  2. Name:  Phone:    3. Place: music            4. Place: writing    People whom I can ask for help: Who can I call when I need help - for example, friends, family, clergy, someone else? 1. Name: Mom                Phone: [phone]  2. Name: Jax De La Rosa  Phone: [phone]  3. Name: Alaynadeloris Benishari  Phone: [phone]    Professionals or [address] agencies I can contact during a crisis: Who can I call for help - for example, my doctor, my psychiatrist, my psychologist, a mental health provider, a suicide hotline? 1. Clinician Name: [facility]   Phone: [phone]      Clinician Pager or Emergency Contact #: [phone]    2. Clinician Name: [facility]   Phone: [phone]      Clinician Pager or Emergency Contact #: [phone]    3. Suicide Prevention Lifeline: 9-538-773-TALK (7912)    4. [address] [place] [address] Emergency Services -  for example, [facility], [facility] suicide hotline, [facility] Hotline: [facility]      Emergency Services Address: [facility], [address] [facility]      Emergency Services Phone: [phone]    Making the environment safe:  How can I make my environment (house/apartment/living space) safer? For example, can I remove guns, medications, and other items? 1. No weapons  2.  No safety concerns

## 2021-01-26 NOTE — BH NOTES
GROUP THERAPY PROGRESS NOTE    Patient did not participate in Substance abuse/Coping Skill group.      Keon Ortiz LPC LSATP [facility]

## 2021-01-26 NOTE — BH NOTES
Behavioral Health Treatment Team Note      Patient goal(s) for today: continue taking meds as prescribe, engage in unit activities, participate in hygiene/ADLs, prepare for discharge, follow directions from staff, contact support team  Treatment team focus/goals: continue adjusting meds as needed, discharge planning, update support system     Progress note:   Patient presented with anxious demeanor but denies being nervous, constricted affect, fluid speech, logical thought process, appropriate content, appropriate eye contact. He reports he is ready for discharge. RENZO answered questions regarding his transition to crisis stabilization and then Johnny. He denies SI/HI, AVH, medication issues. Safety plan completed. RENZO followed up with Michael Solorzano at [facility] to confirm discharge plan to [facility], then Johnny. She will follow up with patient this afternoon. 2/3 is next psychiatric appointment. RENZO followed up with patient's mother and provided contact information for JEROME and Johnny.  RENZO confirmed patient's follow up plans with [facility]        TZI:  1                       HHGNIDMA LOS: 9     Insurance info/prescription [facility] [address]  Date of last family contact:  RAFAL for mother, Fan Soliman ([phone], 1/26/21)  Family requesting physician contact today:  no  Discharge plan:  STACY placement  Guns in the home:  no   Outpatient provider(s):  Dan[facility] SH[facility] (HARVEY Amezquita [phone], 1/26/21), [facility] (Candance August, attempted 1/20/21)     Participating treatment team ANA Pond, Dr. Cristobal Acevedo MD

## 2021-01-26 NOTE — GROUP NOTE
STEPH  GROUP DOCUMENTATION INDIVIDUAL Group Therapy Note Date: 1/26/2021 Group Start Time: 0900 Group End Time: 1000 Group Topic: Topic Group [facility] [place] 3 ACUTE BEHAV [facility] [place], [address] GROUP DOCUMENTATION GROUP Group Therapy Note Attendees: 4 Attendance: Did not attend Patient's Goal: Interventions/techniques:Radha Richardson

## 2021-01-26 NOTE — PROGRESS NOTES
Problem: Anxiety  Goal: *Alleviation of anxiety  Outcome: Progressing Towards Goal     Problem: Suicide  Goal: *STG: Remains safe in hospital  Outcome: Progressing Towards Goal     Problem: Suicide  Goal: *STG: Seeks staff when feelings of self harm or harm towards others arise  Outcome: Progressing Towards Goal   Pt is visible on the unit. Calm and cooperative with care and unit routines. Accepts medications as prescribed. Denies s/e. Spent the evening journaling, talking on the phone and writing his favorite [employer] songs in his notebook. Denies s/I, h/I and a/v hallucinations. Pt brighter and appears less anxious and preoccupied this evening. Denies depression and anxiety. Will continue to monitor. 6499 pt observed in bed eyes closed with even unlabored breathing. Continued to rest for long intervals with no signs of distress or discomfort. Maintained on q15 min safety checks.  Appeared to be sleeping 7.5 hrs

## 2021-01-26 NOTE — BH NOTES
0700-Report received from [facility]. [id number]- Patient is alert and orientated 4, denies pain or discomfort, denies SI/HI but is pacing and responding to internal stimuli at this time, he is medication and meals compliant at this time, no safety or behavior isssues noted. 1000-1200-Patient met with the treatment team and will be discharge to [facility] this shift. 1200-1345-Patient was  Explained all the discharge instruction and given the opportunity to ask question and given appropriate answers. 1345-Patient was escorted down to the ED for discharge at this time.

## 2021-01-27 NOTE — BH NOTES
Behavioral Health Transition Record to Provider    Patient Name: Bozena Domingo  YOB: 1995  Medical Record Number: [id number]  Date of Admission: 1/17/2021  Date of Discharge: 1/26/21    Attending Provider: No att. providers found  Discharging Provider: Dr. Kuldeep Culp MD  To contact this individual call [phone] and ask the  to page. If unavailable, ask to be transferred to [facility] Provider on call. [facility] Provider will be available on call 24/7 and during holidays. Primary Care Provider: Mariajose Morin MD    Allergies   Allergen Reactions    Aspirin Hives       Reason for Admission: Patient reports he was hearing voices that were telling him to harm himself by shooting (reports he does not have a gun). He states he was kicked out of his mother's home without his medicine.     Admission Diagnosis: Schizophrenia ([facility] [address] [F20.9]    * No surgery found *    Results for orders placed or performed during the hospital encounter of 01/17/21   SARS-COV-2, PCR    Specimen: Nasopharyngeal   Result Value Ref Range    Specimen source Nasopharyngeal      SARS-CoV-2 Not detected NOTD     ETHYL ALCOHOL   Result Value Ref Range    ALCOHOL(ETHYL),SERUM <10 <10 MG/DL   DRUG SCREEN, URINE   Result Value Ref Range    AMPHETAMINES Negative NEG      BARBITURATES Negative NEG      BENZODIAZEPINES Negative NEG      COCAINE Negative NEG      METHADONE Negative NEG      OPIATES Negative NEG      PCP(PHENCYCLIDINE) Negative NEG      THC (TH-CANNABINOL) Negative NEG      Drug screen comment (NOTE)    URINALYSIS W/ REFLEX CULTURE    Specimen: Urine   Result Value Ref Range    Color YELLOW/STRAW      Appearance CLEAR CLEAR      Specific gravity 1.010 1.003 - 1.030      pH (UA) 7.0 5.0 - 8.0      Protein Negative NEG mg/dL    Glucose Negative NEG mg/dL    Ketone Negative NEG mg/dL    Bilirubin Negative NEG      Blood Negative NEG      Urobilinogen 1.0 0.2 - 1.0 EU/dL    Nitrites Negative NEG      Leukocyte Esterase Negative NEG      WBC 0-4 0 - 4 /hpf    RBC 0-5 0 - 5 /hpf    Epithelial cells FEW FEW /lpf    Bacteria Negative NEG /hpf    UA:UC IF INDICATED CULTURE NOT INDICATED BY UA RESULT CNI     SARS-COV-2   Result Value Ref Range    Specimen source Nasopharyngeal      Specimen source Nasopharyngeal      COVID-19 rapid test Not detected NOTD      Specimen type NP Swab      Health status Symptomatic Testing     CBC WITH AUTOMATED DIFF   Result Value Ref Range    WBC 4.2 4.1 - 11.1 K/uL    RBC 4.90 4.10 - 5.70 M/uL    HGB 13.4 12.1 - 17.0 g/dL    HCT 41.4 36.6 - 50.3 %    MCV 84.5 80.0 - 99.0 FL    MCH 27.3 26.0 - 34.0 PG    MCHC 32.4 30.0 - 36.5 g/dL    RDW 13.5 11.5 - 14.5 %    PLATELET 212 150 - 400 K/uL    MPV 9.7 8.9 - 12.9 FL    NRBC 0.0 0  WBC    ABSOLUTE NRBC 0.00 0.00 - 0.01 K/uL    NEUTROPHILS 79 (H) 32 - 75 %    LYMPHOCYTES 16 12 - 49 %    MONOCYTES 4 (L) 5 - 13 %    EOSINOPHILS 0 0 - 7 %    BASOPHILS 0 0 - 1 %    IMMATURE GRANULOCYTES 1 (H) 0.0 - 0.5 %    ABS. NEUTROPHILS 3.3 1.8 - 8.0 K/UL    ABS. LYMPHOCYTES 0.7 (L) 0.8 - 3.5 K/UL    ABS. MONOCYTES 0.2 0.0 - 1.0 K/UL    ABS. EOSINOPHILS 0.0 0.0 - 0.4 K/UL    ABS. BASOPHILS 0.0 0.0 - 0.1 K/UL    ABS. IMM. GRANS. 0.0 0.00 - 0.04 K/UL    DF SMEAR SCANNED      RBC COMMENTS NORMOCYTIC, NORMOCHROMIC     METABOLIC PANEL, COMPREHENSIVE   Result Value Ref Range    Sodium 142 136 - 145 mmol/L    Potassium 5.1 3.5 - 5.1 mmol/L    Chloride 106 97 - 108 mmol/L    CO2 30 21 - 32 mmol/L    Anion gap 6 5 - 15 mmol/L    Glucose 89 65 - 100 mg/dL    BUN 13 6 - 20 MG/DL    Creatinine 1.52 (H) 0.70 - 1.30 MG/DL    BUN/Creatinine ratio 9 (L) 12 - 20      GFR est AA >60 >60 ml/min/1.73m2    GFR est non-AA 56 (L) >60 ml/min/1.73m2    Calcium 8.9 8.5 - 10.1 MG/DL    Bilirubin, total 0.5 0.2 - 1.0 MG/DL    ALT (SGPT) 23 12 - 78 U/L    AST (SGOT) 21 15 - 37 U/L    Alk. phosphatase 112 45 - 117 U/L    Protein, total 7.4 6.4 - 8.2 g/dL    Albumin  4.1 3.5 - 5.0 g/dL    Globulin 3.3 2.0 - 4.0 g/dL    A-G Ratio 1.2 1.1 - 2.2     TSH 3RD GENERATION   Result Value Ref Range    TSH 0.79 0.36 - 3.74 uIU/mL   LIPID PANEL   Result Value Ref Range    LIPID PROFILE          Cholesterol, total 115 <200 MG/DL    Triglyceride 47 <150 MG/DL    HDL Cholesterol 56 MG/DL    LDL, calculated 49.6 0 - 100 MG/DL    VLDL, calculated 9.4 MG/DL    CHOL/HDL Ratio 2.1 0.0 - 5.0     GLUCOSE, FASTING   Result Value Ref Range    Glucose 81 65 - 100 MG/DL   SARS-COV-2   Result Value Ref Range    SARS-CoV-2 Please find results under separate order     SARS-COV-2   Result Value Ref Range    Specimen source Nasopharyngeal      SARS-CoV-2 NOT DETECTED NOTDET     EKG, 12 LEAD, INITIAL   Result Value Ref Range    Ventricular Rate 64 BPM    Atrial Rate 64 BPM    P-R Interval 152 ms    QRS Duration 86 ms    Q-T Interval 376 ms    QTC Calculation (Bezet) 387 ms    Calculated P Axis 83 degrees    Calculated R Axis 87 degrees    Calculated T Axis 72 degrees    Diagnosis       ** Age and gender specific ECG analysis **  Normal sinus rhythm with sinus arrhythmia  No previous ECGs available  Confirmed by Suleman Chinchilla M.D., Corinne Diamond ([id number]) on 1/21/2021 7:51:56 AM         Immunizations administered during this encounter:   Immunization History   Administered Date(s) Administered    DTaP 1995, 1995, 01/16/1996, 09/27/1996, 05/25/2000    Hep A Vaccine 06/27/2008, 08/23/2010    Hep B Vaccine 1995, 1995, 01/16/1996, 01/14/2002    Hib 1995, 1995, 01/16/1996, 09/27/1996    Influenza Vaccine Qlibri) PF (>6 Mo Flulaval, Fluarix, and >3 Yrs Afluria, Fluzone 11988) 09/05/2019, 01/14/2021    MMR 07/11/1996, 05/25/2000    Poliovirus vaccine 1995, 1995, 01/16/1996, 05/25/2000    Td, Adsorbed 09/05/2019    Tdap 08/18/2006    Varicella Virus Vaccine 05/25/2000, 06/27/2008       Screening for Metabolic Disorders for Patients on Antipsychotic Medications  (Data obtained from the EMR)    Estimated Body Mass Index  Estimated body mass index is 19.75 kg/m² as calculated from the following:    Height as of this encounter: 6' 3\" (1.905 m). Weight as of this encounter: 71.7 kg (158 lb). Vital Signs/Blood Pressure  Visit Vitals  /69 (BP 1 Location: Left arm)   Pulse 80   Temp 97.1 °F (36.2 °C)   Resp 16   Ht 6' 3\" (1.905 m)   Wt 71.7 kg (158 lb)   SpO2 100%   BMI 19.75 kg/m²       Blood Glucose/Hemoglobin A1c  Lab Results   Component Value Date/Time    Glucose 81 01/19/2021 05:30 AM       Lab Results   Component Value Date/Time    Hemoglobin A1c 5.3 03/05/2020 11:52 AM        Lipid Panel  Lab Results   Component Value Date/Time    Cholesterol, total 115 01/19/2021 05:30 AM    HDL Cholesterol 56 01/19/2021 05:30 AM    LDL, calculated 49.6 01/19/2021 05:30 AM    Triglyceride 47 01/19/2021 05:30 AM    CHOL/HDL Ratio 2.1 01/19/2021 05:30 AM        Discharge Diagnosis: please refer to physician's discharge summary    Discharge Plan: The patient Cony Mondragon exhibits the ability to control behavior in a less restrictive environment. Patient's level of functioning is improving. No assaultive/destructive behavior has been observed for the past 24 hours. No suicidal/homicidal threat or behavior has been observed for the past 24 hours. There is no evidence of serious medication side effects. Patient has not been in physical or protective restraints for at least the past 24 hours. If weapons involved, how are they secured? No weapons    Is patient aware of and in agreement with discharge plan? Patient agrees to discharge to step-down crisis stabilization until 2/1/21 when he can move into [facility].     Arrangements for medication:  Prescriptions given to patient    Copy of discharge instructions to provider?:  Fax to Arnav Burris at [phone] or [phone]    Arrangements for transportation home:  Crisis stabilization to transport    Keep all follow up appointments as scheduled, continue to take prescribed medications per physician instructions. Mental health crisis number:  [phone] or your local mental health crisis line number at [phone]. Discharge Medication List and Instructions:   Discharge Medication List as of 1/26/2021 11:44 AM      CONTINUE these medications which have CHANGED    Details   !! ARIPiprazole (ABILIFY) 20 mg tablet Take 1 Tab by mouth nightly. Indications: schizophrenia, Print, Disp-30 Tab, R-0      !! ARIPiprazole (ABILIFY) 10 mg tablet Take 1 Tab by mouth daily. Indications: schizophrenia, Print, Disp-30 Tab, R-0      benztropine (COGENTIN) 1 mg tablet Take 1 Tab by mouth two (2) times a day. Indications: extrapyramidal symptoms as a result of taking the medication, Print, Disp-60 Tab, R-0      busPIRone (BUSPAR) 10 mg tablet Take 1 Tab by mouth three (3) times daily. Indications: repeated episodes of anxiety, Print, Disp-30 Tab, R-0      loratadine (CLARITIN) 10 mg tablet Take 1 Tab by mouth daily. Indications: inflammation of the nose due to an allergy, Print, Disp-30 Tab, R-0      hydrOXYzine pamoate (VISTARIL) 50 mg capsule Take 1 Cap by mouth every eight (8) hours as needed for Anxiety. Indications: anxious, Print, Disp-90 Cap, R-0      sertraline (ZOLOFT) 50 mg tablet Take 1 Tab by mouth daily. Indications: repeated episodes of anxiety, depression, Print, Disp-30 Tab, R-0      melatonin 5 mg cap capsule Take 1 Cap by mouth nightly. Indications: insomnia, Print, Disp-30 Cap, R-0      !! QUEtiapine (SEROquel) 300 mg tablet Take 1 Tab by mouth nightly. Indications: schizophrenia, insomnia, Print, Disp-30 Tab, R-0      !! QUEtiapine (SEROquel) 50 mg tablet Take 1 Tab by mouth daily as needed (psychosis). Indications: bipolar depression, Normal, Disp-30 Tab, R-0       !! - Potential duplicate medications found. Please discuss with provider.           Unresulted Labs (24h ago, onward)    None        To obtain results of studies pending at discharge, please contact N/A    Follow-up Information     Follow up With Specialties Details Why Contact Info    [address]  Call today Royce Lay will call you this afternoon to check in. [address]  [phone]    [address] [place] [address] on 2/3/2021 Please attend your appointment with Levon Rao on 2/3/21 at 4:00 [address]  [phone]    [facility]  Go today Transition to crisis stabilization today Crisis Stabilization Program  Garrett Allen Allan Director  [phone]    [facility]  Go on 2/1/2021 Transition to [facility] on 2/1/21 [address],                                                  Nate Grace, [address]  [facility] [phone]    [address] Bryce Rodrigues MD Family Medicine   [address] [place] [id number]            Advanced Directive:   Does the patient have an appointed surrogate decision maker? No  Does the patient have a Medical Advance Directive? No  Does the patient have a Psychiatric Advance Directive? No  If the patient does not have a surrogate or Medical Advance Directive AND Psychiatric Advance Directive, the patient was offered information on these advance directives Patient declined to complete    Patient Instructions: Please continue all medications until otherwise directed by physician. Tobacco Cessation Discharge Plan:   Is the patient a smoker and needs referral for smoking cessation? Not applicable  Patient referred to the following for smoking cessation with an appointment? Not applicable     Patient was offered medication to assist with smoking cessation at discharge? Not applicable  Was education for smoking cessation added to the discharge instructions? Not applicable    Alcohol/Substance Abuse Discharge Plan:   Does the patient have a history of substance/alcohol abuse and requires a referral for treatment?  Not applicable  Patient referred to the following for substance/alcohol abuse treatment with an appointment? Not applicable  Patient was offered medication to assist with alcohol cessation at discharge? Not applicable  Was education for substance/alcohol abuse added to discharge instructions? Not applicable    Patient discharged to Home; discussed with patient/caregiver and provided to the patient/caregiver either in hard copy or electronically.

## 2021-09-24 ENCOUNTER — OFFICE VISIT (OUTPATIENT)
Dept: NEUROLOGY | Age: 26
End: 2021-09-24
Payer: COMMERCIAL

## 2021-09-24 DIAGNOSIS — F84.0 AUTISM: ICD-10-CM

## 2021-09-24 DIAGNOSIS — F32.A ANXIETY AND DEPRESSION: ICD-10-CM

## 2021-09-24 DIAGNOSIS — R41.840 INATTENTION: ICD-10-CM

## 2021-09-24 DIAGNOSIS — F41.9 ANXIETY AND DEPRESSION: ICD-10-CM

## 2021-09-24 DIAGNOSIS — F20.3 UNDIFFERENTIATED SCHIZOPHRENIA (HCC): Primary | ICD-10-CM

## 2021-09-24 DIAGNOSIS — Z86.59 HISTORY OF ADHD: ICD-10-CM

## 2021-09-24 PROCEDURE — 90791 PSYCH DIAGNOSTIC EVALUATION: CPT | Performed by: CLINICAL NEUROPSYCHOLOGIST

## 2021-09-24 NOTE — PROGRESS NOTES
[address] [facility],[address]  [address]   [address] [place] [address] [place] [address] [address]   [phone] Office   [phone] Fax      Neuropsychology    Initial Diagnostic Interview Note      Referral:  Katherine Ga MD    Marie Guajardo is a 32 y.o. right handed single  male who was accompanied by his mother to the initial clinical interview on 9/24/21. Please refer to his medical records for details pertaining to his history. At the start of the appointment, I reviewed the patient's [facility] Epic Chart (including Media scanned in from previous providers) for the active Problem List, all pertinent Past Medical Hx, medications, recent radiologic and laboratory findings. In addition, I reviewed pt's documented Immunization Record and Encounter History. He completed the 12th grade and received special education services in school. He was diagnosed initially with ADHD. Concerns have been raised for autism, and there are also concerns for schizophrenia versus schizoaffective disorder. He went to the hospital recently stating that he was hearing voices telling him to kill himself. In 2000, he started receiving services in school, this was in . Initially told ADHD. He had been in speech and language therapy. He was in the process (in the hospital) of being in between group homes and was homeless. The voices were telling him to get a gun. He stated that his mother had kicked him out of the house the day prior. He had been hospitalized before in 2020 at [facility]. Services through [facility] and is in therapy weekly with Iban Gomez at [facility]. Admission to [facility] in 2017. He was at [facility] at age 16. He is on Abilify, buspirone, seroquel, cogentin, zoloft 50 mg      Lives in a group home. There has been some drama there.       Neuropsychological Mental Status Exam (NMSE):      Historian: Good  Praxis: No UE apraxia  R/L Orientation: Intact to self and to other  Dress: within normal limits   Weight: within normal limits   Appearance/Hygiene: within normal limits   Gait: within normal limits   Assistive Devices: Glasses, hearing aids  Mood: within normal limits   Affect: within normal limits   Comprehension: within normal limits   Thought Process: within normal limits   Expressive Language: within normal limits   Receptive Language: within normal limits   Motor:  No cognitive or motor perseveration  ETOH: Denied  Tobacco: Denied  Illicit: Denied  SI/HI: Denied current. Voices have told him to kill himself. Denied HI. Psychosis: Hearing voices, as noted above. He is also seeing people that are not there. Voices threaten him. Death threats. He sees what look like people that only he can see. Insight: Within normal limits  Judgment: Within normal limits  Other Psych:      Past Medical History:   Diagnosis Date    Asperger syndrome     Schizophrenia ([facility] [address]     Scoliosis        Past Surgical History:   Procedure Laterality Date    HX CIRCUMCISION  1995    HX HERNIA REPAIR      as an infant     HX HERNIA REPAIR Bilateral 09/1995       Allergies   Allergen Reactions    Aspirin Hives       Family History   Problem Relation Age of Onset    No Known Problems Mother     No Known Problems Father        Social History     Tobacco Use    Smoking status: Never Smoker    Smokeless tobacco: Never Used   Vaping Use    Vaping Use: Never used   Substance Use Topics    Alcohol use: Never    Drug use: Never       Current Outpatient Medications   Medication Sig Dispense Refill    hydrOXYzine pamoate (VISTARIL) 50 mg capsule Take 1 Cap by mouth every eight (8) hours as needed for Anxiety. Indications: anxious 90 Cap 0    ARIPiprazole (ABILIFY) 20 mg tablet Take 1 Tab by mouth nightly.  Indications: schizophrenia 30 Tab 0    benztropine (COGENTIN) 1 mg tablet Take 1 Tab by mouth two (2) times a day. Indications: extrapyramidal symptoms as a result of taking the medication 60 Tab 0    ARIPiprazole (ABILIFY) 10 mg tablet Take 1 Tab by mouth daily. Indications: schizophrenia 30 Tab 0    busPIRone (BUSPAR) 10 mg tablet Take 1 Tab by mouth three (3) times daily. Indications: repeated episodes of anxiety 30 Tab 0    loratadine (CLARITIN) 10 mg tablet Take 1 Tab by mouth daily. Indications: inflammation of the nose due to an allergy 30 Tab 0    sertraline (ZOLOFT) 50 mg tablet Take 1 Tab by mouth daily. Indications: repeated episodes of anxiety, depression 30 Tab 0    melatonin 5 mg cap capsule Take 1 Cap by mouth nightly. Indications: insomnia 30 Cap 0    QUEtiapine (SEROquel) 300 mg tablet Take 1 Tab by mouth nightly. Indications: schizophrenia, insomnia 30 Tab 0    QUEtiapine (SEROquel) 50 mg tablet Take 1 Tab by mouth daily as needed (psychosis). Indications: bipolar depression 30 Tab 0         Plan:  Obtain authorization for testing from [place] [employer] [facility]. Report to follow once testing, scoring, and interpretation completed. ? Organic based neurocognitive issues versus mood disorder or combination of same. ? Problems organic, functional, or both? This note will not be viewable in [address]

## 2021-10-11 ENCOUNTER — OFFICE VISIT (OUTPATIENT)
Dept: FAMILY MEDICINE CLINIC | Age: 26
End: 2021-10-11
Payer: COMMERCIAL

## 2021-10-11 VITALS
DIASTOLIC BLOOD PRESSURE: 51 MMHG | BODY MASS INDEX: 23.25 KG/M2 | OXYGEN SATURATION: 97 % | HEIGHT: 75 IN | TEMPERATURE: 98.3 F | HEART RATE: 82 BPM | WEIGHT: 187 LBS | RESPIRATION RATE: 16 BRPM | SYSTOLIC BLOOD PRESSURE: 106 MMHG

## 2021-10-11 DIAGNOSIS — F20.3 UNDIFFERENTIATED SCHIZOPHRENIA (HCC): ICD-10-CM

## 2021-10-11 DIAGNOSIS — Z23 ENCOUNTER FOR IMMUNIZATION: ICD-10-CM

## 2021-10-11 DIAGNOSIS — Z76.89 ENCOUNTER TO ESTABLISH CARE WITH NEW DOCTOR: Primary | ICD-10-CM

## 2021-10-11 PROCEDURE — 90471 IMMUNIZATION ADMIN: CPT | Performed by: NURSE PRACTITIONER

## 2021-10-11 PROCEDURE — 90686 IIV4 VACC NO PRSV 0.5 ML IM: CPT | Performed by: NURSE PRACTITIONER

## 2021-10-11 PROCEDURE — 99203 OFFICE O/P NEW LOW 30 MIN: CPT | Performed by: NURSE PRACTITIONER

## 2021-10-11 NOTE — PROGRESS NOTES
Chief Complaint   Patient presents with    New Patient        1. \"Have you been to the ER, urgent care clinic since your last visit? Hospitalized since your last visit? \" No    2. \"Have you seen or consulted any other health care providers outside of the [address] [place] [address] since your last visit? \" No     3. For patients aged 39-70: Has the patient had a colonoscopy? No     If the patient is female:    4. For patients aged 41-77: Has the patient had a mammogram within the past 2 years? No    5. For patients aged 21-30: Has the patient had a pap smear? No    3 most recent PHQ Screens 1/14/2021   Little interest or pleasure in doing things Not at all   Feeling down, depressed, irritable, or hopeless Not at all   Total Score PHQ 2 0   Some encounter information is confidential and restricted. Go to Review Flowsheets activity to see all data.

## 2021-10-11 NOTE — PROGRESS NOTES
HPI:   Kina Boucher is a 32 y.o. male who presents to establish care. He is followed by Dr. Hieu Nieves, psychiatry, with no recent adjustments. He is accompanied by his mother. He resides in a group home. He is interested in establishing with a new psychiatrist.    He continues to suffer from auditory hallucinations. Current Outpatient Medications   Medication Sig Dispense Refill    hydrOXYzine pamoate (VISTARIL) 50 mg capsule Take 1 Cap by mouth every eight (8) hours as needed for Anxiety. Indications: anxious 90 Cap 0    ARIPiprazole (ABILIFY) 20 mg tablet Take 1 Tab by mouth nightly. Indications: schizophrenia 30 Tab 0    benztropine (COGENTIN) 1 mg tablet Take 1 Tab by mouth two (2) times a day. Indications: extrapyramidal symptoms as a result of taking the medication 60 Tab 0    ARIPiprazole (ABILIFY) 10 mg tablet Take 1 Tab by mouth daily. Indications: schizophrenia 30 Tab 0    busPIRone (BUSPAR) 10 mg tablet Take 1 Tab by mouth three (3) times daily. Indications: repeated episodes of anxiety 30 Tab 0    sertraline (ZOLOFT) 50 mg tablet Take 1 Tab by mouth daily. Indications: repeated episodes of anxiety, depression 30 Tab 0    QUEtiapine (SEROquel) 300 mg tablet Take 1 Tab by mouth nightly. Indications: schizophrenia, insomnia 30 Tab 0      Allergies   Allergen Reactions    Aspirin Hives      Patient Active Problem List   Diagnosis Code    Undifferentiated schizophrenia ([facility] [address] F20.3    Jaw pain R68.84    Right groin pain R10.31    Autism F84.0    Schizophrenia ([facility] [address] F20.9     Past Medical History:   Diagnosis Date    Asperger syndrome     Schizophrenia ([facility] [address]     Scoliosis       Past Surgical History:   Procedure Laterality Date    HX CIRCUMCISION  1995    HX HERNIA REPAIR      as an infant     HX HERNIA REPAIR Bilateral 09/1995      No LMP for male patient.    Family History   Problem Relation Age of Onset    No Known Problems Mother     No Known Problems Father Social History     Socioeconomic History    Marital status: SINGLE     Spouse name: Not on file    Number of children: Not on file    Years of education: Not on file    Highest education level: Not on file   Occupational History    Not on file   Tobacco Use    Smoking status: Never Smoker    Smokeless tobacco: Never Used   Vaping Use    Vaping Use: Never used   Substance and Sexual Activity    Alcohol use: Never    Drug use: Never    Sexual activity: Never   Other Topics Concern    Not on file   Social History Narrative    Lives with mom    Goes to day program     Social Determinants of Health     Financial Resource Strain:     Difficulty of Paying Living Expenses:    Food Insecurity:     Worried About Running Out of Food in the Last Year:     [address] in the Last Year:    Transportation Needs:     Lack of Transportation (Medical):  Lack of Transportation (Non-Medical):    Physical Activity:     Days of Exercise per Week:     Minutes of Exercise per Session:    Stress:     Feeling of Stress :    Social Connections:     Frequency of Communication with Friends and Family:     Frequency of Social Gatherings with Friends and Family:     Attends Alevism Services:     Active Member of Clubs or Organizations:     Attends Club or Organization Meetings:     Marital Status:    Intimate Partner Violence:     Fear of Current or Ex-Partner:     Emotionally Abused:     Physically Abused:     Sexually Abused:         ROS:   Review of Systems   Constitutional: Negative for fever, malaise/fatigue and weight loss. HENT: Negative for hearing loss. Eyes: Negative for blurred vision and pain. Respiratory: Negative for cough and shortness of breath. Cardiovascular: Negative for chest pain, palpitations and leg swelling. Gastrointestinal: Negative for abdominal pain, blood in stool, constipation, diarrhea and melena. Genitourinary: Negative for dysuria and hematuria. Musculoskeletal: Negative for joint pain. Skin: Negative for rash. Neurological: Negative for headaches. Psychiatric/Behavioral: Positive for hallucinations. Negative for depression. The patient is nervous/anxious. The patient does not have insomnia. Physical Exam:     Visit Vitals  BP (!) 106/51   Pulse 82   Temp 98.3 °F (36.8 °C)   Resp 16   Ht 6' 3\" (1.905 m)   Wt 187 lb (84.8 kg)   SpO2 97%   BMI 23.37 kg/m²        Vitals and Nurse Documentation reviewed. Physical Exam  Constitutional:       General: He is not in acute distress. HENT:      Right Ear: No drainage. No middle ear effusion. Tympanic membrane is not injected, erythematous or bulging. Left Ear: No drainage. No middle ear effusion. Tympanic membrane is not injected, erythematous or bulging. Eyes:      Pupils: Pupils are equal, round, and reactive to light. Neck:      Trachea: No tracheal deviation. Cardiovascular:      Pulses:           Dorsalis pedis pulses are 2+ on the right side and 2+ on the left side. Posterior tibial pulses are 2+ on the right side and 2+ on the left side. Heart sounds: S1 normal and S2 normal. No murmur heard. No friction rub. No gallop. Pulmonary:      Breath sounds: Normal breath sounds. No wheezing. Abdominal:      General: Bowel sounds are normal. There is no distension. Palpations: Abdomen is soft. There is no mass. Tenderness: There is no abdominal tenderness. Lymphadenopathy:      Cervical: No cervical adenopathy. Skin:     General: Skin is warm and dry. Neurological:      Cranial Nerves: No cranial nerve deficit. Sensory: Sensation is intact. Motor: Motor function is intact. Assessment/ Plan:   [employer] were discussed including hours of operation, on-call providers, and MyChart. Diagnoses and all orders for this visit:    1. Encounter to establish care with new doctor    2.  Encounter for immunization  -     INFLUENZA VIRUS VAC QUAD,SPLIT,PRESV FREE SYRINGE IM    3. Undifferentiated schizophrenia ([facility] [address]      Follow-up and Dispositions    · Return if symptoms worsen or fail to improve. Will assist in getting list of preferred psychiatrists.

## 2021-12-03 ENCOUNTER — OFFICE VISIT (OUTPATIENT)
Dept: NEUROLOGY | Age: 26
End: 2021-12-03
Payer: COMMERCIAL

## 2021-12-03 DIAGNOSIS — F25.0 SCHIZOAFFECTIVE DISORDER, BIPOLAR TYPE (HCC): Primary | ICD-10-CM

## 2021-12-03 DIAGNOSIS — F45.0 SOMATIZATION DISORDER: ICD-10-CM

## 2021-12-03 DIAGNOSIS — F43.10 PTSD (POST-TRAUMATIC STRESS DISORDER): ICD-10-CM

## 2021-12-03 DIAGNOSIS — F84.0 AUTISM SPECTRUM: ICD-10-CM

## 2021-12-03 DIAGNOSIS — F41.9 SEVERE ANXIETY: ICD-10-CM

## 2021-12-03 DIAGNOSIS — F90.0 ATTENTION DEFICIT HYPERACTIVITY DISORDER (ADHD), INATTENTIVE TYPE, SEVERE: ICD-10-CM

## 2021-12-03 DIAGNOSIS — F32.2 SEVERE MAJOR DEPRESSION (HCC): ICD-10-CM

## 2021-12-03 PROCEDURE — 96139 PSYCL/NRPSYC TST TECH EA: CPT | Performed by: CLINICAL NEUROPSYCHOLOGIST

## 2021-12-03 PROCEDURE — 96136 PSYCL/NRPSYC TST PHY/QHP 1ST: CPT | Performed by: CLINICAL NEUROPSYCHOLOGIST

## 2021-12-03 PROCEDURE — 96137 PSYCL/NRPSYC TST PHY/QHP EA: CPT | Performed by: CLINICAL NEUROPSYCHOLOGIST

## 2021-12-03 PROCEDURE — 96138 PSYCL/NRPSYC TECH 1ST: CPT | Performed by: CLINICAL NEUROPSYCHOLOGIST

## 2021-12-03 PROCEDURE — 96130 PSYCL TST EVAL PHYS/QHP 1ST: CPT | Performed by: CLINICAL NEUROPSYCHOLOGIST

## 2021-12-03 PROCEDURE — 96131 PSYCL TST EVAL PHYS/QHP EA: CPT | Performed by: CLINICAL NEUROPSYCHOLOGIST

## 2021-12-03 NOTE — LETTER
12/6/2021    Patient: Malcom Mtz   YOB: 1995   Date of Visit: 12/3/2021     [facility] [id number]  [address] [place] [address] 5  [facility] [phone] [id number]  Via Fax: [phone]    Dear MARISA Limon MD,      Thank you for referring Mr. Malcom Mtz to [facility] for evaluation. My notes for this consultation are attached. If you have questions, please do not hesitate to call me. I look forward to following your patient along with you.       Sincerely,    Osmar Maya PsyD

## 2021-12-06 NOTE — PROGRESS NOTES
[address] [facility],5Th [address]  [address]   [address] [place] [address] Count includes the [facility] [place] [address] [address]   [phone] Office   [phone] Fax      Psychological Evaluation Report      Referral:  Andrea Chen NP    Estefanía Joseph is a 32 y.o. right handed single  male who was accompanied by his mother to the initial clinical interview on 9/24/21. Please refer to his medical records for details pertaining to his history. At the start of the appointment, I reviewed the patient's [facility] Epic Chart (including Media scanned in from previous providers) for the active Problem List, all pertinent Past Medical Hx, medications, recent radiologic and laboratory findings. In addition, I reviewed pt's documented Immunization Record and Encounter History. He completed the 12th grade and received special education services in school. He was diagnosed initially with ADHD. Concerns have been raised for autism, and there are also concerns for schizophrenia versus schizoaffective disorder. He went to the hospital recently stating that he was hearing voices telling him to kill himself. In 2000, he started receiving services in school, this was in . Initially told ADHD. He had been in speech and language therapy. He was in the process (in the hospital) of being in between group homes and was homeless. The voices were telling him to get a gun. He stated that his mother had kicked him out of the house the day prior. He had been hospitalized before in 2020 at [facility]. Services through [facility] and is in therapy weekly with Edna Lee at [facility]. Admission to [facility] in 2017. He was at [facility] at age 16. He is on Abilify, buspirone, seroquel, cogentin, zoloft 50 mg      Lives in a group home. There has been some drama there.       Neuropsychological Mental Status Exam (NMSE):      Historian: Good  Praxis: No UE apraxia  R/L Orientation: Intact to self and to other  Dress: within normal limits   Weight: within normal limits   Appearance/Hygiene: within normal limits   Gait: within normal limits   Assistive Devices: Glasses, hearing aids  Mood: within normal limits   Affect: within normal limits   Comprehension: within normal limits   Thought Process: within normal limits   Expressive Language: within normal limits   Receptive Language: within normal limits   Motor:  No cognitive or motor perseveration  ETOH: Denied  Tobacco: Denied  Illicit: Denied  SI/HI: Denied current. Voices have told him to kill himself. Denied HI. Psychosis: Hearing voices, as noted above. He is also seeing people that are not there. Voices threaten him. Death threats. He sees what look like people that only he can see. Insight: Within normal limits  Judgment: Within normal limits  Other Psych:      Past Medical History:   Diagnosis Date    Asperger syndrome     Schizophrenia ([facility] [address]     Scoliosis        Past Surgical History:   Procedure Laterality Date    HX CIRCUMCISION  1995    HX HERNIA REPAIR      as an infant     HX HERNIA REPAIR Bilateral 09/1995       Allergies   Allergen Reactions    Aspirin Hives       Family History   Problem Relation Age of Onset    No Known Problems Mother     No Known Problems Father        Social History     Tobacco Use    Smoking status: Never Smoker    Smokeless tobacco: Never Used   Vaping Use    Vaping Use: Never used   Substance Use Topics    Alcohol use: Never    Drug use: Never       Current Outpatient Medications   Medication Sig Dispense Refill    hydrOXYzine pamoate (VISTARIL) 50 mg capsule Take 1 Cap by mouth every eight (8) hours as needed for Anxiety. Indications: anxious 90 Cap 0    ARIPiprazole (ABILIFY) 20 mg tablet Take 1 Tab by mouth nightly. Indications: schizophrenia 30 Tab 0    benztropine (COGENTIN) 1 mg tablet Take 1 Tab by mouth two (2) times a day. Indications: extrapyramidal symptoms as a result of taking the medication 60 Tab 0    ARIPiprazole (ABILIFY) 10 mg tablet Take 1 Tab by mouth daily. Indications: schizophrenia 30 Tab 0    busPIRone (BUSPAR) 10 mg tablet Take 1 Tab by mouth three (3) times daily. Indications: repeated episodes of anxiety 30 Tab 0    sertraline (ZOLOFT) 50 mg tablet Take 1 Tab by mouth daily. Indications: repeated episodes of anxiety, depression 30 Tab 0    QUEtiapine (SEROquel) 300 mg tablet Take 1 Tab by mouth nightly. Indications: schizophrenia, insomnia 30 Tab 0     Plan:  Obtain authorization for testing from [facility]. Report to follow once testing, scoring, and interpretation completed. ? Organic based neurocognitive issues versus mood disorder or combination of same. ? Problems organic, functional, or both? This note will not be viewable in [address] Psychological Evaluation  Patient Testing 12/3/21 Report Completed 12/6/21  A Psychometrist Assisted w/ portions of this evaluation while under my direct  supervision    Neuropsychologist Administered, Interpreted, & Reported: Neuropsychological Mental Status Exam, Revised Memory & Behavior Checklist, MMSE, Clock Drawing, Test Of Premorbid Functioning, [facility] Adult ADD Scales, Yao-Melzack Pain Questionnaire, History Taking  & Clinical Interview With The Patient*, Review Of Available Records, DARCY, CP, SRS-2, GARS-3,     Psychometrist Administered & Neuropsychologist Interpreted & Neuropsychologist Reported:  Paced Serial Addition Test, Wechsler Adult Intelligence Scale  IV, Verbal Fluency Tests, [facility] Derik Revised, Trailmaking Test Parts A & B, Buschke Selective Reminding Test, Vargas Complex Figure Test, Welch Depression Inventory  II, Welch Anxiety Inventory, VMI-6. Test Findings:  Note:  The patients raw data have been compared with currently available norms which include demographic corrections for age, gender, and/or education.   Sometimes, the patients scores are compared to demographically similar individuals as close to the patients age, education level, etc., as possible. \"Average\" is viewed as being +/- 1 standard deviation (SD) from the stated mean for a particular test score. \"Low average\" is viewed as being between 1 and 2 SD below the mean, and above average is viewed as being 1 and 2 SD above the mean. Scores falling in the borderline range (between 1-1/2 and 2 SD below the mean) are viewed with particular attention as to whether they are normal or abnormal neurocognitive test scores. Other methods of inference in analyzing the test data are also utilized, including the pattern and range of scores in the profile, bilateral motor functions, and the presence, if any, of pathognomonic signs. Behaviorally, the patient was friendly and cooperative and appeared motivated to perform well during this examination. Within this context, the results of this evaluation are viewed as a valid reflection of the patients actual neurocognitive and emotional status. The patient was administered the Rosa' Continuous Performance Test-III and review of the subscales within this instrument revealed severe concern for inattentiveness without concern for impulsivity. Verbal auditory attention (T = 48) and nonverbal auditory attention and discrimination (T = 55) were both within the normal range. High level auditory information processing speed, as assessed by the PASAT, was within the normal range for Trial 2 (- 0.14 SD). This pattern of performance is indicative of a patient who is at increased risk for day-to-day problems with sustained visual attention. Auditory attention and discrimination related abilities and high level auditory information processing speed related abilities were normal.  .       The patient was administered the Wechsler Adult Intelligence Scale - IV.   See Appendix I (in media section of this EMR) for full breakdown of IQ scores. There was no clinically significant difference between his borderline range Working Memory Index score of 74 (4th %ile) and his extremely low  range Processing Speed Index score of 68 (2nd %ile). His Verbal Comprehension Index score of 89 was low average. His Perceptual Reasoning Index score of 73 (4th %ile) was borderline. Day-to-day problems with perceptual reasoning, working memory, and processing speed can be expected. Those scores are lower than expected based on his performance on a task assessing premorbid functioning levels. The patient was administered the Buschke Selective Reminding Test and his basic learning and memory (87/144) was impaired. In this regard, his consistency related long term retrieval was impaired (31141 correct), as was his Long Term Storage (01/032). His discrepancy score ( + 34 points) is clinically significant and is suggestive of a high level cognitive organization problem and/or high level attention concern. He is also showing general problems with auditory learning. Motor coordination was low average (18th %Ile) and his visual perception (42nd %ile) was normal on the [facility] VMI-6. Simple timed visual motor sequencing (Trailmaking Test Part A) was within the mildly to moderately impaired range with a T score of 32. The patient's performance on a similar, but more complex task of timed visual motor sequencing (Trailmaking Test Part B) was within the average range with a T score of 45. The patient made zero sequencing errors on this latter test.  Taken together, this pattern of performance is not indicative of a patient who is at increased risk for day-to-day problems with frontal lobe-mediated executive functioning abilities. The patient rated his current level of pain as \"0/5- No Pain\" on the Yao-Melzack Pain Questionnaire. His Welch Depression Inventory - II score of 18 was within the mildly depressed range.    His Andrade Din Anxiety Inventory score of 33 reflected severe anxiety. The patient was also administered the Personality Assessment Inventory and generated a valid profile for interpretation, though a \"cry for help\" pattern responding is noted. Within this context, the configuration of the scales suggests a person with prominent distress, ruminative worry, and thinking and concentration problems. His social judgment is poor and he is tense and pessimistic about what the future might hold. It is likely that he experiences unusual perceptual or sensory events including full-blown hallucinations as well as unusual ideas that include magical thinking or delusional thinking. Maladaptive behavior patterns aimed at controlling anxiety are present. There is strong support for PTSD. His life is severely constricted by his tension and numerous somatic symptoms of anxiety are present. There is strong evidence of violeta. He has inflated self-esteem and grandiosity and he meets diagnostic criteria for a manic episode. He is likely to be quite emotionally labile, manifesting rapid and extreme mood swings along with episodes of poorly controlled anger. Marked depression is present. He is quite suspicious and hostile towards others and has particular difficulties interpreting the normal nuances of interpersonal behavior the provide meaning to relationships. Self-concept is poorly established and self-esteem is fragile and will plummet in response to slights or oversights by other people. Interpersonally, he has self-effacing and lacks confidence. Notable day-to-day stress and turmoil is reported. He reports recurrent thoughts of suicide on this measure and denied currently active plan or intent with me. He is potentially prone to more extreme displays of anger. He is highly motivated for treatment.   However, the nature, severity, and complexity of these issues suggests the treatment will be difficult, with a lengthy process and a higher probability of reversals. Any impasse will need to be handled with particular care. Mother completed the [place] and the ABAS-3. Overall adaptive behavior score is 69. Marked problems with communication, daily living, and socialization are noted. Mother reports marked problems with the patient's general adaptive skills (SS = 53), conceptual skills (SS = 55), social adaptive skills (SS = 61), and practical adaptive skills (SS = 55). The patient's scores on the Social Responsiveness Scale -2 (T= 77) and the [place] Autism Rating Scale-4 (AI = 104). Scores are viewed as valid and are commensurate with a clinical diagnosis of an autism spectrum issue. Problems with emotional responses, cognitive style, maladaptive speech, social awareness, social cognition, social communication, social motivation, and restricted interest/repetitive behaviors are noted. Impressions and Recommendations:  From the actual neurocognitive profile, there is support for a diagnosis of inattentive ADHD. It is a severe problem. He is also showing problems with learning. IQ scores vary from the extremely low to low average range of functioning. Problems with high-level cognitive organization are noted. Executive functioning abilities are normal.  From an emotional standpoint, there is can support for schizoaffective disorder, bipolar type, severe anxiety with somatization, PTSD. Borderline personality traits are also seen. There is also strong neurocognitive evidence of autism spectrum disorder. In addition to continued medical care, my recommendations include a review of his current psychiatric medication management for schizoaffective disorder, bipolar type, severe anxiety, severe major depression, and attention deficit. Caution is advised in selecting an appropriate medication for attention for him, given the propensity towards anxiety here.   Active engagement in intensive psychotherapy is strongly advised. Consider EMDR. Consider DBT. Any case management and social work with us on daily basis. Consult with Department of aging and rehabilitative services. Consult with OT. Consider RUPERTO services if available as an adult. Monitor for any escalation in currently passive suicidal thinking. Baseline now established. Unfortunately, these are likely chronic issues and his prognosis is guarded at best.  With treatment, though, some symptom improvement is expected. Clinical correlation is, of course, indicated. I will discuss these findings with the patient when he follows up with me in the near future. Follow up prn. DIAGNOSES: Schizoaffective Disorder, Bipolar Type    Severe Anxiety    Somatization    PTSD    Borderline Personality in Adult    Autism Spectrum Disorder, Level 1    ADHD, Inattentive    Severe Major Depression     The above information is based upon information currently available to me. If there is any additional information of which I am currently unaware, I would be more than happy to review it upon having it made available to me. Thank you for the opportunity to see this interesting individual.     Sincerely,       Alexandrea Bales.  Jose Quick, EdS,LCP    Appendix: IQ Test results (see media section of this EMR)    Cc: Rudy Meza, MARISA       Time Documentation:      81982 x 1 21486*2 Test administration/data gathering by Neuropsychologist (see above), 60 minutes  96138 x 1 Test administration, data gathering by technician (1st 30 minutes), 30 minutes  96139 x 5 Test administration, data gathering by technician (each additional 30 minutes), 3 hours (total tech 3 hours)   96130 x 1 Testing Evaluation Services By Neuropsychologist, 1st hour  25905 x 1 Testing Evaluation Services by Neuropsychologist, 2nd hour (45 minutes)  This includes review of referral question, reviewing records, planning test battery (50 minutes prior to testing date), and interpreting data (30 minutes), and interpretation and report writing (50 minutes)       Anticipated Integrated Feedback (52965) - Service to be completed on a future date and not currently billed. The above includes: Record review. Review of history provided by patient. Review of collaborative information. Testing by Clinician. Review of raw data. Scoring. Report writing of individual tests administered by Clinician. Integration of individual tests administered by psychometrist with NSE/testing by clinician, review of records/history/collaborative information, case Conceptualization, treatment planning, clinical decision making, report writing, coordination Of Care. Psychometry test codes as time spent by psychometrist administering and scoring neurocognitive/psychological tests under supervision of neuropsychologist.  Integral services including scoring of raw data, data interpretation, case conceptualization, report writing etcetera were initiated after the patient finished testing/raw data collected and was completed on the date the report was signed.

## 2021-12-07 ENCOUNTER — TELEPHONE (OUTPATIENT)
Dept: NEUROLOGY | Age: 26
End: 2021-12-07

## 2021-12-07 NOTE — TELEPHONE ENCOUNTER
Patient's mother called asking if she could come in herself to  patients test results. She is on the PHI, however, I was unsure the protocol with this specific testing. I informed mother that we would possibly need to call her son to ok this if it were possible. If the patient needs to  the results transportation will need to be set up by his mother.      Thank you

## 2022-01-02 ENCOUNTER — HOSPITAL ENCOUNTER (OUTPATIENT)
Age: 27
Setting detail: OBSERVATION
Discharge: HOME | End: 2022-01-06
Attending: EMERGENCY MEDICINE | Admitting: INTERNAL MEDICINE
Payer: MEDICAID

## 2022-01-02 DIAGNOSIS — R45.851 SUICIDAL IDEATION: Primary | ICD-10-CM

## 2022-01-02 DIAGNOSIS — U07.1 COVID-19: ICD-10-CM

## 2022-01-02 LAB
ALBUMIN SERPL-MCNC: 4.2 G/DL (ref 3.5–5)
ALBUMIN/GLOB SERPL: 1.1 {RATIO} (ref 1.1–2.2)
ALP SERPL-CCNC: 102 U/L (ref 45–117)
ALT SERPL-CCNC: 27 U/L (ref 12–78)
AMPHET UR QL SCN: NEGATIVE
ANION GAP SERPL CALC-SCNC: 6 MMOL/L (ref 5–15)
APPEARANCE UR: CLEAR
AST SERPL-CCNC: 20 U/L (ref 15–37)
BACTERIA URNS QL MICRO: NEGATIVE /HPF
BARBITURATES UR QL SCN: NEGATIVE
BASOPHILS # BLD: 0 K/UL (ref 0–0.1)
BASOPHILS NFR BLD: 0 % (ref 0–1)
BENZODIAZ UR QL: NEGATIVE
BILIRUB SERPL-MCNC: 0.5 MG/DL (ref 0.2–1)
BILIRUB UR QL: NEGATIVE
BUN SERPL-MCNC: 16 MG/DL (ref 6–20)
BUN/CREAT SERPL: 12 (ref 12–20)
CALCIUM SERPL-MCNC: 9.3 MG/DL (ref 8.5–10.1)
CANNABINOIDS UR QL SCN: NEGATIVE
CHLORIDE SERPL-SCNC: 104 MMOL/L (ref 97–108)
CO2 SERPL-SCNC: 30 MMOL/L (ref 21–32)
COCAINE UR QL SCN: NEGATIVE
COLOR UR: ABNORMAL
CREAT SERPL-MCNC: 1.3 MG/DL (ref 0.7–1.3)
DIFFERENTIAL METHOD BLD: NORMAL
DRUG SCRN COMMENT,DRGCM: NORMAL
EOSINOPHIL # BLD: 0 K/UL (ref 0–0.4)
EOSINOPHIL NFR BLD: 0 % (ref 0–7)
EPITH CASTS URNS QL MICRO: ABNORMAL /LPF
ERYTHROCYTE [DISTWIDTH] IN BLOOD BY AUTOMATED COUNT: 11.9 % (ref 11.5–14.5)
ETHANOL SERPL-MCNC: <10 MG/DL
FLUAV RNA SPEC QL NAA+PROBE: NOT DETECTED
FLUBV RNA SPEC QL NAA+PROBE: NOT DETECTED
GLOBULIN SER CALC-MCNC: 3.8 G/DL (ref 2–4)
GLUCOSE SERPL-MCNC: 86 MG/DL (ref 65–100)
GLUCOSE UR STRIP.AUTO-MCNC: NEGATIVE MG/DL
HCT VFR BLD AUTO: 44.3 % (ref 36.6–50.3)
HGB BLD-MCNC: 14.7 G/DL (ref 12.1–17)
HGB UR QL STRIP: NEGATIVE
IMM GRANULOCYTES # BLD AUTO: 0 K/UL (ref 0–0.04)
IMM GRANULOCYTES NFR BLD AUTO: 0 % (ref 0–0.5)
KETONES UR QL STRIP.AUTO: ABNORMAL MG/DL
LEUKOCYTE ESTERASE UR QL STRIP.AUTO: NEGATIVE
LYMPHOCYTES # BLD: 1.8 K/UL (ref 0.8–3.5)
LYMPHOCYTES NFR BLD: 35 % (ref 12–49)
MCH RBC QN AUTO: 28.5 PG (ref 26–34)
MCHC RBC AUTO-ENTMCNC: 33.2 G/DL (ref 30–36.5)
MCV RBC AUTO: 85.9 FL (ref 80–99)
METHADONE UR QL: NEGATIVE
MONOCYTES # BLD: 0.4 K/UL (ref 0–1)
MONOCYTES NFR BLD: 7 % (ref 5–13)
MUCOUS THREADS URNS QL MICRO: ABNORMAL /LPF
NEUTS SEG # BLD: 2.9 K/UL (ref 1.8–8)
NEUTS SEG NFR BLD: 58 % (ref 32–75)
NITRITE UR QL STRIP.AUTO: NEGATIVE
NRBC # BLD: 0 K/UL (ref 0–0.01)
NRBC BLD-RTO: 0 PER 100 WBC
OPIATES UR QL: NEGATIVE
PCP UR QL: NEGATIVE
PH UR STRIP: 6 [PH] (ref 5–8)
PLATELET # BLD AUTO: 291 K/UL (ref 150–400)
PMV BLD AUTO: 8.9 FL (ref 8.9–12.9)
POTASSIUM SERPL-SCNC: 4.1 MMOL/L (ref 3.5–5.1)
PROT SERPL-MCNC: 8 G/DL (ref 6.4–8.2)
PROT UR STRIP-MCNC: NEGATIVE MG/DL
RBC # BLD AUTO: 5.16 M/UL (ref 4.1–5.7)
RBC #/AREA URNS HPF: ABNORMAL /HPF (ref 0–5)
SARS-COV-2, COV2: DETECTED
SODIUM SERPL-SCNC: 140 MMOL/L (ref 136–145)
SP GR UR REFRACTOMETRY: 1.02 (ref 1–1.03)
UA: UC IF INDICATED,UAUC: ABNORMAL
UROBILINOGEN UR QL STRIP.AUTO: 0.2 EU/DL (ref 0.2–1)
WBC # BLD AUTO: 5.1 K/UL (ref 4.1–11.1)
WBC URNS QL MICRO: ABNORMAL /HPF (ref 0–4)

## 2022-01-02 PROCEDURE — 99284 EMERGENCY DEPT VISIT MOD MDM: CPT

## 2022-01-02 PROCEDURE — 80053 COMPREHEN METABOLIC PANEL: CPT

## 2022-01-02 PROCEDURE — 87636 SARSCOV2 & INF A&B AMP PRB: CPT

## 2022-01-02 PROCEDURE — 82077 ASSAY SPEC XCP UR&BREATH IA: CPT

## 2022-01-02 PROCEDURE — 81001 URINALYSIS AUTO W/SCOPE: CPT

## 2022-01-02 PROCEDURE — 80307 DRUG TEST PRSMV CHEM ANLYZR: CPT

## 2022-01-02 PROCEDURE — 85025 COMPLETE CBC W/AUTO DIFF WBC: CPT

## 2022-01-02 PROCEDURE — 90791 PSYCH DIAGNOSTIC EVALUATION: CPT

## 2022-01-02 PROCEDURE — 36415 COLL VENOUS BLD VENIPUNCTURE: CPT

## 2022-01-03 PROBLEM — U07.1 COVID-19: Status: ACTIVE | Noted: 2022-01-03

## 2022-01-03 PROCEDURE — G0378 HOSPITAL OBSERVATION PER HR: HCPCS

## 2022-01-03 PROCEDURE — 74011250637 HC RX REV CODE- 250/637: Performed by: INTERNAL MEDICINE

## 2022-01-03 RX ORDER — ZINC SULFATE 50(220)MG
1 CAPSULE ORAL DAILY
Status: DISCONTINUED | OUTPATIENT
Start: 2022-01-04 | End: 2022-01-06 | Stop reason: HOSPADM

## 2022-01-03 RX ORDER — SODIUM CHLORIDE 0.9 % (FLUSH) 0.9 %
5-40 SYRINGE (ML) INJECTION AS NEEDED
Status: DISCONTINUED | OUTPATIENT
Start: 2022-01-03 | End: 2022-01-04

## 2022-01-03 RX ORDER — QUETIAPINE FUMARATE 50 MG/1
50 TABLET, FILM COATED ORAL
COMMUNITY
End: 2022-01-06

## 2022-01-03 RX ORDER — ONDANSETRON 2 MG/ML
4 INJECTION INTRAMUSCULAR; INTRAVENOUS
Status: DISCONTINUED | OUTPATIENT
Start: 2022-01-03 | End: 2022-01-06 | Stop reason: HOSPADM

## 2022-01-03 RX ORDER — BENZTROPINE MESYLATE 1 MG/1
1 TABLET ORAL 2 TIMES DAILY
Status: DISCONTINUED | OUTPATIENT
Start: 2022-01-03 | End: 2022-01-06 | Stop reason: HOSPADM

## 2022-01-03 RX ORDER — ONDANSETRON 4 MG/1
4 TABLET, ORALLY DISINTEGRATING ORAL
Status: DISCONTINUED | OUTPATIENT
Start: 2022-01-03 | End: 2022-01-06 | Stop reason: HOSPADM

## 2022-01-03 RX ORDER — QUETIAPINE FUMARATE 100 MG/1
300 TABLET, FILM COATED ORAL
Status: DISCONTINUED | OUTPATIENT
Start: 2022-01-03 | End: 2022-01-03

## 2022-01-03 RX ORDER — ARIPIPRAZOLE 5 MG/1
10 TABLET ORAL DAILY
Status: DISCONTINUED | OUTPATIENT
Start: 2022-01-04 | End: 2022-01-04

## 2022-01-03 RX ORDER — SERTRALINE HYDROCHLORIDE 50 MG/1
100 TABLET, FILM COATED ORAL DAILY
Status: DISCONTINUED | OUTPATIENT
Start: 2022-01-04 | End: 2022-01-06 | Stop reason: HOSPADM

## 2022-01-03 RX ORDER — ASCORBIC ACID 500 MG
1000 TABLET ORAL DAILY
Status: DISCONTINUED | OUTPATIENT
Start: 2022-01-04 | End: 2022-01-06 | Stop reason: HOSPADM

## 2022-01-03 RX ORDER — POLYETHYLENE GLYCOL 3350 17 G/17G
17 POWDER, FOR SOLUTION ORAL DAILY PRN
Status: DISCONTINUED | OUTPATIENT
Start: 2022-01-03 | End: 2022-01-06 | Stop reason: HOSPADM

## 2022-01-03 RX ORDER — SODIUM CHLORIDE 0.9 % (FLUSH) 0.9 %
5-40 SYRINGE (ML) INJECTION EVERY 8 HOURS
Status: DISCONTINUED | OUTPATIENT
Start: 2022-01-03 | End: 2022-01-04

## 2022-01-03 RX ORDER — SERTRALINE HYDROCHLORIDE 100 MG/1
100 TABLET, FILM COATED ORAL DAILY
Status: ON HOLD | COMMUNITY
End: 2022-01-05 | Stop reason: SDUPTHER

## 2022-01-03 RX ORDER — ACETAMINOPHEN 325 MG/1
650 TABLET ORAL
Status: DISCONTINUED | OUTPATIENT
Start: 2022-01-03 | End: 2022-01-06 | Stop reason: HOSPADM

## 2022-01-03 RX ORDER — ENOXAPARIN SODIUM 100 MG/ML
40 INJECTION SUBCUTANEOUS DAILY
Status: DISCONTINUED | OUTPATIENT
Start: 2022-01-03 | End: 2022-01-06 | Stop reason: HOSPADM

## 2022-01-03 RX ORDER — QUETIAPINE 150 MG/1
300 TABLET, FILM COATED, EXTENDED RELEASE ORAL
Status: DISCONTINUED | OUTPATIENT
Start: 2022-01-04 | End: 2022-01-04

## 2022-01-03 RX ORDER — FAMOTIDINE 20 MG/1
20 TABLET, FILM COATED ORAL 2 TIMES DAILY
Status: DISCONTINUED | OUTPATIENT
Start: 2022-01-04 | End: 2022-01-04

## 2022-01-03 RX ORDER — BENZTROPINE MESYLATE 1 MG/1
1 TABLET ORAL 2 TIMES DAILY
Status: DISCONTINUED | OUTPATIENT
Start: 2022-01-04 | End: 2022-01-03

## 2022-01-03 RX ORDER — BUSPIRONE HYDROCHLORIDE 15 MG/1
15 TABLET ORAL 3 TIMES DAILY
Status: ON HOLD | COMMUNITY
End: 2022-01-05 | Stop reason: SDUPTHER

## 2022-01-03 RX ORDER — MELATONIN
2000 DAILY
Status: DISCONTINUED | OUTPATIENT
Start: 2022-01-04 | End: 2022-01-06 | Stop reason: HOSPADM

## 2022-01-03 RX ORDER — ACETAMINOPHEN 650 MG/1
650 SUPPOSITORY RECTAL
Status: DISCONTINUED | OUTPATIENT
Start: 2022-01-03 | End: 2022-01-06 | Stop reason: HOSPADM

## 2022-01-03 RX ORDER — QUETIAPINE 300 MG/1
300 TABLET, FILM COATED, EXTENDED RELEASE ORAL
Status: ON HOLD | COMMUNITY
End: 2022-01-05 | Stop reason: SDUPTHER

## 2022-01-03 RX ADMIN — BUSPIRONE HYDROCHLORIDE 15 MG: 10 TABLET ORAL at 15:23

## 2022-01-03 RX ADMIN — BENZTROPINE MESYLATE 1 MG: 1 TABLET ORAL at 17:29

## 2022-01-03 RX ADMIN — QUETIAPINE FUMARATE 300 MG: 100 TABLET ORAL at 21:45

## 2022-01-03 RX ADMIN — BUSPIRONE HYDROCHLORIDE 15 MG: 10 TABLET ORAL at 08:50

## 2022-01-03 RX ADMIN — BENZTROPINE MESYLATE 1 MG: 1 TABLET ORAL at 08:50

## 2022-01-03 RX ADMIN — BUSPIRONE HYDROCHLORIDE 15 MG: 10 TABLET ORAL at 21:45

## 2022-01-03 NOTE — PROGRESS NOTES
Reason for Admission:  History of present illness  30-year-old male with history of schizophrenia, Asperger presented to hospital with suicidal ideation. Planned to stab himself with a knife. RUR Score:  OBSErVATION                   Plan for utilizing home health:      N/A    PCP: First and Last name:  Ollie Junior NP     Name of Practice:    Are you a current patient: Yes/No:    Approximate date of last visit:    Can you participate in a virtual visit with your PCP:                     Current Advanced Directive/Advance Care Plan: Full Code  Patient wants CPR and ventilations. Heathcare decision maker is his mother. Radha Cancel [phone]. Healthcare Decision Maker:   Click here to complete [address] including selection of the Healthcare Decision Maker Relationship (ie \"Primary\")                             Transition of Care Plan:                    Was at group home and left. [facility]  1/ Call [facility] for information  2/ Call [facility] for information. 3/ Call patient's mother. Care Management Interventions  PCP Verified by CM:  Yes  Transition of Care Consult (CM Consult): Discharge Planning  Support Systems: Parent(s)  The Plan for Transition of Care is Related to the Following Treatment Goals : F/U PCP appointment, F/U Mental Health, Assistance with Housing  The Patient and/or Patient Representative was Provided with a Choice of Provider and Agrees with the Discharge Plan?: Yes  Name of the Patient Representative Who was Provided with a Choice of Provider and Agrees with the Discharge Plan: Patient  Freedom of Choice List was Provided with Basic Dialogue that Supports the Patient's Individualized Plan of Care/Goals, Treatment Preferences and Shares the Quality Data Associated with the Providers?: Yes  Discharge Location  Discharge Placement:  (TBD)     BRANT Mackenzie/HARVEY  [phone]

## 2022-01-03 NOTE — H&P
History and Physical    Patient: Inessa Mtz MRN: [id number]  SSN: [id number]    YOB: 1995  Age: 32 y.o. Sex: male      Subjective:     History and physical telemedicine    Chief complaint suicidal ideation    History of present illness  60-year-old male with history of schizophrenia, Asperger presented to hospital with suicidal ideation. Apparently he lives in a group home and left ear not feeling safe. He states he has been dealing with a lot of stressors including being homeless. Patient was evaluated by psychiatry team in the ER and plan for inpatient admission however his COVID result returned positive. Patient to be admitted to the hospitalist team for monitoring. Patient seen in telemedicine video evaluation is not in any acute visible distress, appears calm. Answering questions appropriately. Vital signs afebrile, 88, 16, 128/71, 98% room air. Patient states he thinks he may have had a fever a few weeks ago but otherwise denies any malaise, shortness of breath, cough, chest pain. Lab work-up CBC is benign, chemistry benign, urinalysis negative, bacteria negative, alcohol negative, urine drug screen negative, flu screen negative, patient to be admitted to the hospital.    Past Medical History:   Diagnosis Date    Asperger syndrome     Schizophrenia ([facility] [address]     Scoliosis      Past Surgical History:   Procedure Laterality Date    HX CIRCUMCISION  1995    HX HERNIA REPAIR      as an infant     HX HERNIA REPAIR Bilateral 09/1995      Family History   Problem Relation Age of Onset    No Known Problems Mother     No Known Problems Father      Social History     Tobacco Use    Smoking status: Never Smoker    Smokeless tobacco: Never Used   Substance Use Topics    Alcohol use: Never      Prior to Admission medications    Medication Sig Start Date End Date Taking? Authorizing Provider   lurasidone (Latuda) 40 mg tab tablet Take 40 mg by mouth daily (with breakfast).    Yes Other, MD Chiqui   sertraline (Zoloft) 100 mg tablet Take 100 mg by mouth daily. Yes Yony, MD Chiqui   hydrOXYzine pamoate (VISTARIL) 50 mg capsule Take 1 Cap by mouth every eight (8) hours as needed for Anxiety. Indications: anxious 1/26/21  Yes Win Chance MD   benztropine (COGENTIN) 1 mg tablet Take 1 Tab by mouth two (2) times a day. Indications: extrapyramidal symptoms as a result of taking the medication 1/26/21  Yes Win Chnace MD   busPIRone (BUSPAR) 10 mg tablet Take 1 Tab by mouth three (3) times daily. Indications: repeated episodes of anxiety  Patient taking differently: Take 15 mg by mouth three (3) times daily. Indications: repeated episodes of anxiety 1/26/21  Yes iWn Chance MD   QUEtiapine (SEROquel) 300 mg tablet Take 1 Tab by mouth nightly. Indications: schizophrenia, insomnia 1/26/21  Yes Win Chance MD   ARIPiprazole (ABILIFY) 20 mg tablet Take 1 Tab by mouth nightly. Indications: schizophrenia 1/26/21   Win Chance MD   ARIPiprazole (ABILIFY) 10 mg tablet Take 1 Tab by mouth daily. Indications: schizophrenia 1/26/21   Win Chance MD        Allergies   Allergen Reactions    Aspirin Hives       Review of Systems:  A comprehensive review of systems was negative except for that written in the History of Present Illness.     Objective:     Vitals:    01/02/22 1953   BP: 128/71   Pulse: 88   Resp: 16   Temp: 98.2 °F (36.8 °C)   SpO2: 98%   Weight: 76.7 kg (169 lb)   Height: 6' 3\" (1.905 m)        Physical Exam:    Telemedicine physical exam  General awake alert, no visible distress  HEENT pupils equal round reactive, sclerae nonicteric  Neck supple  Cardiovascular regular  Lungs clear  Abdomen soft nontender  Extremities no edema  Musculoskeletal moving all extremities equally  Neuro awake alert nonfocal exam,  Psychiatry he is calm not agitated, report suicidal thoughts    Labs reviewed and discussed as above    Assessment  COVID-19 positive  Suicidal ideation  Schizophrenia    Plan  66-year-old male presents with suicidal ideation and plan for inpatient psych treatment however his COVID-19 has returned positive incidentally. He is fully vaccinated with Milo Fay and booster. He denies any specific Covid symptoms.   Admit to medicine for monitoring until cleared  Vitamin C, zinc, Pepcid, vitamin D,  No indication for steroids, or antibiotics  Maintaining saturation 98% room air  Check CRP,   Unknown timing of onse; continue to monitor for symptoms,   Depending on dispo plan; if remains asymptomatic can repeat test day 5 and if negative can be cleared on day 7.      1:1 jose Cash consult on medical floor  [facility]  Home meds    Signed By: Senait Arteaga MD     January 3, 2022

## 2022-01-03 NOTE — PROGRESS NOTES
Patient in room at this time. LR completed. Room assessed for safety. Patient is reporting today being suicidal with a plan to stab himself. Patient is calm & cooperative at this time. Patients clothing locked in closet in the room.

## 2022-01-03 NOTE — ED NOTES
Bedside and Verbal shift change report given to Jackson Felix RN (oncoming nurse) by Melinda Fowler RN (offgoing nurse). Report included the following information SBAR and ED Summary. Pt still noted to be in street clothes. Pt calm and cooperative at this time.  Pt requested and provided warm blanket

## 2022-01-03 NOTE — PROGRESS NOTES
Patient arrived to unit. Room prepped for safety by Bhaskar HAZEL. Oriented to room, Admission assessment completed. Information provided by patient. VSS, RA, denies any pain. One to one observation.

## 2022-01-03 NOTE — ED TRIAGE NOTES
Reports to be hearing voices and having thoughts of wanting to harm self. Plan is to stab himself with a knife.   Also reports he is depressed partially due to being homeless

## 2022-01-03 NOTE — ACP (ADVANCE CARE PLANNING)
Current Advanced Directive/Advance Care Plan: Full Code  Patient wants CPR and ventilations. Heathcare decision maker is his mother. Cathie Swan [phone].

## 2022-01-03 NOTE — BSMART NOTE
Comprehensive Assessment Form Part 1    Section I - Disposition    Axis I - Schizoaffective Disorder, ADHD  Axis II - Autism Spectrum Disorder (previously diagnosed with Asperger's)  Axis III - Scoliosis  Axis IV - Homeless, Relationship stressors  [place] V - 35      The Medical Doctor to Psychiatrist conference was not completed. The Medical Doctor is in agreement with Psychiatrist disposition because of (reason) patient is requesting voluntary admission. The plan is admit to appropriate psychiatric bed. The on-call Psychiatrist consulted was Dr. Jennifer Aceves. The admitting Psychiatrist will be Dr. Jennifer Aceves. The admitting Diagnosis is Schizoaffective Disorder. The Payor source is [facility] [address] [facility]. This writer reviewed the [address] in nursing flowsheet and the risk level assigned is not assessed. Based on this assessment, the risk of suicide is high and the plan is admit to inpatient psych. Section II - Integrated Summary  Summary:  Patient is a 32year old male seen face to face in the ER. He came to the ER reporting suicidal ideation with a plan to stab himself with a knife. He moved out of his [facility] placement at [facility] today. He is now homeless. He reported he is depressed and hearing voices that \"I should be dead. \"  He hears voices all the time but states they are currently worse. He is an open client at [facility]. He has been hospitalized in the past, most recently in January 2021 at [facility]. He denied homicidal ideation. His mother was contacted for collateral information who reported that patient had received a notice at his STACY and had to move out by 1/4/22, so she went today and got his stuff and moved him out of the group home. She reported he cannot stay with her due to past aggression towards her. She reported he has been struggling recently with mood swings and is currently \"quite depressed. \"  His [facility] CM and skills builder have been looking at housing options for him. Patient is requesting voluntary admission. The patient has demonstrated mental capacity to provide informed consent. The information is given by the patient, past medical records, and mother. The Chief Complaint is mental health problem. The Precipitant Factors are homeless, relationship stressors. Previous Hospitalizations: yes  The patient has not previously been in restraints. Current Psychiatrist and CM (Aldo Chisholm) are with [facility]. Lethality Assessment:    The potential for suicide is noted by the following: previous history of attempts, defined plan and ideation. The potential for homicide is not noted. The patient has not been a perpetrator of sexual or physical abuse. There are not pending charges. The patient is felt to be at risk for self harm or harm to others. The attending nurse was advised the patient needs supervision. Section III - Psychosocial  The patient's overall mood and attitude is withdrawn. Feelings of helplessness and hopelessness are observed by patient's self report. Generalized anxiety is not observed. Panic is not observed. Phobias are not observed. Obsessive compulsive tendencies are not observed. Section IV - Mental Status Exam  The patient's appearance shows no evidence of impairment. The patient's behavior shows no evidence of impairment. The patient is oriented to time, place, person and situation. The patient's speech shows no evidence of impairment. The patient's mood is withdrawn. The range of affect is constricted. The patient's thought content demonstrates no evidence of impairment. The thought process shows no evidence of impairment. The patient's perception demonstrated changes in the following:  auditory hallucinations. The patient's memory shows no evidence of impairment. The patient's appetite shows no evidence of impairment. The patient's sleep shows no evidence of impairment.  The patient's insight is blaming. The patient's judgement is psychologically impaired. Section V - Substance Abuse  The patient is not using substances. Section VI - Living Arrangements  The patient is single. The patient is homeless. The patient has no children. The patient does not plan to return home upon discharge. The patient does not have legal issues pending. The patient's source of income comes from social security. Tenriism and cultural practices have not been voiced at this time. The patient's greatest support comes from his mother and this person will be involved with the treatment. The patient has not been in an event described as horrible or outside the realm of ordinary life experience either currently or in the past.  The patient has not been a victim of sexual/physical abuse. Section VII - Other Areas of Clinical Concern  The highest grade achieved is not assessed with the overall quality of school experience being described as unknown. The patient is currently disabled and speaks [place] as a primary language. The patient has no communication impairments affecting communication. The patient's preference for learning can be described as: can read and write adequately.   The patient's hearing is normal.  The patient's vision is normal.      Dick Rodriguez MA

## 2022-01-03 NOTE — ED NOTES
TRANSFER - OUT REPORT:    Verbal report given to [address], RN(name) on Dante Kraft  being transferred to Med Surg(unit) for routine progression of care       Report consisted of patients Situation, Background, Assessment and   Recommendations(SBAR). Information from the following report(s) SBAR and ED Summary was reviewed with the receiving nurse. Lines:       Opportunity for questions and clarification was provided.       Patient transported with:   Registered Nurse

## 2022-01-03 NOTE — CONSULTS
PSYCHIATRY CONSULT NOTE:    REASON FOR CONSULT:  Schizophrenia  suicidal ideation    HISTORY OF PRESENTING COMPLAINT:  Jayro Robertson is a 32 y.o. male  with history of schizophrenia, Asperger presented to hospital with suicidal ideation. Apparently he lives in a group home and left there not feeling safe. He states he has been dealing with a lot of stressors including being homeless. Patient was evaluated by psychiatry team in the ER and plan for inpatient admission however his COVID result returned positive. Patient to be admitted to the hospitalist team for monitoring. Patient seen in telemedicine video evaluation is not in any acute visible distress, appears calm. Answering questions appropriately. Vital signs afebrile, 88, 16, 128/71, 98% room air. Patient states he thinks he may have had a fever a few weeks ago but otherwise denies any malaise, shortness of breath, cough, chest pain. Lab work-up CBC is benign, chemistry benign, urinalysis negative, bacteria negative, alcohol negative, urine drug screen negative, flu screen negative, patient to be admitted to the hospital.    PAST PSYCHIATRIC HISTORY:  Therapy, Out Patient aspergers and In Patient Schizophrenia. Compliant with medications. Prior hospitalizations. SUBSTANCE ABUSE HISTORY:  Social History     Substance and Sexual Activity   Drug Use Never     Social History     Substance and Sexual Activity   Alcohol Use Never     Social History     Tobacco Use   Smoking Status Never Smoker   Smokeless Tobacco Never Used       PAST MEDICAL HISTORY:  Past Medical History:   Diagnosis Date    Asperger syndrome     Schizophrenia ([address]     Scoliosis        SOCIAL HISTORY:  Homeless now, but reports being put out of his group home due disagreements  Denies drugs, ETOH, Cigarettes. Has a  at the local [facility].     VITALS:  Visit Vitals  /61 (BP 1 Location: Left upper arm, BP Patient Position: At rest)   Pulse 65   Temp 98 °F (36.7 °C) Resp 16   Ht 6' 3\" (1.905 m)   Wt 76.7 kg (169 lb)   SpO2 99%   BMI 21.12 kg/m²       MEDICATIONS:    Current Facility-Administered Medications:     benztropine (COGENTIN) tablet 1 mg, 1 mg, Oral, BID, Chandu Silva MD, 1 mg at 01/03/22 0850    busPIRone (BUSPAR) tablet 15 mg, 15 mg, Oral, TID, Siva Silva MD, 15 mg at 01/03/22 0850    QUEtiapine (SEROquel) tablet 300 mg, 300 mg, Oral, QHS, Chandu Silva MD    sodium chloride (NS) flush 5-40 mL, 5-40 mL, IntraVENous, Q8H, Chandu Silva MD    sodium chloride (NS) flush 5-40 mL, 5-40 mL, IntraVENous, PRN, Siva Silva MD    acetaminophen (TYLENOL) tablet 650 mg, 650 mg, Oral, Q6H PRN **OR** acetaminophen (TYLENOL) suppository 650 mg, 650 mg, Rectal, Q6H PRN, Siva Silva MD    polyethylene glycol (MIRALAX) packet 17 g, 17 g, Oral, DAILY PRN, Chandu Silva MD    ondansetron (ZOFRAN ODT) tablet 4 mg, 4 mg, Oral, Q8H PRN **OR** ondansetron (ZOFRAN) injection 4 mg, 4 mg, IntraVENous, Q6H PRN, Chandu Silva MD    enoxaparin (LOVENOX) injection 40 mg, 40 mg, SubCUTAneous, DAILY, Chandu Silva MD    MENTAL STATUS EXAM:  Calm, cooperative, clear coherent speech  Oriented in all spheres  Vigilant and Denies SI/HI/AH/vH.  straight forward and simlple  Goal directed and No aggression or violence. ASSESSMENT AND PLAN:  Autistic spectrum disorder and Schizoaffective Disorder, Mental retardation, severity unknown , Problems related to social environment, Housing problems and Other psychosocial or environmental problems  and 61-70 mild symptoms    RECOMMENDATIONS:  Not a candidate for inpatient psychiatry at this time  Discontinue 1:1 nursing  Monitor for acute changes.    Restart home medications  Disposition planning with social work  Thank you for this consultation    Otis Benavides MD  1/3/2022

## 2022-01-03 NOTE — ED PROVIDER NOTES
EMERGENCY DEPARTMENT HISTORY AND PHYSICAL EXAM      Date: 1/2/2022  Patient Name: Shad Corley    History of Presenting Illness     Chief Complaint   Patient presents with   [address] Problem       History Provided By: Patient      Additional History (Context): Shad Corley is a 32 y.o. male with Schizophrenia, Asperger who presents with mental health problem. Pt reports he lives in a group home and left today. He states they are unable to handle his problems. Pt states he does not feel safe. Denies altercation at group home. Patient is feeling depressed because he is homeless and states he wants to kill himself via stabbing. He also reports auditory hallucinations but no visual hallucinations. States he has not ate today and is hungry. He is unsure what medications he take and reports his mother knows he is in the ER.        PCP: Cody Meza NP    Current Facility-Administered Medications   Medication Dose Route Frequency Provider Last Rate Last Admin    benztropine (COGENTIN) tablet 1 mg  1 mg Oral BID Samuel Silva MD   1 mg at 01/03/22 0850    busPIRone (BUSPAR) tablet 15 mg  15 mg Oral TID Kyleigh Rdz MD   15 mg at 01/03/22 1523    QUEtiapine (SEROquel) tablet 300 mg  300 mg Oral QHS Samuel Silva MD        sodium chloride (NS) flush 5-40 mL  5-40 mL IntraVENous Q8H Chandu Silva MD        sodium chloride (NS) flush 5-40 mL  5-40 mL IntraVENous PRN Samuel Silva MD        acetaminophen (TYLENOL) tablet 650 mg  650 mg Oral Q6H PRN Samuel Silva MD        Or    acetaminophen (TYLENOL) suppository 650 mg  650 mg Rectal Q6H PRN Samuel Silva MD        polyethylene glycol (MIRALAX) packet 17 g  17 g Oral DAILY PRN Samuel Sliva MD        ondansetron (ZOFRAN ODT) tablet 4 mg  4 mg Oral Q8H PRN Samuel Silva MD        Or    ondansetron (ZOFRAN) injection 4 mg  4 mg IntraVENous Q6H PRN Chandu Silva MD        enoxaparin (LOVENOX) injection 40 mg  40 mg SubCUTAneous DAILY Radha Sanches MD           Past History     Past Medical History:  Past Medical History:   Diagnosis Date    Asperger syndrome     Schizophrenia ([address]     Scoliosis        Past Surgical History:  Past Surgical History:   Procedure Laterality Date    HX CIRCUMCISION  1995    HX HERNIA REPAIR      as an infant     HX HERNIA REPAIR Bilateral 09/1995       Family History:  Family History   Problem Relation Age of Onset    No Known Problems Mother     No Known Problems Father        Social History:  Social History     Tobacco Use    Smoking status: Never Smoker    Smokeless tobacco: Never Used   Vaping Use    Vaping Use: Never used   Substance Use Topics    Alcohol use: Never    Drug use: Never       Allergies: Allergies   Allergen Reactions    Aspirin Hives         Review of Systems   Review of Systems   Constitutional: Negative for chills and fever. HENT: Negative for congestion, rhinorrhea and sore throat. Respiratory: Negative for cough and shortness of breath. Cardiovascular: Negative for chest pain and leg swelling. Gastrointestinal: Negative for abdominal pain, constipation, diarrhea, nausea and vomiting. Genitourinary: Negative for dysuria, frequency and urgency. Musculoskeletal: Negative for back pain, gait problem and neck pain. Skin: Negative for wound. Neurological: Negative for dizziness, numbness and headaches. Psychiatric/Behavioral: Positive for hallucinations and suicidal ideas. Negative for agitation, confusion, decreased concentration and sleep disturbance. The patient is not nervous/anxious. All other systems reviewed and are negative.       Physical Exam     Vitals:    01/02/22 1953 01/03/22 0724 01/03/22 0834 01/03/22 1520   BP: 128/71 (!) 110/56 133/61 125/66   Pulse: 88 73 65 64   Resp: 16 16  16   Temp: 98.2 °F (36.8 °C) 97.7 °F (36.5 °C) 98 °F (36.7 °C) 98.6 °F (37 °C)   SpO2: 98% 99%  97%   Weight: 76.7 kg (169 lb)      Height: 6' 3\" (1.905 m) Physical Exam  Vitals and nursing note reviewed. Constitutional:       General: He is not in acute distress. Appearance: He is well-developed. He is not ill-appearing. HENT:      Head: Normocephalic and atraumatic. Right Ear: Tympanic membrane, ear canal and external ear normal.      Left Ear: Tympanic membrane, ear canal and external ear normal.      Nose: Nose normal.      Mouth/Throat:      Mouth: Mucous membranes are moist.      Pharynx: Oropharynx is clear. No oropharyngeal exudate or posterior oropharyngeal erythema. Eyes:      Extraocular Movements: Extraocular movements intact. Conjunctiva/sclera: Conjunctivae normal.      Pupils: Pupils are equal, round, and reactive to light. Cardiovascular:      Rate and Rhythm: Normal rate and regular rhythm. Pulses: Normal pulses. Heart sounds: Normal heart sounds. Pulmonary:      Effort: Pulmonary effort is normal.      Breath sounds: Normal breath sounds. Abdominal:      General: Bowel sounds are normal. There is no distension. Palpations: Abdomen is soft. Tenderness: There is no abdominal tenderness. Musculoskeletal:         General: Normal range of motion. Cervical back: Normal range of motion and neck supple. Lymphadenopathy:      Cervical: No cervical adenopathy. Skin:     General: Skin is warm and dry. Neurological:      Mental Status: He is alert and oriented to person, place, and time. GCS: GCS eye subscore is 4. GCS verbal subscore is 5. GCS motor subscore is 6. Cranial Nerves: No cranial nerve deficit. Psychiatric:         Attention and Perception: He perceives auditory hallucinations. Mood and Affect: Mood and affect normal.         Speech: Speech normal.         Behavior: Behavior is withdrawn. Behavior is not agitated, aggressive, hyperactive or combative. Thought Content: Thought content includes suicidal ideation.  Thought content does not include homicidal ideation. Thought content includes suicidal plan. Thought content does not include homicidal plan. Diagnostic Study Results     Labs -   No results found for this or any previous visit (from the past 12 hour(s)). Radiologic Studies -   No orders to display     CT Results  (Last 48 hours)    None        CXR Results  (Last 48 hours)    None            Medical Decision Making   I am the first provider for this patient. I reviewed the vital signs, available nursing notes, past medical history, past surgical history, family history and social history. Vital Signs-Reviewed the patient's vital signs. Records Reviewed: Nursing Notes and Old Medical Records     33 yo M presents mental health evaluation exhibiting internal stimuli and withdrawn at times. Physical exam is benign. Due to SI, plan to consult [facility] to evaluate for inpatient vs outpatient admission. DDX: schizophrenia, depression, anxiety, suicidal ideation        ED Course:   ED Course as of 01/03/22 1622   Sun Jan 02, 2022 2125 Report  received fromRishabh DUARTE . Patient has been evaluated by Ema Saenz counselor. Kae Trevino Jan 03, 2022   0024 Care of patient transferred to attending. Patient COVID +  [AN]   0330 Given + covid results, discussed with hospitalist, Dr. Mago Pop, for admission to the medical floor  [CANDIDA]      ED Course User Index  [AN] MARISA Gunn MD         Disposition:  Admit     DISCHARGE NOTE:     Pt has been reexamined. Patient has no new complaints, changes, or physical findings. Care plan outlined and precautions discussed. All of pt's questions and concerns were addressed. Patient was instructed and agrees to follow up with PCP, as well as to return to the ED upon further deterioration. Patient is ready to go home.     Follow-up Information    None         Current Discharge Medication List          Provider Notes (Medical Decision Making): Procedures:  Procedures    Diagnosis     Clinical Impression:   1. Suicidal ideation    2.  COVID-19

## 2022-01-03 NOTE — ED NOTES
Patient is alert and oriented x 4 and in no acute distress at this time. Respirations are at a regular rate, depth, and pattern. Patient updated on plan of care and has no questions or concerns at this time. Call bell within reach. Will continue to monitor. Please reference nursing assessment. Emergency Department Nursing Plan of Care       The Nursing Plan of Care is developed from the Nursing assessment and Emergency Department Attending provider initial evaluation. The plan of care may be reviewed in the ED Provider note.     The Plan of Care was developed with the following considerations:   Patient / Family readiness to learn indicated by:verbalized understanding and successful return demonstration  Persons(s) to be included in education: patient  Barriers to Learning/Limitations:No    Signed     Lamont Hightower RN    1/2/2022   9:25 PM

## 2022-01-03 NOTE — PROGRESS NOTES
[facility]: MED RECONCILIATION    Comments/Recommendations:   Med rec performed via interview with [facility] where patient recently resided. Patient was not interviewed due to current condition. Of note, facility states patient was taken off of aripiprazole and started on lurasidone mid December. Denied patient receiving any injections outpatient. Allergies: Aspirin    Prior to Admission Medications:   Prior to Admission Medications   Prescriptions Last Dose Informant Patient Reported? Taking? QUEtiapine (SEROquel) 50 mg tablet  Other Yes Yes   Sig: Take 50 mg by mouth nightly as needed (insomnia). QUEtiapine SR (SEROquel XR) 300 mg sr tablet 1/1/2022 at PM Other Yes Yes   Sig: Take 300 mg by mouth nightly. benztropine (COGENTIN) 1 mg tablet 1/2/2022 at AM Other No Yes   Sig: Take 1 Tab by mouth two (2) times a day. Indications: extrapyramidal symptoms as a result of taking the medication   busPIRone (BUSPAR) 15 mg tablet 1/2/2022 at AM Other Yes Yes   Sig: Take 15 mg by mouth three (3) times daily. hydrOXYzine pamoate (VISTARIL) 50 mg capsule  Other No Yes   Sig: Take 1 Cap by mouth every eight (8) hours as needed for Anxiety. Indications: anxious   lurasidone (Latuda) 40 mg tab tablet 1/2/2022 at AM Other Yes Yes   Sig: Take 40 mg by mouth daily (with breakfast). sertraline (Zoloft) 100 mg tablet 1/2/2022 at AM Other Yes Yes   Sig: Take 100 mg by mouth daily.       Facility-Administered Medications: None                  CARLA Tao   Contact: [phone]

## 2022-01-04 LAB — CRP SERPL-MCNC: <0.29 MG/DL (ref 0–0.6)

## 2022-01-04 PROCEDURE — G0378 HOSPITAL OBSERVATION PER HR: HCPCS

## 2022-01-04 PROCEDURE — 74011250637 HC RX REV CODE- 250/637: Performed by: INTERNAL MEDICINE

## 2022-01-04 PROCEDURE — 86140 C-REACTIVE PROTEIN: CPT

## 2022-01-04 PROCEDURE — 74011250637 HC RX REV CODE- 250/637: Performed by: PSYCHIATRY & NEUROLOGY

## 2022-01-04 PROCEDURE — 36415 COLL VENOUS BLD VENIPUNCTURE: CPT

## 2022-01-04 PROCEDURE — 74011000250 HC RX REV CODE- 250: Performed by: INTERNAL MEDICINE

## 2022-01-04 RX ORDER — QUETIAPINE 200 MG/1
400 TABLET, FILM COATED, EXTENDED RELEASE ORAL
Status: DISCONTINUED | OUTPATIENT
Start: 2022-01-04 | End: 2022-01-06 | Stop reason: HOSPADM

## 2022-01-04 RX ADMIN — BUSPIRONE HYDROCHLORIDE 15 MG: 10 TABLET ORAL at 15:11

## 2022-01-04 RX ADMIN — Medication 2000 UNITS: at 08:55

## 2022-01-04 RX ADMIN — LURASIDONE HYDROCHLORIDE 40 MG: 20 TABLET, FILM COATED ORAL at 12:27

## 2022-01-04 RX ADMIN — BUSPIRONE HYDROCHLORIDE 15 MG: 10 TABLET ORAL at 21:20

## 2022-01-04 RX ADMIN — ZINC SULFATE 220 MG (50 MG) CAPSULE 1 CAPSULE: CAPSULE at 08:55

## 2022-01-04 RX ADMIN — SERTRALINE HYDROCHLORIDE 100 MG: 50 TABLET ORAL at 08:55

## 2022-01-04 RX ADMIN — BENZTROPINE MESYLATE 1 MG: 1 TABLET ORAL at 18:17

## 2022-01-04 RX ADMIN — OXYCODONE HYDROCHLORIDE AND ACETAMINOPHEN 1000 MG: 500 TABLET ORAL at 08:55

## 2022-01-04 RX ADMIN — BUSPIRONE HYDROCHLORIDE 15 MG: 10 TABLET ORAL at 08:54

## 2022-01-04 RX ADMIN — FAMOTIDINE 20 MG: 20 TABLET ORAL at 08:55

## 2022-01-04 RX ADMIN — QUETIAPINE FUMARATE 400 MG: 200 TABLET, EXTENDED RELEASE ORAL at 21:20

## 2022-01-04 RX ADMIN — BENZTROPINE MESYLATE 1 MG: 1 TABLET ORAL at 08:54

## 2022-01-04 NOTE — PROGRESS NOTES
Hospitalist Progress Note    NAME: Ghassan Carranza   :  1995   MRN:  [id number]   Room Number:  987/11  @ [facility]       Interim Hospital Summary: 32 y.o. male whom presented on 2022 with      Assessment / Plan:    Suicidal ideation  Schizophrenia  Seen by inpatient psychiatry. Home meds restarted. Does not need inpatient psych per psychiatry assessment. Discontinue suicide precautions, 1:1.        COVID-19 positive  Asymptomatic. CXR did not show acute process. Full code   DVT Px: enoxaparin       Subjective:     Chief Complaint / Reason for Physician Visit  \"im doing ok\". Discussed with RN events overnight. Review of Systems:  No fevers, chills, appetite change, cough, sputum production, shortness of breath, dyspnea on exertion, nausea, vomitting, diarrhea, constipation, chest pain, leg edema, abdominal pain, joint pain, rash, itching. Tolerating PT/OT. Tolerating diet. Objective:     VITALS:   Last 24hrs VS reviewed since prior progress note. Most recent are:  Patient Vitals for the past 24 hrs:   Temp Pulse Resp BP SpO2   22 0818 -- -- -- -- 98 %   22 0810 97.7 °F (36.5 °C) 77 16 (!) 122/56 99 %   22 0444 97.7 °F (36.5 °C) 64 16 (!) 124/59 97 %   22 0416 -- -- 16 -- --   22 0006 97.9 °F (36.6 °C) 74 16 (!) 118/57 92 %   22 2006 97.9 °F (36.6 °C) 63 16 106/68 97 %   22 1520 98.6 °F (37 °C) 64 16 125/66 97 %     No intake or output data in the 24 hours ending 22 1404     PHYSICAL EXAM:  General: WD, WN. Alert, cooperative, no acute distress    EENT:  EOMI. Anicteric sclerae. MMM  Resp:  CTA bilaterally, no wheezing or rales. No accessory muscle use  CV:  Regular  rhythm,  normal S1/S2, no murmurs rubs gallops, No edema  GI:  Soft, Non distended, Non tender. +Bowel sounds  Neurologic:  Alert and oriented X 3, normal speech,   Psych:   Good insight. Not anxious nor agitated  Skin:  No rashes.   No jaundice    Reviewed most current lab test results and cultures  YES  Reviewed most current radiology test results   YES  Review and summation of old records today    NO  Reviewed patient's current orders and MAR    YES  PMH/SH reviewed - no change compared to H&P  ________________________________________________________________________  Care Plan discussed with:    Comments   Patient x    Family      RN x    Care Manager x    Consultant                       x Multidiciplinary team rounds were held today with , nursing, pharmacist and clinical coordinator. Patient's plan of care was discussed; medications were reviewed and discharge planning was addressed. ________________________________________________________________________  Total NON critical care TIME: 25   Minutes    Total CRITICAL CARE TIME Spent:   Minutes non procedure based      Comments   >50% of visit spent in counseling and coordination of care x    ________________________________________________________________________  Kyle Bee MD     Procedures: see electronic medical records for all procedures/Xrays and details which were not copied into this note but were reviewed prior to creation of Plan. LABS:  I reviewed today's most current labs and imaging studies.   Pertinent labs include:  Recent Labs     01/02/22  2238   WBC 5.1   HGB 14.7   HCT 44.3        Recent Labs     01/02/22  2238      K 4.1      CO2 30   GLU 86   BUN 16   CREA 1.30   CA 9.3   ALB 4.2   TBILI 0.5   ALT 27       Signed: Kyle Bee MD

## 2022-01-04 NOTE — PROGRESS NOTES
0818)Patient was assessed. VS taken. No complain of pain and any discomfort. Suicide risk assessment done. Patient stated\" I have little suicidal thought\"\". 0918)Hourly suicidal risk assessment done. 1020)Hourly roundings done. 1230) Lunch warmed and served. 1915) Bedside shift change report given to Jalen Barbosa (oncoming nurse) by Andres Christensen and LIIR Mitchell (offgoing nurse). Report included the following information SBAR, MAR, Accordion and Recent Results.

## 2022-01-04 NOTE — PROGRESS NOTES
SBAR report received from off going [facility]. Per report Dr. Pratik Rivas d/c constant observer today and ordered video monitoring and Q hr rounding. Per report from RN ANNIKA assessment complete and environmental safety established. Note: my room safety assessment indicates there are ligature risks in the room, such as the BP cuff, however these are medically necessary items that can not be removed r/t COVID infection. PT remains under continuous video monitoring per order and has Q 1 hr rounds per order. 6300-6368: PT is sleeping, unable to give Abilify r/t pt drowsiness. 6386-1374: Pt remains sleeping. Q1 hr rounding by LESLEE soler and ILIR Delatorre. Continuous video monitoring maintained. 5819-0937: Labs drawn and sent. Pt remains drowsy. Pt rested well overnight.

## 2022-01-04 NOTE — PROGRESS NOTES
6245) Bedside shift change report given to Candace Torres, RN (oncoming nurse) by  Ozzie Lemus nurse). Report included the following information SBAR, Kardex and MAR.   21 ) IDR with Dr. Teo Sarmiento (MD), Dea Gloria (pharmacy), Izabel (), Ana María Caro (nursing supervisor), and Candace Torres (RN) to discuss plan including discharge planning, discontinue 1:1  [phone]) Discuss with Dr. Teo Sarmiento pt stated \"the voices came back\". Message Dr. Salena Lino   21 ) Perfectserve to Dr. Salena Lino pt concern about voices  [phone]) Message from Dr. Salena Lino, plan to increase his medications.

## 2022-01-04 NOTE — SUICIDE SAFETY PLAN
[place] suicidal scale complete. Unable to result score as patient was not willing to answer all questions. LRA complete, environment is free from any sharp objects. Dr. Cyn Harding discontinued one-one observation, utilizing video monitoring, Q 1hr  Checks ongoing.  Report given to Joshua Newman

## 2022-01-04 NOTE — CONSULTS
PSYCHIATRY CONSULT NOTE:    REASON FOR CONSULT:  Schizophrenia  suicidal ideation    HISTORY OF PRESENTING COMPLAINT:  Vi Gaytan is a 32 y.o. male  with history of schizophrenia, Asperger presented to hospital with suicidal ideation. Apparently he lives in a group home and left there not feeling safe. He states he has been dealing with a lot of stressors including being homeless. Patient was evaluated by psychiatry team in the ER and plan for inpatient admission however his COVID result returned positive. Patient to be admitted to the hospitalist team for monitoring. Patient seen in telemedicine video evaluation is not in any acute visible distress, appears calm. Answering questions appropriately. Vital signs afebrile, 88, 16, 128/71, 98% room air. Patient states he thinks he may have had a fever a few weeks ago but otherwise denies any malaise, shortness of breath, cough, chest pain. Lab work-up CBC is benign, chemistry benign, urinalysis negative, bacteria negative, alcohol negative, urine drug screen negative, flu screen negative, patient to be admitted to the hospital.    01/04 - Vi Gaytan reports feeling alright but notes the voices were a little more loud today. He was listening to LoveThis music at the time. Reports that it helps to defray the voices telling him to harm himself. He feels safe in the hospital and does not want to follow the commands. Moods are alright. Denies SI/HI/VH. No aggression or violence. Appropriately interactive and aware. Tolerating medications well. Eating and sleeping fairly. PAST PSYCHIATRIC HISTORY:  Therapy, Out Patient aspergers and In Patient Schizophrenia. Compliant with medications. Prior hospitalizations.     SUBSTANCE ABUSE HISTORY:  Social History     Substance and Sexual Activity   Drug Use Never     Social History     Substance and Sexual Activity   Alcohol Use Never     Social History     Tobacco Use   Smoking Status Never Smoker   Smokeless Tobacco Never Used       PAST MEDICAL HISTORY:  Past Medical History:   Diagnosis Date    Asperger syndrome     [address] [address]     Scoliosis        SOCIAL HISTORY:  Homeless now, but reports being put out of his group home due disagreements  Denies drugs, ETOH, Cigarettes. Has a  at the local [facility].     VITALS:  Visit Vitals  BP (!) 122/56   Pulse 77   Temp 97.7 °F (36.5 °C)   Resp 16   Ht 6' 3\" (1.905 m)   Wt 76.7 kg (169 lb)   SpO2 98%   BMI 21.12 kg/m²       MEDICATIONS:    Current Facility-Administered Medications:     benztropine (COGENTIN) tablet 1 mg, 1 mg, Oral, BID, Chandu Silva MD, 1 mg at 01/04/22 0854    busPIRone (BUSPAR) tablet 15 mg, 15 mg, Oral, TID, Chandu Silva MD, 15 mg at 01/04/22 0854    lurasidone (LATUDA) tablet 40 mg, 40 mg, Oral, DAILY WITH BREAKFAST, Chandu Silva MD, 40 mg at 01/04/22 1227    sertraline (ZOLOFT) tablet 100 mg, 100 mg, Oral, DAILY, Chandu Silva MD, 100 mg at 01/04/22 0855    sodium chloride (NS) flush 5-40 mL, 5-40 mL, IntraVENous, Q8H, Chandu Silva MD, 10 mL at 01/04/22 0858    sodium chloride (NS) flush 5-40 mL, 5-40 mL, IntraVENous, PRN, Elfego Silva MD    acetaminophen (TYLENOL) tablet 650 mg, 650 mg, Oral, Q6H PRN **OR** acetaminophen (TYLENOL) suppository 650 mg, 650 mg, Rectal, Q6H PRN, Elfego Silva MD    polyethylene glycol (MIRALAX) packet 17 g, 17 g, Oral, DAILY PRN, Chandu Silva MD    ondansetron (ZOFRAN ODT) tablet 4 mg, 4 mg, Oral, Q8H PRN **OR** ondansetron (ZOFRAN) injection 4 mg, 4 mg, IntraVENous, Q6H PRN, Chandu Silva MD    enoxaparin (LOVENOX) injection 40 mg, 40 mg, SubCUTAneous, DAILY, Elfego Silva MD    ascorbic acid (vitamin C) (VITAMIN C) tablet 1,000 mg, 1,000 mg, Oral, DAILY, Chandu Silva MD, 1,000 mg at 01/04/22 0855    zinc sulfate (ZINCATE) 50 mg zinc (220 mg) capsule 1 Capsule, 1 Capsule, Oral, DAILY, Elfego Silva MD, 1 Capsule at 01/04/22 0855    cholecalciferol (VITAMIN D3) (1000 Units /25 mcg) tablet 2,000 Units, 2,000 Units, Oral, DAILY, Chandu Silva MD, 2,000 Units at 01/04/22 0855    famotidine (PEPCID) tablet 20 mg, 20 mg, Oral, BID, Chandu Silva MD, 20 mg at 01/04/22 0855    QUEtiapine SR (SEROquel XR) TABLET 300 mg, 300 mg, Oral, QHS, Lucila Vanegas MD    MENTAL STATUS EXAM:  Calm, cooperative, clear coherent speech  Oriented in all spheres  Vigilant and Denies SI/HI/AH/vH.  straight forward and simlple  Goal directed and No aggression or violence. ASSESSMENT AND PLAN:  Autistic spectrum disorder and Schizoaffective Disorder, Mental retardation, severity unknown , Problems related to social environment, Housing problems and Other psychosocial or environmental problems  and 61-70 mild symptoms    RECOMMENDATIONS:  Not a candidate for inpatient psychiatry at this time  Increase Seroquel  mg PO Qhs  Monitor for acute changes.    Disposition planning with social work   Thank you for this consultation    Lillie Salinas MD  1/4/2022

## 2022-01-05 PROCEDURE — 74011250637 HC RX REV CODE- 250/637: Performed by: INTERNAL MEDICINE

## 2022-01-05 PROCEDURE — 74011250637 HC RX REV CODE- 250/637: Performed by: PSYCHIATRY & NEUROLOGY

## 2022-01-05 PROCEDURE — 74011250636 HC RX REV CODE- 250/636: Performed by: INTERNAL MEDICINE

## 2022-01-05 PROCEDURE — G0378 HOSPITAL OBSERVATION PER HR: HCPCS

## 2022-01-05 RX ORDER — SERTRALINE HYDROCHLORIDE 100 MG/1
100 TABLET, FILM COATED ORAL DAILY
Qty: 30 TABLET | Refills: 0 | Status: SHIPPED | OUTPATIENT
Start: 2022-01-05

## 2022-01-05 RX ORDER — BENZTROPINE MESYLATE 1 MG/1
1 TABLET ORAL 2 TIMES DAILY
Qty: 60 TABLET | Refills: 0 | Status: SHIPPED | OUTPATIENT
Start: 2022-01-05 | End: 2022-04-11 | Stop reason: ALTCHOICE

## 2022-01-05 RX ORDER — QUETIAPINE 300 MG/1
300 TABLET, FILM COATED, EXTENDED RELEASE ORAL
Qty: 30 TABLET | Refills: 0 | Status: SHIPPED | OUTPATIENT
Start: 2022-01-05 | End: 2022-08-09 | Stop reason: ALTCHOICE

## 2022-01-05 RX ORDER — BUSPIRONE HYDROCHLORIDE 15 MG/1
15 TABLET ORAL 3 TIMES DAILY
Qty: 30 TABLET | Refills: 0 | Status: SHIPPED | OUTPATIENT
Start: 2022-01-05

## 2022-01-05 RX ORDER — HYDROXYZINE PAMOATE 50 MG/1
50 CAPSULE ORAL
Qty: 30 CAPSULE | Refills: 0 | Status: SHIPPED | OUTPATIENT
Start: 2022-01-05

## 2022-01-05 RX ADMIN — LURASIDONE HYDROCHLORIDE 40 MG: 20 TABLET, FILM COATED ORAL at 08:29

## 2022-01-05 RX ADMIN — BUSPIRONE HYDROCHLORIDE 15 MG: 10 TABLET ORAL at 22:00

## 2022-01-05 RX ADMIN — QUETIAPINE FUMARATE 400 MG: 200 TABLET, EXTENDED RELEASE ORAL at 22:00

## 2022-01-05 RX ADMIN — BUSPIRONE HYDROCHLORIDE 15 MG: 10 TABLET ORAL at 08:29

## 2022-01-05 RX ADMIN — Medication 2000 UNITS: at 08:29

## 2022-01-05 RX ADMIN — ZINC SULFATE 220 MG (50 MG) CAPSULE 1 CAPSULE: CAPSULE at 08:29

## 2022-01-05 RX ADMIN — SERTRALINE HYDROCHLORIDE 100 MG: 50 TABLET ORAL at 08:30

## 2022-01-05 RX ADMIN — BENZTROPINE MESYLATE 1 MG: 1 TABLET ORAL at 08:29

## 2022-01-05 RX ADMIN — BENZTROPINE MESYLATE 1 MG: 1 TABLET ORAL at 18:00

## 2022-01-05 RX ADMIN — BUSPIRONE HYDROCHLORIDE 15 MG: 10 TABLET ORAL at 18:00

## 2022-01-05 RX ADMIN — OXYCODONE HYDROCHLORIDE AND ACETAMINOPHEN 1000 MG: 500 TABLET ORAL at 08:29

## 2022-01-05 NOTE — PROGRESS NOTES
[facility]  RUR OBSERVATION    10;30am  CM called patient's mother about discharge plans. She stated that patient will not be able to go home with her. CM ask if [facility] had given her a list of crisis stabilization agencies. Mother stated that she did not have a list.    11am  CM spoke with [facility]. They have been working with patient but patient is now being seen by the [facility] mental health service. CM called and patient has an appointment scheduled with Dr. Eric Donovan for 1/7 at [place] [address].  This is a telephone appointment. 12:30pm  CM called PCP office and left message for a return call to schedule PCP f/u appointment. 2pm  CM called and spoke with [facility] stabilization. Lalito Rojas stated that they will unable to provide services for patient as he is COVID positive. 3pm  CM assisted patient in calling the Homeless Hotline and patient was able to provide information for completion of intake process. 3:15pm  CM received call from patient's mother stating that she had reached out to patient's skill builder agency ( [employer]) and was working with Grace Solis [phone] on crisis stabilization housing. They have secured housing at [address] [place] [address] on [address] [place] [address]. Mother also stated that she has all of patient's medications and will deliver them to the hotel this evening. 3:45pm  CM called [facility] and secured transportation for 6:30pm.      4:15pm  CM received call from Mr. Karin Mauro that there were no rooms available at the [address] and he would try to secure another location. He will call back with plans for d/c tomorrow. CM called Dr. Meena Ocasio to inform her of change in discharge. CM called [facility] to cancel transport. CM received call from Mr. Karin Mauro that he has been able to secure a room for patient tomorrow at the [address]   Patient will need to be there at 2pm.    5:45pm.  CM called [facility] to inform them of change in discharge. Transportation rescheduled for tomorrow.    time is 1pm.  .  BRANT Garvey/HARVEY  [phone]

## 2022-01-05 NOTE — DISCHARGE SUMMARY
Hospitalist Discharge Summary     Patient ID:  Aleksey Tijerina  [id number]  89 y.o.  1995    PCP on record: Marah Aguirre NP    Admit date: 1/2/2022  Discharge date and time: 1/5/2022      Admission Diagnoses: COVID-19 [U07.1]    Discharge Diagnoses: Active Problems:    COVID-19 (1/3/2022)           Hospital Course:       Suicidal ideation  Schizophrenia  Seen by inpatient psychiatry. Home meds restarted. Does not need inpatient psych per psychiatry assessment. Discontinue suicide precautions, 1:1.          COVID-19 positive  Asymptomatic. CXR did not show acute process. CONSULTATIONS:  IP CONSULT TO PSYCHIATRY  IP CONSULT TO PSYCHIATRY    Excerpted HPI from H&P of Will Virgen MD:  History and physical telemedicine     Chief complaint suicidal ideation     History of present illness  22-year-old male with history of schizophrenia, Asperger presented to hospital with suicidal ideation. Apparently he lives in a group home and left ear not feeling safe. He states he has been dealing with a lot of stressors including being homeless. Patient was evaluated by psychiatry team in the ER and plan for inpatient admission however his COVID result returned positive. Patient to be admitted to the hospitalist team for monitoring. Patient seen in telemedicine video evaluation is not in any acute visible distress, appears calm. Answering questions appropriately. Vital signs afebrile, 88, 16, 128/71, 98% room air. Patient states he thinks he may have had a fever a few weeks ago but otherwise denies any malaise, shortness of breath, cough, chest pain.   Lab work-up CBC is benign, chemistry benign, urinalysis negative, bacteria negative, alcohol negative, urine drug screen negative, flu screen negative, patient to be admitted to the hospital.    ______________________________________________________________________  DISCHARGE SUMMARY/HOSPITAL COURSE:  for full details see H&P, daily progress notes, labs, consult notes. Visit Vitals  BP (!) 102/55   Pulse 71   Temp 98.5 °F (36.9 °C)   Resp 17   Ht 6' 3\" (1.905 m)   Wt 76.7 kg (169 lb)   SpO2 96%   BMI 21.12 kg/m²       _______________________________________________________________________  Patient seen and examined by me on discharge day. Pertinent Findings:  Gen:    Not in distress  Chest: Clear lungs  CVS:   Regular rhythm. No edema  Abd:  Soft, not distended, not tender  Neuro:  Alert with good insight. Oriented to person, place, and time   _______________________________________________________________________  DISCHARGE MEDICATIONS:   Current Discharge Medication List      CONTINUE these medications which have CHANGED    Details   hydrOXYzine pamoate (VISTARIL) 50 mg capsule Take 1 Capsule by mouth every eight (8) hours as needed for Anxiety. Indications: anxious  Qty: 30 Capsule, Refills: 0  Start date: 1/5/2022      benztropine (COGENTIN) 1 mg tablet Take 1 Tablet by mouth two (2) times a day. Indications: extrapyramidal symptoms as a result of taking the medication  Qty: 60 Tablet, Refills: 0  Start date: 1/5/2022      sertraline (Zoloft) 100 mg tablet Take 1 Tablet by mouth daily. Qty: 30 Tablet, Refills: 0  Start date: 1/5/2022      busPIRone (BUSPAR) 15 mg tablet Take 1 Tablet by mouth three (3) times daily. Qty: 30 Tablet, Refills: 0  Start date: 1/5/2022      lurasidone (Latuda) 40 mg tab tablet Take 1 Tablet by mouth daily (with breakfast). Qty: 30 Tablet, Refills: 0  Start date: 1/5/2022      QUEtiapine SR (SEROquel XR) 300 mg sr tablet Take 1 Tablet by mouth nightly.   Qty: 30 Tablet, Refills: 0  Start date: 1/5/2022         STOP taking these medications       QUEtiapine (SEROquel) 50 mg tablet Comments:   Reason for Stopping:               My Recommended Diet, Activity, Wound Care, and follow-up labs are listed in the patient's Discharge Insturctions which I have personally completed and reviewed.     _______________________________________________________________________  DISPOSITION:     Home with Family: x   Home with HH/PT/OT/RN:    SNF/LTC:    JERROD:    OTHER:        Condition at Discharge:  Stable  _______________________________________________________________________  Follow up with:   PCP : Yari Meza NP  Follow-up Information     Follow up With Specialties Details Why Contact Info    [place] [id number] [phone]    Abner Negron NP Nurse Practitioner   [address] [place] [address]    Address: [facility], [address]   Phone: [phone]    [facility]  On 1/7/2022 You have a telephone appointment scheduled for 11am with Dr. Sherita Kelly.  , Please keep this appointment [facility], [facility]  [phone]              Total time in minutes spent coordinating this discharge (includes going over instructions, follow-up, prescriptions, and preparing report for sign off to her PCP) : 35 minutes    Signed:  Kendra Johnson MD

## 2022-01-05 NOTE — PROGRESS NOTES
Problem: Patient Education:  Go to Education Activity  Goal: Patient/Family Education  Outcome: Resolved/Met     Problem: Airway Clearance - Ineffective  Goal: Achieve or maintain patent airway  Outcome: Resolved/Met     Problem: Gas Exchange - Impaired  Goal: Absence of hypoxia  Outcome: Resolved/Met  Goal: Promote optimal lung function  Outcome: Resolved/Met     Problem: Breathing Pattern - Ineffective  Goal: Ability to achieve and maintain a regular respiratory rate  Outcome: Resolved/Met     Problem: Body Temperature -  Risk of, Imbalanced  Goal: Ability to maintain a body temperature within defined limits  Outcome: Resolved/Met  Goal: Will regain or maintain usual level of consciousness  Outcome: Resolved/Met  Goal: Complications related to the disease process, condition or treatment will be avoided or minimized  Outcome: Resolved/Met     Problem: Isolation Precautions - Risk of Spread of Infection  Goal: Prevent transmission of infectious organism to others  Outcome: Resolved/Met     Problem: Nutrition Deficits  Goal: Optimize nutrtional status  Outcome: Resolved/Met     Problem: Risk for Fluid Volume Deficit  Goal: Maintain normal heart rhythm  Outcome: Resolved/Met  Goal: Maintain absence of muscle cramping  Outcome: Resolved/Met  Goal: Maintain normal serum potassium, sodium, calcium, phosphorus, and pH  Outcome: Resolved/Met     Problem: Loneliness or Risk for Loneliness  Goal: Demonstrate positive use of time alone when socialization is not possible  Outcome: Resolved/Met     Problem: Fatigue  Goal: Verbalize increase energy and improved vitality  Outcome: Resolved/Met     Problem: Patient Education: Go to Patient Education Activity  Goal: Patient/Family Education  Outcome: Resolved/Met     Problem: Falls - Risk of  Goal: *Absence of Falls  Description: Document Stu Fall Risk and appropriate interventions in the flowsheet.   Outcome: Resolved/Met  Note: Fall Risk Interventions:            Medication Interventions: Patient to call before getting OOB,Teach patient to arise slowly                   Problem: Patient Education: Go to Patient Education Activity  Goal: Patient/Family Education  Outcome: Resolved/Met

## 2022-01-06 VITALS
OXYGEN SATURATION: 99 % | TEMPERATURE: 98.8 F | WEIGHT: 169 LBS | RESPIRATION RATE: 16 BRPM | HEIGHT: 75 IN | DIASTOLIC BLOOD PRESSURE: 59 MMHG | BODY MASS INDEX: 21.01 KG/M2 | SYSTOLIC BLOOD PRESSURE: 118 MMHG | HEART RATE: 73 BPM

## 2022-01-06 PROCEDURE — G0378 HOSPITAL OBSERVATION PER HR: HCPCS

## 2022-01-06 PROCEDURE — 74011250637 HC RX REV CODE- 250/637: Performed by: INTERNAL MEDICINE

## 2022-01-06 PROCEDURE — 74011250637 HC RX REV CODE- 250/637: Performed by: PSYCHIATRY & NEUROLOGY

## 2022-01-06 RX ADMIN — ZINC SULFATE 220 MG (50 MG) CAPSULE 1 CAPSULE: CAPSULE at 08:11

## 2022-01-06 RX ADMIN — BUSPIRONE HYDROCHLORIDE 15 MG: 10 TABLET ORAL at 08:10

## 2022-01-06 RX ADMIN — BENZTROPINE MESYLATE 1 MG: 1 TABLET ORAL at 08:11

## 2022-01-06 RX ADMIN — OXYCODONE HYDROCHLORIDE AND ACETAMINOPHEN 1000 MG: 500 TABLET ORAL at 08:11

## 2022-01-06 RX ADMIN — Medication 2000 UNITS: at 08:10

## 2022-01-06 RX ADMIN — LURASIDONE HYDROCHLORIDE 40 MG: 20 TABLET, FILM COATED ORAL at 08:10

## 2022-01-06 RX ADMIN — SERTRALINE HYDROCHLORIDE 100 MG: 50 TABLET ORAL at 08:11

## 2022-01-06 NOTE — PROGRESS NOTES
0) Verbal shift change report given to Petty Reid, ILIR and Evaristo Ribeiro LPN (oncoming nurse) by Nikky Smith RN (offgoing nurse). Report included the following information SBAR, Kardex, Intake/Output and MAR.     0800) Pt assessed and vital signs stable. Pt has no c/o pain at this time. Discharge plan reviewed and pt stated understanding. Morning meds given and pt left resting in bed at this time. [phone]) Soda provided per request. Pt left resting in bed talking on the phone at this time. 1025) Interdisciplinary team rounds were held 1/6/2022 with the following team members:Care Management, Nursing, Pharmacy and Physician. Plan of care discussed. See clinical pathway and/or care plan for interventions and desired outcomes. 1100) Ice cream and soda provided per request. Pt expressed concerns about  time for 1300, but not being able to check in until 1500. Contacted CM and left message. 1210) Reviewed discharge instructions with pt including follow-up appointments, new medications and side effects, medications to continue, medications to discontinue, suicide resources, signs/symptoms of stroke and heart attack, and MyChart information. Pt expressed understanding. Belongings sent home with pt.     [phone]) Pt taken down to discharge via wheelchair with Daniel Roy.

## 2022-01-06 NOTE — PROGRESS NOTES
Problem: Discharge Planning  Goal: *Discharge to safe environment  1/6/2022 1231 by Brooks Morgan RN  Outcome: Resolved/Met  1/6/2022 1118 by Brooks Morgan RN  Outcome: Progressing Towards Goal  Goal: *Knowledge of medication management  1/6/2022 1231 by Brooks Morgan RN  Outcome: Resolved/Met  1/6/2022 1118 by Brooks Morgan RN  Outcome: Progressing Towards Goal  Goal: *Knowledge of discharge instructions  1/6/2022 1231 by Brooks Morgan RN  Outcome: Resolved/Met  1/6/2022 1118 by Brooks Morgan RN  Outcome: Progressing Towards Goal     Problem: Patient Education: Go to Patient Education Activity  Goal: Patient/Family Education  Outcome: Resolved/Met

## 2022-01-06 NOTE — PROGRESS NOTES
1930-   shift change report given to Paola Pierce RN  (oncoming nurse) by Jailyn Young RN  (offgoing nurse). Report included the following information SBKota [facility]IAN[facility], [facility]    Patient requested snacks at HS. No complaints. Pending discharge this am.     No labs this am.     0730-   shift change report given to Bobby Johnston RN  (oncoming nurse) by Paola Pierce RN  (offgoing nurse).  Report included the following information SBLove FABIAN, [facility]

## 2022-01-06 NOTE — PROGRESS NOTES
0) Verbal shift change report given to Cristal Gao LPN and Gilmar Moya RN (oncoming nurse) by Herman Lewis, RN (offgoing nurse). Report included the following information SBAR, Kardex, Intake/Output and MAR.    0800) Pt assess and vital signs stable. Pt c/o of no pain at this time, scheduled meds were given. Pt stated no additional needs at this time. Pt left resting in bed at this time. 1050) Pt requested ice cream and soda. Pt stated no additional needs at this time. Pt left laying in bed.     1210) Reviewed discharge instructions with pt & Keyana HAZEL, including follow-up appointments, new medications and side effects, medications to continue, medications to discontinue, suicide resources, signs/symptoms of stroke and heart attack, and MyChart information. Pt expressed understanding. Pt belongings with the pt. Awaiting pt's ride. Pt stated no additional needs at this time.

## 2022-01-06 NOTE — CONSULTS
PSYCHIATRY CONSULT NOTE:    REASON FOR CONSULT:  Schizophrenia  suicidal ideation    HISTORY OF PRESENTING COMPLAINT:  Gaviota Jonas is a 32 y.o. male  with history of schizophrenia, Asperger presented to hospital with suicidal ideation. Apparently he lives in a group home and left there not feeling safe. He states he has been dealing with a lot of stressors including being homeless. Patient was evaluated by psychiatry team in the ER and plan for inpatient admission however his COVID result returned positive. Patient to be admitted to the hospitalist team for monitoring. Patient seen in telemedicine video evaluation is not in any acute visible distress, appears calm. Answering questions appropriately. Vital signs afebrile, 88, 16, 128/71, 98% room air. Patient states he thinks he may have had a fever a few weeks ago but otherwise denies any malaise, shortness of breath, cough, chest pain. Lab work-up CBC is benign, chemistry benign, urinalysis negative, bacteria negative, alcohol negative, urine drug screen negative, flu screen negative, patient to be admitted to the hospital.    01/04 - Gaviota oJnas reports feeling alright but notes the voices were a little more loud today. He was listening to [employer] music at the time. Reports that it helps to defray the voices telling him to harm himself. He feels safe in the hospital and does not want to follow the commands. Moods are alright. Denies SI/HI/VH. No aggression or violence. Appropriately interactive and aware. Tolerating medications well. Eating and sleeping fairly. 01/06 - Gaviota Jonas reports feeling well and moods are good. Denies SI/HI/AH/VH. No aggression or violence. Appropriately interactive and aware. Tolerating medications well. Eating and sleeping fairly. PAST PSYCHIATRIC HISTORY:  Therapy, Out Patient aspergers and In Patient Schizophrenia. Compliant with medications. Prior hospitalizations.     SUBSTANCE ABUSE HISTORY:  Social History     Substance and Sexual Activity   Drug Use Never     Social History     Substance and Sexual Activity   Alcohol Use Never     Social History     Tobacco Use   Smoking Status Never Smoker   Smokeless Tobacco Never Used       PAST MEDICAL HISTORY:  Past Medical History:   Diagnosis Date    Asperger syndrome     Schizophrenia ([address]     Scoliosis        SOCIAL HISTORY:  Homeless now, but reports being put out of his group home due disagreements  Denies drugs, ETOH, Cigarettes. Has a  at the local [facility].     VITALS:  Visit Vitals  BP (!) 118/59 (BP 1 Location: Right upper arm, BP Patient Position: Lying)   Pulse 73   Temp 98.8 °F (37.1 °C)   Resp 16   Ht 6' 3\" (1.905 m)   Wt 76.7 kg (169 lb)   SpO2 99%   BMI 21.12 kg/m²       MEDICATIONS:    Current Facility-Administered Medications:     QUEtiapine SR (SEROquel XR) TABLET 400 mg, 400 mg, Oral, QHS, José Greer MD, 400 mg at 01/05/22 2200    lurasidone (LATUDA) tablet 40 mg, 40 mg, Oral, DAILY WITH BREAKFAST, José Greer MD, 40 mg at 01/06/22 0810    benztropine (COGENTIN) tablet 1 mg, 1 mg, Oral, BID, Jayce Silva MD, 1 mg at 01/06/22 0811    busPIRone (BUSPAR) tablet 15 mg, 15 mg, Oral, TID, Jayce Silva MD, 15 mg at 01/06/22 0810    sertraline (ZOLOFT) tablet 100 mg, 100 mg, Oral, DAILY, Chandu Silva MD, 100 mg at 01/06/22 3333    acetaminophen (TYLENOL) tablet 650 mg, 650 mg, Oral, Q6H PRN **OR** acetaminophen (TYLENOL) suppository 650 mg, 650 mg, Rectal, Q6H PRN, Chandu Silva MD    polyethylene glycol (MIRALAX) packet 17 g, 17 g, Oral, DAILY PRN, Chandu Silva MD    ondansetron (ZOFRAN ODT) tablet 4 mg, 4 mg, Oral, Q8H PRN **OR** ondansetron (ZOFRAN) injection 4 mg, 4 mg, IntraVENous, Q6H PRN, Chandu Silva MD    enoxaparin (LOVENOX) injection 40 mg, 40 mg, SubCUTAneous, DAILY, Chandu Silva MD    ascorbic acid (vitamin C) (VITAMIN C) tablet 1,000 mg, 1,000 mg, Oral, DAILY, Jayce Silva MD, 1,000 mg at 01/06/22 9324    zinc sulfate (ZINCATE) 50 mg zinc (220 mg) capsule 1 Capsule, 1 Capsule, Oral, DAILY, Chandu Silva MD, 1 Capsule at 01/06/22 0811    cholecalciferol (VITAMIN D3) (1000 Units /25 mcg) tablet 2,000 Units, 2,000 Units, Oral, DAILY, Yoselyn Silva MD, 2,000 Units at 01/06/22 0810    MENTAL STATUS EXAM:  Calm, cooperative, clear coherent speech  Oriented in all spheres  Vigilant and Denies SI/HI/AH/vH.  straight forward and simlple  Goal directed and No aggression or violence. ASSESSMENT AND PLAN:  Autistic spectrum disorder and Schizoaffective Disorder, Mental retardation, severity unknown , Problems related to social environment, Housing problems and Other psychosocial or environmental problems  and 61-70 mild symptoms    RECOMMENDATIONS:  Not a candidate for inpatient psychiatry at this time  Behavioral health therapy materials given to work on from social work  Continue Seroquel  mg PO Qhs  Monitor for acute changes.    Disposition planning with social work   Thank you for this consultation    Aurora Sheikh MD  1/6/2022

## 2022-01-06 NOTE — PROGRESS NOTES
Hospitalist Progress Note    NAME: Miriam Villalba   :  1995   MRN:  [id number]   Room Number:  063/36  @ [facility] Hospital Summary: 32 y.o. male whom presented on 2022 with      Assessment / Plan:      Suicidal ideation  Schizophrenia  Seen by inpatient psychiatry. Home meds restarted. Does not need inpatient psych per psychiatry assessment. Discontinue suicide precautions, 1:1.          COVID-19 positive  Asymptomatic. CXR did not show acute process.      Full code   DVT Px: enoxaparin       Subjective:     Chief Complaint / Reason for Physician Visit  \"feeling ok\". Discussed with RN events overnight. Review of Systems:  No fevers, chills, appetite change, cough, sputum production, shortness of breath, dyspnea on exertion, nausea, vomitting, diarrhea, constipation, chest pain, leg edema, abdominal pain, joint pain, rash, itching. Tolerating PT/OT. Tolerating diet. Objective:     VITALS:   Last 24hrs VS reviewed since prior progress note. Most recent are:  Patient Vitals for the past 24 hrs:   Temp Pulse Resp BP SpO2   22 0805 98.8 °F (37.1 °C) 73 16 (!) 118/59 99 %   22 0800 -- -- -- -- 99 %   22 2030 98 °F (36.7 °C) 86 18 123/66 97 %   22 1600 98 °F (36.7 °C) 85 18 (!) 107/54 97 %     No intake or output data in the 24 hours ending 22 1118     PHYSICAL EXAM:  General: WD, WN. Alert, cooperative, no acute distress    EENT:  EOMI. Anicteric sclerae. MMM  Resp:  CTA bilaterally, no wheezing or rales. No accessory muscle use  CV:  Regular  rhythm,  normal S1/S2, no murmurs rubs gallops, No edema  GI:  Soft, Non distended, Non tender. +Bowel sounds  Neurologic:  Alert and oriented X 3, normal speech,   Psych:   Good insight. Not anxious nor agitated  Skin:  No rashes.   No jaundice    Reviewed most current lab test results and cultures  YES  Reviewed most current radiology test results   YES  Review and summation of old records today    NO  Reviewed patient's current orders and MAR    YES  PMH/SH reviewed - no change compared to H&P  ________________________________________________________________________  Care Plan discussed with:    Comments   Patient x    Family      RN x    Care Manager x    Consultant                       x Multidiciplinary team rounds were held today with , nursing, pharmacist and clinical coordinator. Patient's plan of care was discussed; medications were reviewed and discharge planning was addressed. ________________________________________________________________________  Total NON critical care TIME:  25   Minutes    Total CRITICAL CARE TIME Spent:   Minutes non procedure based      Comments   >50% of visit spent in counseling and coordination of care x    ________________________________________________________________________  Sade Horne MD     Procedures: see electronic medical records for all procedures/Xrays and details which were not copied into this note but were reviewed prior to creation of Plan. LABS:  I reviewed today's most current labs and imaging studies. Pertinent labs include:  No results for input(s): WBC, HGB, HCT, PLT, HGBEXT, HCTEXT, PLTEXT in the last 72 hours. No results for input(s): NA, K, CL, CO2, GLU, BUN, CREA, CA, MG, PHOS, ALB, TBIL, TBILI, ALT, INR, INREXT in the last 72 hours.     No lab exists for component: SGOT    Signed: Sade Horne MD

## 2022-01-07 ENCOUNTER — PATIENT OUTREACH (OUTPATIENT)
Dept: CASE MANAGEMENT | Age: 27
End: 2022-01-07

## 2022-01-18 ENCOUNTER — PATIENT OUTREACH (OUTPATIENT)
Dept: CASE MANAGEMENT | Age: 27
End: 2022-01-18

## 2022-01-18 NOTE — PROGRESS NOTES
[facility]/ Suicidal ideation. Called and LM 1/7 and 1/10. Lofflest message sent on 1/10 as well. Tried to reach again today, 1/18, LM. Will close episode as unable to contact the patient.

## 2022-03-15 ENCOUNTER — OFFICE VISIT (OUTPATIENT)
Dept: NEUROLOGY | Age: 27
End: 2022-03-15
Payer: MEDICAID

## 2022-03-15 ENCOUNTER — DOCUMENTATION ONLY (OUTPATIENT)
Dept: NEUROLOGY | Age: 27
End: 2022-03-15

## 2022-03-15 DIAGNOSIS — F41.9 SEVERE ANXIETY: ICD-10-CM

## 2022-03-15 DIAGNOSIS — F90.0 ATTENTION DEFICIT HYPERACTIVITY DISORDER (ADHD), INATTENTIVE TYPE, SEVERE: ICD-10-CM

## 2022-03-15 DIAGNOSIS — F43.10 PTSD (POST-TRAUMATIC STRESS DISORDER): ICD-10-CM

## 2022-03-15 DIAGNOSIS — F45.0 SOMATIZATION DISORDER: ICD-10-CM

## 2022-03-15 DIAGNOSIS — F32.2 SEVERE MAJOR DEPRESSION (HCC): ICD-10-CM

## 2022-03-15 DIAGNOSIS — F25.0 SCHIZOAFFECTIVE DISORDER, BIPOLAR TYPE (HCC): Primary | ICD-10-CM

## 2022-03-15 DIAGNOSIS — F84.0 AUTISM SPECTRUM: ICD-10-CM

## 2022-03-15 DIAGNOSIS — F20.3 UNDIFFERENTIATED SCHIZOPHRENIA (HCC): ICD-10-CM

## 2022-03-15 PROCEDURE — 90832 PSYTX W PT 30 MINUTES: CPT | Performed by: CLINICAL NEUROPSYCHOLOGIST

## 2022-03-15 NOTE — PROGRESS NOTES
Prior to seeing the patient I reviewed the records, including the previously completed report, the records in [place], and any updated visits from other providers since I saw the patient last.      Today, I engaged in a psychoeducational and supportive and cognitive/behavioral psychotherapy session with the patient and his  in person and his mother by telepsychology. I provided psychotherapy in the form of psychoeducation and support with respect to the results of the recent Neuropsychological Evaluation, including discussing individual tests as well as patient's areas of neurocognitive strength versus weakness. We discussed, in detail, the following:     From the actual neurocognitive profile, there is support for a diagnosis of inattentive ADHD. It is a severe problem. He is also showing problems with learning. IQ scores vary from the extremely low to low average range of functioning. Problems with high-level cognitive organization are noted. Executive functioning abilities are normal.  From an emotional standpoint, there is can support for schizoaffective disorder, bipolar type, severe anxiety with somatization, PTSD. Borderline personality traits are also seen. There is also strong neurocognitive evidence of autism spectrum disorder.                 In addition to continued medical care, my recommendations include a review of his current psychiatric medication management for schizoaffective disorder, bipolar type, severe anxiety, severe major depression, and attention deficit. Caution is advised in selecting an appropriate medication for attention for him, given the propensity towards anxiety here. Active engagement in intensive psychotherapy is strongly advised. Consider EMDR. Consider DBT. Any case management and social work with us on daily basis. Consult with Department of aging and rehabilitative services. Consult with OT.   Consider RUPERTO services if available as an adult.  Monitor for any escalation in currently passive suicidal thinking. Baseline now established. Unfortunately, these are likely chronic issues and his prognosis is guarded at best.  With treatment, though, some symptom improvement is expected. Clinical correlation is, of course, indicated. I will discuss these findings with the patient when he follows up with me in the near future. Follow up prn.      DIAGNOSES:  Schizoaffective Disorder, Bipolar Type                          Severe Anxiety                          Somatization                          PTSD                          Borderline Personality in Adult                          Autism Spectrum Disorder, Level 1                          ADHD, Inattentive                          Severe Major Depression      Education was provided regarding my diagnostic impressions, and we discussed treatment plan/options. I also answered numerous questions related to the clinical findings, including discussing various methods to improve cognition and mood. Counseling provided regarding mood and cognition. CBT and supportive psychotherapy techniques were utilized. Supportive/Cognitive Behavioral/Solution Focused psychotherapy provided  Discussed rational versus irrational thinking patterns and their consequences. Discussed healthy/adaptive and unhealthy/maladaptive coping. The patient has been on [place], and doing well. He is on Buspirone also, and Zoloft, too. He is on Buspiron in the middle of the day. He seems to be doing relatively okay, but of course has ongoing mental health. He is still having problems with mood swings and blow ps. Living ingroup home. Violent outbursts is the # 1 harini. Discussed behavioral intervention. The patient had the following concerns which I deferred to their referring provider: meds for mood/cognition    Discussed ID Waiver. Needs secure housing facility and consistency in that regard. Consult psychiatry. Time spent today: 20

## 2022-03-18 PROBLEM — U07.1 COVID-19: Status: ACTIVE | Noted: 2022-01-03

## 2022-03-19 PROBLEM — R10.31 RIGHT GROIN PAIN: Status: ACTIVE | Noted: 2019-04-09

## 2022-03-19 PROBLEM — R68.84 JAW PAIN: Status: ACTIVE | Noted: 2019-04-09

## 2022-03-19 PROBLEM — F84.0 AUTISM: Status: ACTIVE | Noted: 2019-04-10

## 2022-03-19 PROBLEM — F20.9 SCHIZOPHRENIA (HCC): Status: ACTIVE | Noted: 2021-01-17

## 2022-03-19 PROBLEM — F20.3 UNDIFFERENTIATED SCHIZOPHRENIA (HCC): Status: ACTIVE | Noted: 2019-04-09

## 2022-04-11 ENCOUNTER — OFFICE VISIT (OUTPATIENT)
Dept: FAMILY MEDICINE CLINIC | Age: 27
End: 2022-04-11
Payer: MEDICAID

## 2022-04-11 VITALS
BODY MASS INDEX: 22.21 KG/M2 | RESPIRATION RATE: 18 BRPM | HEIGHT: 75 IN | WEIGHT: 178.6 LBS | SYSTOLIC BLOOD PRESSURE: 115 MMHG | HEART RATE: 101 BPM | OXYGEN SATURATION: 98 % | DIASTOLIC BLOOD PRESSURE: 65 MMHG | TEMPERATURE: 98.9 F

## 2022-04-11 DIAGNOSIS — R30.0 DYSURIA: Primary | ICD-10-CM

## 2022-04-11 PROCEDURE — 99213 OFFICE O/P EST LOW 20 MIN: CPT | Performed by: NURSE PRACTITIONER

## 2022-04-11 NOTE — PROGRESS NOTES
Chief Complaint   Patient presents with    Follow-up       1. \"Have you been to the ER, urgent care clinic since your last visit? Hospitalized since your last visit? \" Yes When: 1/2022 Where: [facility] Reason for visit: Psych    2. \"Have you seen or consulted any other health care providers outside of the [address] [place] [address] since your last visit? \" Yes When: 3/2022 Where: [facility] Reason for visit: Psych     3. For patients over 45: Has the patient had a colonoscopy? NA - based on age       1 most recent PHQ Screens 4/11/2022   Little interest or pleasure in doing things Not at all   Feeling down, depressed, irritable, or hopeless Not at all   Total Score PHQ 2 0   Some encounter information is confidential and restricted. Go to Review Flowsheets activity to see all data.      Health Maintenance Due   Topic Date Due    Hepatitis C Screening  Never done    COVID-19 Vaccine (1) Never done    Depression Monitoring  01/17/2022

## 2022-04-11 NOTE — PROGRESS NOTES
Assessment/Plan:     Diagnoses and all orders for this visit:    1. Dysuria  -     URINALYSIS W/ RFLX MICROSCOPIC; Future  -     CT/NG/T.VAGINALIS AMPLIFICATION; Future  -     CULTURE, URINE; Future       Labs pending as above. Exam is unremarkable today. Consider follow-up urology if no improvement. Follow-up and Dispositions    · Return if symptoms worsen or fail to improve. Discussed expected course/resolution/complications of diagnosis in detail with patient. Medication risks/benefits/costs/interactions/alternatives discussed with patient. Pt was given after visit summary which includes diagnoses, current medications & vitals. Pt expressed understanding with the diagnosis and plan          Subjective:      Anne Oro is a 32 y.o. male who presents for had concerns including Follow-up. Hospital Follow Up  Anne Oro is seen for follow up from recent urgent care visit to [facility] on 3/21/2022. We requested the the notes, the records. He presented with testicular pain. He took his antibiotics as directed & without any side effects. He reports symptoms are not changed. Medication Reconciliation reviewed today. He has seen an urologist in the past in 2017 and found to have a testicular cyst which resolved. Patient Active Problem List   Diagnosis Code    Undifferentiated schizophrenia ([facility] [address] F20.3    Jaw pain R68.84    Right groin pain R10.31    Autism F84.0    Schizophrenia ([facility] [address] F20.9    COVID-19 U07.1       Current Outpatient Medications   Medication Sig Dispense Refill    hydrOXYzine pamoate (VISTARIL) 50 mg capsule Take 1 Capsule by mouth every eight (8) hours as needed for Anxiety. Indications: anxious 30 Capsule 0    sertraline (Zoloft) 100 mg tablet Take 1 Tablet by mouth daily. 30 Tablet 0    busPIRone (BUSPAR) 15 mg tablet Take 1 Tablet by mouth three (3) times daily.  30 Tablet 0    lurasidone (Latuda) 40 mg tab tablet Take 1 Tablet by mouth daily (with breakfast). 30 Tablet 0    QUEtiapine SR (SEROquel XR) 300 mg sr tablet Take 1 Tablet by mouth nightly. 30 Tablet 0       Allergies   Allergen Reactions    Aspirin Hives       ROS:   Review of Systems   Constitutional: Negative for malaise/fatigue. Eyes: Negative for blurred vision. Respiratory: Negative for shortness of breath. Cardiovascular: Negative for chest pain. Genitourinary: Negative for dysuria, frequency, hematuria and urgency. Objective:     Visit Vitals  /65 (BP 1 Location: Left upper arm, BP Patient Position: Sitting, BP Cuff Size: Large adult long)   Pulse (!) 101   Temp 98.9 °F (37.2 °C) (Temporal)   Resp 18   Ht 6' 3\" (1.905 m)   Wt 178 lb 9.6 oz (81 kg)   SpO2 98%   BMI 22.32 kg/m²       Vitals and Nurse Documentation reviewed. Physical Exam  Exam conducted with a chaperone present. Constitutional:       General: He is not in acute distress. Cardiovascular:      Heart sounds: S1 normal and S2 normal. No murmur heard. No friction rub. No gallop. Pulmonary:      Effort: No respiratory distress. Breath sounds: Normal breath sounds. Abdominal:      Hernia: There is no hernia in the left inguinal area or right inguinal area. Genitourinary:     Penis: Normal.       Testes: Normal.      Epididymis:      Right: Not inflamed or enlarged. No mass or tenderness. Left: Not inflamed or enlarged. No mass or tenderness. Lymphadenopathy:      Lower Body: No right inguinal adenopathy. No left inguinal adenopathy. Skin:     General: Skin is warm and dry.    Psychiatric:         Mood and Affect: Mood and affect normal.

## 2022-04-12 LAB
APPEARANCE UR: CLEAR
BILIRUB UR QL: NEGATIVE
COLOR UR: NORMAL
GLUCOSE UR STRIP.AUTO-MCNC: NEGATIVE MG/DL
HGB UR QL STRIP: NEGATIVE
KETONES UR QL STRIP.AUTO: NEGATIVE MG/DL
LEUKOCYTE ESTERASE UR QL STRIP.AUTO: NEGATIVE
NITRITE UR QL STRIP.AUTO: NEGATIVE
PH UR STRIP: 8 [PH] (ref 5–8)
PROT UR STRIP-MCNC: NEGATIVE MG/DL
SP GR UR REFRACTOMETRY: 1.02 (ref 1–1.03)
UROBILINOGEN UR QL STRIP.AUTO: 0.2 EU/DL (ref 0.2–1)

## 2022-04-13 LAB
BACTERIA SPEC CULT: NORMAL
SERVICE CMNT-IMP: NORMAL

## 2022-04-14 LAB
C TRACH RRNA SPEC QL NAA+PROBE: NEGATIVE
N GONORRHOEA RRNA SPEC QL NAA+PROBE: NEGATIVE
SPECIMEN SOURCE: NORMAL
T VAGINALIS RRNA SPEC QL NAA+PROBE: NEGATIVE

## 2022-05-24 ENCOUNTER — TELEPHONE (OUTPATIENT)
Dept: FAMILY MEDICINE CLINIC | Age: 27
End: 2022-05-24

## 2022-05-24 NOTE — TELEPHONE ENCOUNTER
Patient' mother called checking the status of a health screen form she left for the provider to fill out.     Requesting a call back    Best call back #[phone]

## 2022-06-07 NOTE — TELEPHONE ENCOUNTER
Kvng Sinha calling to get Certificate of health signed form will be re faxed and placed in providers mail box

## 2022-06-10 NOTE — TELEPHONE ENCOUNTER
Gulshan Baptiste called to check on status of health certificate form. Stated that she had requested to have the form completed for over a month. She will refax form. Form received and to given to nurse Reinaldo Brock.

## 2022-06-10 NOTE — TELEPHONE ENCOUNTER
Chief Complaint   Patient presents with    Other    Form Completion     [facility]     Forms completed and signed by [facility], NP. Forms faxed to Patrica Jean Baptiste at [phone] with confirmation received.   Paloma Cook LPN

## 2022-06-15 ENCOUNTER — TELEPHONE (OUTPATIENT)
Dept: FAMILY MEDICINE CLINIC | Age: 27
End: 2022-06-15

## 2022-06-15 NOTE — TELEPHONE ENCOUNTER
Called stated they been going back and forth with a form that they faxed over for provider to fill out still incomplete. Stated this has been going on for a month and they need form faxed over immediately.     Fax records [phone]    Best call back #[phone]

## 2022-06-17 NOTE — TELEPHONE ENCOUNTER
Form was completed by Susana and faxed by [place]. Mom brought in another form to be completed and he is being done by Georgette Cabrera and Raymon Gray.

## 2022-06-17 NOTE — TELEPHONE ENCOUNTER
Mom in building and stated that form needed to be up dated and  hand written. Being that Susana CUNNINGHAM was not in building. Writer discussed paper work with Dr. Joe Barba. Dr. Joe Barba updated and form was hand written and faxed, confirmed and scanned. Called, left vm for pt to return call to office. Message left that form completed and Faxed.

## 2022-08-09 ENCOUNTER — OFFICE VISIT (OUTPATIENT)
Dept: FAMILY MEDICINE CLINIC | Age: 27
End: 2022-08-09
Payer: MEDICAID

## 2022-08-09 VITALS
BODY MASS INDEX: 23 KG/M2 | HEIGHT: 75 IN | HEART RATE: 86 BPM | TEMPERATURE: 97.7 F | SYSTOLIC BLOOD PRESSURE: 130 MMHG | OXYGEN SATURATION: 97 % | DIASTOLIC BLOOD PRESSURE: 84 MMHG | RESPIRATION RATE: 16 BRPM | WEIGHT: 185 LBS

## 2022-08-09 DIAGNOSIS — Z00.00 ROUTINE GENERAL MEDICAL EXAMINATION AT A HEALTH CARE FACILITY: Primary | ICD-10-CM

## 2022-08-09 DIAGNOSIS — Z11.1 SCREENING-PULMONARY TB: ICD-10-CM

## 2022-08-09 PROCEDURE — 99395 PREV VISIT EST AGE 18-39: CPT | Performed by: NURSE PRACTITIONER

## 2022-08-09 RX ORDER — DIVALPROEX SODIUM 500 MG/1
1000 TABLET, EXTENDED RELEASE ORAL
COMMUNITY
Start: 2022-07-14

## 2022-08-09 RX ORDER — QUETIAPINE FUMARATE 200 MG/1
200 TABLET, FILM COATED ORAL EVERY EVENING
COMMUNITY
Start: 2022-03-14

## 2022-08-09 RX ORDER — QUETIAPINE FUMARATE 200 MG/1
200 TABLET, FILM COATED ORAL EVERY EVENING
COMMUNITY
Start: 2022-07-24 | End: 2022-08-09

## 2022-08-09 RX ORDER — DIVALPROEX SODIUM 500 MG/1
1000 TABLET, EXTENDED RELEASE ORAL DAILY
COMMUNITY
Start: 2022-07-15 | End: 2022-08-09

## 2022-08-09 RX ORDER — LURASIDONE HYDROCHLORIDE 120 MG/1
120 TABLET, FILM COATED ORAL
COMMUNITY
Start: 2022-07-25

## 2022-08-09 NOTE — PROGRESS NOTES
Assessment/Plan:     Diagnoses and all orders for this visit:    1. Routine general medical examination at a health care facility    2. Screening-pulmonary TB  -     QUANTIFERON-TB GOLD PLUS           He is up to date on routine care. TB today as required by his group home. Follow-up and Dispositions    Return in about 1 year (around 8/9/2023) for Complete Physical.         Discussed expected course/resolution/complications of diagnosis in detail with patient. Medication risks/benefits/costs/interactions/alternatives discussed with patient. Pt was given after visit summary which includes diagnoses, current medications & vitals. Pt expressed understanding with the diagnosis and plan    HPI:   Patricia Valencia is a 32 y.o. male who presents for annual exam.    He has been relocated to a new group home which has improved his quality of life. He is accompanied by his mom today. He continues to work with psychiatry to improve his auditory hallucinations. Current Outpatient Medications   Medication Sig Dispense Refill    divalproex ER (DEPAKOTE ER) 500 mg ER tablet Take 1,000 mg by mouth nightly. Latuda 120 mg tab tablet Take 120 mg by mouth nightly. QUEtiapine (SEROquel) 200 mg tablet Take 200 mg by mouth every evening.      hydrOXYzine pamoate (VISTARIL) 50 mg capsule Take 1 Capsule by mouth every eight (8) hours as needed for Anxiety. Indications: anxious 30 Capsule 0    sertraline (Zoloft) 100 mg tablet Take 1 Tablet by mouth daily. 30 Tablet 0    busPIRone (BUSPAR) 15 mg tablet Take 1 Tablet by mouth three (3) times daily.  30 Tablet 0      Allergies   Allergen Reactions    Latex Hives    Aspirin Hives    Lactose Diarrhea      Patient Active Problem List   Diagnosis Code    Undifferentiated schizophrenia ([facility] [address] F20.3    Jaw pain R68.84    Right groin pain R10.31    Autism F84.0    Schizophrenia ([facility] [address] F20.9    COVID-19 U07.1     Past Medical History:   Diagnosis Date    Asperger syndrome     [facility]     Scoliosis       Past Surgical History:   Procedure Laterality Date    HX CIRCUMCISION  1995    HX HERNIA REPAIR      as an infant     HX HERNIA REPAIR Bilateral 09/1995      No LMP for male patient. Family History   Problem Relation Age of Onset    No Known Problems Mother     No Known Problems Father       Social History     Socioeconomic History    Marital status: SINGLE     Spouse name: Not on file    Number of children: Not on file    Years of education: Not on file    Highest education level: Not on file   Occupational History    Not on file   Tobacco Use    Smoking status: Never    Smokeless tobacco: Never   Vaping Use    Vaping Use: Never used   Substance and Sexual Activity    Alcohol use: Never    Drug use: Never    Sexual activity: Never   Other Topics Concern    Not on file   Social History Narrative    Lives with mom    Goes to day program     Social Determinants of Health     Financial Resource Strain: Not on file   Food Insecurity: Not on file   Transportation Needs: Not on file   Physical Activity: Not on file   Stress: Not on file   Social Connections: Not on file   Intimate Partner Violence: Not on file   Housing Stability: Not on file        ROS:     Review of Systems   Constitutional:  Negative for fever, malaise/fatigue and weight loss. HENT:  Negative for hearing loss. Eyes:  Negative for blurred vision and pain. Respiratory:  Negative for cough and shortness of breath. Cardiovascular:  Negative for chest pain, palpitations and leg swelling. Gastrointestinal:  Negative for abdominal pain, blood in stool, constipation, diarrhea and melena. Genitourinary:  Negative for dysuria and hematuria. Musculoskeletal:  Negative for joint pain. Skin:  Negative for rash. Neurological:  Negative for headaches. Psychiatric/Behavioral:  Negative for depression. The patient is not nervous/anxious and does not have insomnia.       Physical Exam:   Visit Vitals  /84 (BP 1 Location: Right arm, BP Patient Position: Sitting, BP Cuff Size: Adult)   Pulse 86   Temp 97.7 °F (36.5 °C) (Temporal)   Resp 16   Ht 6' 3\" (1.905 m)   Wt 185 lb (83.9 kg)   SpO2 97%   BMI 23.12 kg/m²        Vital signs and nurse documentation reviewed. Physical Exam  Constitutional:       General: He is not in acute distress. HENT:      Right Ear: No drainage. No middle ear effusion. Tympanic membrane is not injected, erythematous or bulging. Left Ear: No drainage. No middle ear effusion. Tympanic membrane is not injected, erythematous or bulging. Eyes:      Pupils: Pupils are equal, round, and reactive to light. Neck:      Trachea: No tracheal deviation. Cardiovascular:      Pulses:           Dorsalis pedis pulses are 2+ on the right side and 2+ on the left side. Posterior tibial pulses are 2+ on the right side and 2+ on the left side. Heart sounds: S1 normal and S2 normal. No murmur heard. No friction rub. No gallop. Pulmonary:      Breath sounds: Normal breath sounds. No wheezing. Abdominal:      General: Bowel sounds are normal. There is no distension. Palpations: Abdomen is soft. There is no mass. Tenderness: There is no abdominal tenderness. Lymphadenopathy:      Cervical: No cervical adenopathy. Skin:     General: Skin is warm and dry. Neurological:      Cranial Nerves: No cranial nerve deficit. Sensory: Sensation is intact. Motor: Motor function is intact.

## 2022-08-18 LAB
GAMMA INTERFERON BACKGROUND BLD IA-ACNC: 0.01 IU/ML
M TB IFN-G BLD-IMP: NEGATIVE
M TB IFN-G CD4+ BCKGRND COR BLD-ACNC: 0.02 IU/ML
MITOGEN IGNF BLD-ACNC: >10 IU/ML
QUANTIFERON INCUBATION, QF1T: NORMAL
QUANTIFERON TB2 AG: 0.03 IU/ML
SERVICE CMNT-IMP: NORMAL

## 2022-09-05 ENCOUNTER — PATIENT MESSAGE (OUTPATIENT)
Dept: FAMILY MEDICINE CLINIC | Age: 27
End: 2022-09-05

## 2022-09-06 RX ORDER — ACETAMINOPHEN 500 MG
500 TABLET ORAL
Qty: 30 TABLET | Refills: 1 | Status: SHIPPED | OUTPATIENT
Start: 2022-09-06 | End: 2022-09-08 | Stop reason: SDUPTHER

## 2022-09-06 NOTE — TELEPHONE ENCOUNTER
From: Malcom Mtz  To: Vick Dodson NP  Sent: 9/5/2022 3:59 PM EDT  Subject: Groin Discomfort    Good Afternoon,    Dr. Jesus Hensley with [facility] did not find any issues. He recommended that I follow up with a back specialist. Would you be willing to prescribe Tylenol for me to take in the interim? I am still experiencing a great deal of pain in my groin area.     Thank you

## 2022-09-08 ENCOUNTER — TELEPHONE (OUTPATIENT)
Dept: FAMILY MEDICINE CLINIC | Age: 27
End: 2022-09-08

## 2022-09-08 RX ORDER — ACETAMINOPHEN 500 MG
500 TABLET ORAL
Qty: 30 TABLET | Refills: 1 | Status: SHIPPED | OUTPATIENT
Start: 2022-09-08

## 2022-09-08 NOTE — TELEPHONE ENCOUNTER
----- Message from [facility] sent at 9/8/2022 12:32 PM EDT -----  Subject: Message to Provider    QUESTIONS  Information for Provider? Patients prescription for acetaminophen needs   sent to [facility]. Please call patients mother when   medication has been sent to correct pharmacy.  ---------------------------------------------------------------------------  --------------  [address] [facility]  [phone]; OK to leave message on voicemail  ---------------------------------------------------------------------------  --------------  SCRIPT ANSWERS  Relationship to Patient? Parent  Representative Name? Claritza Inman  Patient is under 25 and the Parent has custody? No  Is the Representative on the appropriate HIPAA document in Epic?  Yes

## 2022-09-08 NOTE — TELEPHONE ENCOUNTER
Left a voice mail for the mother of the patient informing her that his prescription was sent to the pharmacy.

## 2022-11-30 ENCOUNTER — OFFICE VISIT (OUTPATIENT)
Dept: FAMILY MEDICINE CLINIC | Age: 27
End: 2022-11-30
Payer: MEDICAID

## 2022-11-30 VITALS
OXYGEN SATURATION: 95 % | BODY MASS INDEX: 25.99 KG/M2 | DIASTOLIC BLOOD PRESSURE: 64 MMHG | HEART RATE: 84 BPM | SYSTOLIC BLOOD PRESSURE: 111 MMHG | TEMPERATURE: 99.5 F | WEIGHT: 209 LBS | RESPIRATION RATE: 16 BRPM | HEIGHT: 75 IN

## 2022-11-30 DIAGNOSIS — R10.31 RIGHT INGUINAL PAIN: Primary | ICD-10-CM

## 2022-11-30 DIAGNOSIS — R04.0 EPISTAXIS: ICD-10-CM

## 2022-11-30 PROCEDURE — 99213 OFFICE O/P EST LOW 20 MIN: CPT | Performed by: NURSE PRACTITIONER

## 2022-11-30 RX ORDER — SODIUM CHLORIDE 0.65 %
1 AEROSOL, SPRAY (ML) NASAL DAILY
Qty: 15 ML | Refills: 1 | Status: SHIPPED | OUTPATIENT
Start: 2022-11-30

## 2022-11-30 RX ORDER — OXCARBAZEPINE 300 MG/1
TABLET, FILM COATED ORAL
COMMUNITY
Start: 2022-11-15

## 2022-11-30 NOTE — PROGRESS NOTES
Assessment/Plan:     Diagnoses and all orders for this visit:    1. Right inguinal pain  -     REFERRAL TO ORTHOPEDICS    2. Epistaxis  -     sodium chloride (Saline Mist) 0.65 % nasal squeeze bottle; 0.05 mL by Both Nostrils route daily. Indications: dryness of the nose     Treatment as above. Discussed humidity at night if possible in his group home. If no improvement, consider ENT referral.   Follow-up and Dispositions    Return if symptoms worsen or fail to improve. Discussed expected course/resolution/complications of diagnosis in detail with patient. Medication risks/benefits/costs/interactions/alternatives discussed with patient. Pt was given after visit summary which includes diagnoses, current medications & vitals. Pt expressed understanding with the diagnosis and plan          Subjective:      Phu Randall is a 32 y.o. male who presents for had concerns including Epistaxis and Groin Pain. Reports a recent history of nosebleeds. Happen during periods of anger. Denies a history of similar symptoms. Unfortunately he was recently hospitalized for anger outbursts. He is struggling with control of his schizophrenia. He is accompanied by his mother today. He continues with ongoing right groin pain. Symptoms have been evaluated extensively with urology without cause. He was referred to ortho for evaluation of a sacral etiology however they are not sure how to proceed. Patient Active Problem List   Diagnosis Code    Right groin pain R10.31    Autism F84.0    Schizophrenia ([facility] [address] F20.9    COVID-19 U07.1       Current Outpatient Medications   Medication Sig Dispense Refill    OXcarbazepine (TRILEPTAL) 300 mg tablet       sodium chloride (Saline Mist) 0.65 % nasal squeeze bottle 0.05 mL by Both Nostrils route daily. Indications: dryness of the nose 15 mL 1    polyethylene glycol (MIRALAX) 17 gram packet Take 1 Packet by mouth daily as needed for Constipation.  30 Packet 1 acetaminophen (TYLENOL) 500 mg tablet Take 1 Tablet by mouth every six (6) hours as needed for Pain. 30 Tablet 1    Latuda 120 mg tab tablet Take 120 mg by mouth nightly. QUEtiapine (SEROquel) 200 mg tablet Take 200 mg by mouth every evening.      hydrOXYzine pamoate (VISTARIL) 50 mg capsule Take 1 Capsule by mouth every eight (8) hours as needed for Anxiety. Indications: anxious 30 Capsule 0    sertraline (Zoloft) 100 mg tablet Take 1 Tablet by mouth daily. 30 Tablet 0    busPIRone (BUSPAR) 15 mg tablet Take 1 Tablet by mouth three (3) times daily. 30 Tablet 0       Allergies   Allergen Reactions    Latex Hives    Aspirin Hives    Lactose Diarrhea    Penicillins Hives, Itching and Shortness of Breath       ROS:   Review of Systems   Constitutional:  Negative for malaise/fatigue. HENT:  Positive for nosebleeds. Eyes:  Negative for blurred vision. Respiratory:  Negative for shortness of breath. Cardiovascular:  Negative for chest pain. Objective:   Visit Vitals  /64 (BP 1 Location: Left upper arm, BP Patient Position: Sitting, BP Cuff Size: Large adult long)   Pulse 84   Temp 99.5 °F (37.5 °C)   Resp 16   Ht 6' 3\" (1.905 m)   Wt 209 lb (94.8 kg)   SpO2 95%   BMI 26.12 kg/m²       Vitals and Nurse Documentation reviewed. Physical Exam  Constitutional:       General: He is not in acute distress. Cardiovascular:      Heart sounds: S1 normal and S2 normal. No murmur heard. No friction rub. No gallop. Pulmonary:      Effort: No respiratory distress. Breath sounds: Normal breath sounds. Abdominal:      General: Abdomen is flat. Palpations: Abdomen is soft. Tenderness: There is no abdominal tenderness. Hernia: There is no hernia in the left inguinal area or right inguinal area. Skin:     General: Skin is warm and dry. Psychiatric:         Attention and Perception: He perceives auditory hallucinations.          Mood and Affect: Mood and affect normal.

## 2022-11-30 NOTE — PROGRESS NOTES
Chief Complaint   Patient presents with    Epistaxis    Groin Pain       1. \"Have you been to the ER, urgent care clinic since your last visit? Hospitalized since your last visit? \" Yes When: 11/2022 Where: [address] Reason for visit: Mental Health    2. \"Have you seen or consulted any other health care providers outside of the [address] [place] [address] since your last visit? \" Yes When: 11/9/2022 Where: [facility]      3. For patients over 45: Has the patient had a colonoscopy? NA - based on age       1 most recent PHQ Screens 11/30/2022   Little interest or pleasure in doing things Not at all   Feeling down, depressed, irritable, or hopeless Several days   Total Score PHQ 2 1   Some encounter information is confidential and restricted. Go to Review Flowsheets activity to see all data.      Health Maintenance Due   Topic Date Due    Hepatitis C Screening  Never done    COVID-19 Vaccine (4 - Booster for Moderna series) 02/12/2022    Flu Vaccine (1) 08/01/2022

## 2022-12-02 ENCOUNTER — TELEPHONE (OUTPATIENT)
Dept: FAMILY MEDICINE CLINIC | Age: 27
End: 2022-12-02

## 2022-12-02 NOTE — TELEPHONE ENCOUNTER
----- Message from Ciara sent at 12/1/2022 11:45 AM EST -----  Subject: Message to Provider    QUESTIONS  Information for Provider? group home asking could have updated copy of   pt's most recent physical faxed to group [place]-fax # [phone]  ---------------------------------------------------------------------------  --------------  [address] [employer]  [phone]; OK to leave message on voicemail  ---------------------------------------------------------------------------  --------------  SCRIPT ANSWERS  Relationship to Patient? Third Party  Third Party Type? [address]?    Representative Name? Yokasta Rogers

## 2022-12-02 NOTE — TELEPHONE ENCOUNTER
Called, spoke to [address] [place] [address]   Two pt identifiers confirmed. Informed her paper work faxed and confirmed.    Jane Kuhn LPN

## 2022-12-04 PROBLEM — R68.84 JAW PAIN: Status: RESOLVED | Noted: 2019-04-09 | Resolved: 2022-12-04

## 2022-12-04 PROBLEM — F20.3 UNDIFFERENTIATED SCHIZOPHRENIA (HCC): Status: RESOLVED | Noted: 2019-04-09 | Resolved: 2022-12-04

## 2022-12-22 ENCOUNTER — TRANSCRIBE ORDER (OUTPATIENT)
Dept: SCHEDULING | Age: 27
End: 2022-12-22

## 2022-12-22 DIAGNOSIS — M25.551 RIGHT HIP PAIN: Primary | ICD-10-CM

## 2022-12-22 DIAGNOSIS — R10.31 RIGHT INGUINAL PAIN: ICD-10-CM

## 2022-12-22 DIAGNOSIS — M54.50 LUMBAR PAIN: Primary | ICD-10-CM

## 2022-12-22 DIAGNOSIS — M54.16 LUMBAR RADICULOPATHY: ICD-10-CM

## 2023-01-19 ENCOUNTER — TELEPHONE (OUTPATIENT)
Dept: FAMILY MEDICINE CLINIC | Age: 28
End: 2023-01-19

## 2023-01-19 NOTE — TELEPHONE ENCOUNTER
----- Message from Alexandra Mckeon sent at 1/18/2023  1:11 PM EST -----  Subject: Message to Provider    QUESTIONS  Information for Provider? Emily from [facility] needs orders for OTC or   Rx for cold symptoms. Sinus drainage, cough. Fax orders to [facility] [phone]  ---------------------------------------------------------------------------  --------------  [address] [facility]  [phone]; Do not leave any message, patient will call back for answer  ---------------------------------------------------------------------------  --------------  SCRIPT ANSWERS  Relationship to Patient? Third Party  Third Party Type? Nursing Home? Representative Name?  Emily from [address] [facility] [address]

## 2023-01-20 NOTE — TELEPHONE ENCOUNTER
Attempted to reach Emily from the group home to inform her that Susana has already advised that the patient be seen. There was no answer on the number provided and we were asked not to leave a voice mail.

## 2023-02-03 ENCOUNTER — TRANSCRIBE ORDER (OUTPATIENT)
Dept: SCHEDULING | Age: 28
End: 2023-02-03

## 2023-02-03 DIAGNOSIS — M54.16 LUMBAR RADICULOPATHY: ICD-10-CM

## 2023-02-03 DIAGNOSIS — R10.31 RIGHT INGUINAL PAIN: ICD-10-CM

## 2023-02-03 DIAGNOSIS — M25.551 RIGHT HIP PAIN: Primary | ICD-10-CM

## 2023-02-03 DIAGNOSIS — M54.50 LUMBAR PAIN: Primary | ICD-10-CM

## 2023-02-21 ENCOUNTER — HOSPITAL ENCOUNTER (OUTPATIENT)
Dept: MRI IMAGING | Age: 28
Discharge: HOME | End: 2023-02-21
Attending: ORTHOPAEDIC SURGERY
Payer: MEDICAID

## 2023-02-21 DIAGNOSIS — M54.16 LUMBAR RADICULOPATHY: ICD-10-CM

## 2023-02-21 DIAGNOSIS — M25.551 RIGHT HIP PAIN: ICD-10-CM

## 2023-02-21 DIAGNOSIS — R10.31 RIGHT INGUINAL PAIN: ICD-10-CM

## 2023-02-21 DIAGNOSIS — M54.50 LUMBAR PAIN: ICD-10-CM

## 2023-02-21 PROCEDURE — 72195 MRI PELVIS W/O DYE: CPT

## 2023-02-21 PROCEDURE — 72148 MRI LUMBAR SPINE W/O DYE: CPT

## 2023-04-22 DIAGNOSIS — M25.551 RIGHT HIP PAIN: Primary | ICD-10-CM

## 2023-04-22 DIAGNOSIS — R10.31 RIGHT INGUINAL PAIN: ICD-10-CM

## 2023-04-23 DIAGNOSIS — M54.16 LUMBAR RADICULOPATHY: ICD-10-CM

## 2023-04-23 DIAGNOSIS — M54.50 LUMBAR PAIN: Primary | ICD-10-CM

## 2023-05-26 RX ORDER — QUETIAPINE FUMARATE 200 MG/1
200 TABLET, FILM COATED ORAL EVERY EVENING
COMMUNITY
Start: 2022-03-14

## 2023-05-26 RX ORDER — HYDROXYZINE PAMOATE 50 MG/1
50 CAPSULE ORAL EVERY 8 HOURS PRN
COMMUNITY
Start: 2022-01-05

## 2023-05-26 RX ORDER — OXCARBAZEPINE 300 MG/1
TABLET, FILM COATED ORAL
COMMUNITY
Start: 2022-11-15

## 2023-05-26 RX ORDER — ECHINACEA PURPUREA EXTRACT 125 MG
1 TABLET ORAL DAILY
COMMUNITY
Start: 2022-11-30

## 2023-05-26 RX ORDER — SERTRALINE HYDROCHLORIDE 100 MG/1
100 TABLET, FILM COATED ORAL DAILY
COMMUNITY
Start: 2022-01-05

## 2023-05-26 RX ORDER — BUSPIRONE HYDROCHLORIDE 15 MG/1
15 TABLET ORAL 3 TIMES DAILY
COMMUNITY
Start: 2022-01-05

## 2023-05-26 RX ORDER — LURASIDONE HYDROCHLORIDE 120 MG/1
120 TABLET, FILM COATED ORAL
COMMUNITY
Start: 2022-07-25

## 2023-05-26 RX ORDER — POLYETHYLENE GLYCOL 3350 17 G/17G
17 POWDER, FOR SOLUTION ORAL DAILY PRN
COMMUNITY
Start: 2022-09-23

## 2023-05-26 RX ORDER — ACETAMINOPHEN 500 MG
500 TABLET ORAL EVERY 6 HOURS PRN
COMMUNITY
Start: 2022-09-08 | End: 2023-06-16 | Stop reason: SDUPTHER

## 2023-07-01 NOTE — PROGRESS NOTES
[facility]  RUR OBSERVATION    6:04pm  HARVEY called and left a message with patient's mother for a return call.     BRANT Roth/HARVEY  [phone] Pt was in her room at the start of the shift, calm, pleasant, and cooperative. The patient appeared anxious, fearful, and confused during the shift. The patient denies SI/HI/SIB but reported auditory hallucinations and cannot elaborate on what the voices are saying. Pt was in and out of her room talking and laughing to herself in the milieu. Pt had a shower tonight.PT continues on 1:1 sitter. Will continue to monitor PT.

## 2023-09-24 ENCOUNTER — HOSPITAL ENCOUNTER (INPATIENT)
Facility: HOSPITAL | Age: 28
LOS: 5 days | Discharge: HOME | End: 2023-09-29
Attending: PSYCHIATRY & NEUROLOGY | Admitting: PSYCHIATRY & NEUROLOGY
Payer: COMMERCIAL

## 2023-09-24 DIAGNOSIS — F25.0 SCHIZOAFFECTIVE DISORDER, BIPOLAR TYPE (HCC): Primary | ICD-10-CM

## 2023-09-24 PROBLEM — F25.9 SCHIZOAFFECTIVE DISORDER (HCC): Status: ACTIVE | Noted: 2023-09-24

## 2023-09-24 LAB
ALBUMIN SERPL-MCNC: 4.3 G/DL (ref 3.5–5)
ALBUMIN/GLOB SERPL: 1.2 (ref 1.1–2.2)
ALP SERPL-CCNC: 125 U/L (ref 45–117)
ALT SERPL-CCNC: 48 U/L (ref 12–78)
ANION GAP SERPL CALC-SCNC: 7 MMOL/L (ref 5–15)
AST SERPL-CCNC: 23 U/L (ref 15–37)
BASOPHILS # BLD: 0 K/UL (ref 0–0.1)
BASOPHILS NFR BLD: 0 % (ref 0–1)
BILIRUB SERPL-MCNC: 0.4 MG/DL (ref 0.2–1)
BUN SERPL-MCNC: 13 MG/DL (ref 6–20)
BUN/CREAT SERPL: 10 (ref 12–20)
CALCIUM SERPL-MCNC: 9.2 MG/DL (ref 8.5–10.1)
CHLORIDE SERPL-SCNC: 99 MMOL/L (ref 97–108)
CO2 SERPL-SCNC: 32 MMOL/L (ref 21–32)
CREAT SERPL-MCNC: 1.29 MG/DL (ref 0.7–1.3)
DIFFERENTIAL METHOD BLD: ABNORMAL
EOSINOPHIL # BLD: 0.1 K/UL (ref 0–0.4)
EOSINOPHIL NFR BLD: 1 % (ref 0–7)
ERYTHROCYTE [DISTWIDTH] IN BLOOD BY AUTOMATED COUNT: 12.4 % (ref 11.5–14.5)
ETHANOL SERPL-MCNC: <10 MG/DL (ref 0–0.08)
FLUAV RNA SPEC QL NAA+PROBE: NOT DETECTED
FLUBV RNA SPEC QL NAA+PROBE: NOT DETECTED
GLOBULIN SER CALC-MCNC: 3.6 G/DL (ref 2–4)
GLUCOSE SERPL-MCNC: 104 MG/DL (ref 65–100)
HCT VFR BLD AUTO: 44.3 % (ref 36.6–50.3)
HGB BLD-MCNC: 15 G/DL (ref 12.1–17)
IMM GRANULOCYTES # BLD AUTO: 0 K/UL (ref 0–0.04)
IMM GRANULOCYTES NFR BLD AUTO: 0 % (ref 0–0.5)
LYMPHOCYTES # BLD: 1.3 K/UL (ref 0.8–3.5)
LYMPHOCYTES NFR BLD: 21 % (ref 12–49)
MCH RBC QN AUTO: 28.5 PG (ref 26–34)
MCHC RBC AUTO-ENTMCNC: 33.9 G/DL (ref 30–36.5)
MCV RBC AUTO: 84.1 FL (ref 80–99)
MONOCYTES # BLD: 0.3 K/UL (ref 0–1)
MONOCYTES NFR BLD: 5 % (ref 5–13)
NEUTS SEG # BLD: 4.6 K/UL (ref 1.8–8)
NEUTS SEG NFR BLD: 73 % (ref 32–75)
NRBC # BLD: 0 K/UL (ref 0–0.01)
NRBC BLD-RTO: 0 PER 100 WBC
PLATELET # BLD AUTO: 245 K/UL (ref 150–400)
PMV BLD AUTO: 8.8 FL (ref 8.9–12.9)
POTASSIUM SERPL-SCNC: 4.1 MMOL/L (ref 3.5–5.1)
PROT SERPL-MCNC: 7.9 G/DL (ref 6.4–8.2)
RBC # BLD AUTO: 5.27 M/UL (ref 4.1–5.7)
SARS-COV-2 RNA RESP QL NAA+PROBE: NOT DETECTED
SODIUM SERPL-SCNC: 138 MMOL/L (ref 136–145)
WBC # BLD AUTO: 6.4 K/UL (ref 4.1–11.1)

## 2023-09-24 PROCEDURE — 80053 COMPREHEN METABOLIC PANEL: CPT

## 2023-09-24 PROCEDURE — 1100000000 HC RM PRIVATE

## 2023-09-24 PROCEDURE — 36415 COLL VENOUS BLD VENIPUNCTURE: CPT

## 2023-09-24 PROCEDURE — 99285 EMERGENCY DEPT VISIT HI MDM: CPT

## 2023-09-24 PROCEDURE — 87636 SARSCOV2 & INF A&B AMP PRB: CPT

## 2023-09-24 PROCEDURE — 85025 COMPLETE CBC W/AUTO DIFF WBC: CPT

## 2023-09-24 PROCEDURE — 82077 ASSAY SPEC XCP UR&BREATH IA: CPT

## 2023-09-24 ASSESSMENT — PAIN - FUNCTIONAL ASSESSMENT: PAIN_FUNCTIONAL_ASSESSMENT: NONE - DENIES PAIN

## 2023-09-24 NOTE — ED TRIAGE NOTES
Patient presents to ED in police custody with c/o mental health problem. Patient was at [facility] when Charlie Mercado from [facility] states that his hallucinations have intensified. Patient denies SI/HI.

## 2023-09-25 LAB
AMORPH CRY URNS QL MICRO: ABNORMAL
AMPHET UR QL SCN: NEGATIVE
APPEARANCE UR: ABNORMAL
BACTERIA URNS QL MICRO: NEGATIVE /HPF
BARBITURATES UR QL SCN: NEGATIVE
BENZODIAZ UR QL: NEGATIVE
BILIRUB UR QL: NEGATIVE
CANNABINOIDS UR QL SCN: NEGATIVE
COCAINE UR QL SCN: NEGATIVE
COLOR UR: ABNORMAL
EPITH CASTS URNS QL MICRO: ABNORMAL /LPF
GLUCOSE UR STRIP.AUTO-MCNC: NEGATIVE MG/DL
HGB UR QL STRIP: NEGATIVE
KETONES UR QL STRIP.AUTO: NEGATIVE MG/DL
LEUKOCYTE ESTERASE UR QL STRIP.AUTO: NEGATIVE
Lab: NORMAL
METHADONE UR QL: NEGATIVE
NITRITE UR QL STRIP.AUTO: NEGATIVE
OPIATES UR QL: NEGATIVE
PCP UR QL: NEGATIVE
PH UR STRIP: 7.5 (ref 5–8)
PROT UR STRIP-MCNC: ABNORMAL MG/DL
RBC #/AREA URNS HPF: ABNORMAL /HPF (ref 0–5)
SP GR UR REFRACTOMETRY: 1.02
URINE CULTURE IF INDICATED: ABNORMAL
UROBILINOGEN UR QL STRIP.AUTO: 1 EU/DL (ref 0.2–1)
WBC URNS QL MICRO: ABNORMAL /HPF (ref 0–4)

## 2023-09-25 PROCEDURE — 99223 1ST HOSP IP/OBS HIGH 75: CPT | Performed by: PSYCHIATRY & NEUROLOGY

## 2023-09-25 PROCEDURE — 6370000000 HC RX 637 (ALT 250 FOR IP): Performed by: PSYCHIATRY & NEUROLOGY

## 2023-09-25 PROCEDURE — 1240000000 HC EMOTIONAL WELLNESS R&B

## 2023-09-25 PROCEDURE — 81001 URINALYSIS AUTO W/SCOPE: CPT

## 2023-09-25 PROCEDURE — 80307 DRUG TEST PRSMV CHEM ANLYZR: CPT

## 2023-09-25 PROCEDURE — 6370000000 HC RX 637 (ALT 250 FOR IP): Performed by: NURSE PRACTITIONER

## 2023-09-25 RX ORDER — LURASIDONE HYDROCHLORIDE 40 MG/1
120 TABLET, FILM COATED ORAL
Status: DISCONTINUED | OUTPATIENT
Start: 2023-09-25 | End: 2023-09-29 | Stop reason: HOSPADM

## 2023-09-25 RX ORDER — HALOPERIDOL 5 MG/1
5 TABLET ORAL EVERY 4 HOURS PRN
Status: DISCONTINUED | OUTPATIENT
Start: 2023-09-25 | End: 2023-09-29 | Stop reason: HOSPADM

## 2023-09-25 RX ORDER — HALOPERIDOL 5 MG/ML
5 INJECTION INTRAMUSCULAR EVERY 4 HOURS PRN
Status: DISCONTINUED | OUTPATIENT
Start: 2023-09-25 | End: 2023-09-29 | Stop reason: HOSPADM

## 2023-09-25 RX ORDER — FLUOXETINE HYDROCHLORIDE 40 MG/1
40 CAPSULE ORAL DAILY
Status: ON HOLD | COMMUNITY
End: 2023-09-28 | Stop reason: SDUPTHER

## 2023-09-25 RX ORDER — FLUOXETINE HYDROCHLORIDE 20 MG/1
40 CAPSULE ORAL DAILY
Status: DISCONTINUED | OUTPATIENT
Start: 2023-09-25 | End: 2023-09-29 | Stop reason: HOSPADM

## 2023-09-25 RX ORDER — OXCARBAZEPINE 600 MG/1
600 TABLET, FILM COATED ORAL 2 TIMES DAILY
Status: DISCONTINUED | OUTPATIENT
Start: 2023-09-25 | End: 2023-09-26

## 2023-09-25 RX ORDER — MAGNESIUM HYDROXIDE/ALUMINUM HYDROXICE/SIMETHICONE 120; 1200; 1200 MG/30ML; MG/30ML; MG/30ML
30 SUSPENSION ORAL EVERY 6 HOURS PRN
Status: DISCONTINUED | OUTPATIENT
Start: 2023-09-25 | End: 2023-09-29 | Stop reason: HOSPADM

## 2023-09-25 RX ORDER — POLYETHYLENE GLYCOL 3350 17 G/17G
17 POWDER, FOR SOLUTION ORAL DAILY PRN
Status: DISCONTINUED | OUTPATIENT
Start: 2023-09-25 | End: 2023-09-29 | Stop reason: HOSPADM

## 2023-09-25 RX ORDER — BUSPIRONE HYDROCHLORIDE 10 MG/1
15 TABLET ORAL 3 TIMES DAILY
Status: DISCONTINUED | OUTPATIENT
Start: 2023-09-25 | End: 2023-09-29 | Stop reason: HOSPADM

## 2023-09-25 RX ORDER — QUETIAPINE FUMARATE 200 MG/1
200 TABLET, FILM COATED ORAL EVERY EVENING
Status: DISCONTINUED | OUTPATIENT
Start: 2023-09-25 | End: 2023-09-26

## 2023-09-25 RX ORDER — ACETAMINOPHEN 325 MG/1
650 TABLET ORAL EVERY 4 HOURS PRN
Status: DISCONTINUED | OUTPATIENT
Start: 2023-09-25 | End: 2023-09-29 | Stop reason: HOSPADM

## 2023-09-25 RX ORDER — CLONAZEPAM 0.5 MG/1
0.5 TABLET ORAL DAILY PRN
Status: ON HOLD | COMMUNITY
End: 2023-09-28 | Stop reason: HOSPADM

## 2023-09-25 RX ORDER — HYDROXYZINE HYDROCHLORIDE 25 MG/1
50 TABLET, FILM COATED ORAL 3 TIMES DAILY PRN
Status: DISCONTINUED | OUTPATIENT
Start: 2023-09-25 | End: 2023-09-29 | Stop reason: HOSPADM

## 2023-09-25 RX ORDER — TRAZODONE HYDROCHLORIDE 50 MG/1
50 TABLET ORAL NIGHTLY PRN
Status: DISCONTINUED | OUTPATIENT
Start: 2023-09-25 | End: 2023-09-29 | Stop reason: HOSPADM

## 2023-09-25 RX ORDER — DIPHENHYDRAMINE HYDROCHLORIDE 50 MG/ML
50 INJECTION INTRAMUSCULAR; INTRAVENOUS EVERY 4 HOURS PRN
Status: DISCONTINUED | OUTPATIENT
Start: 2023-09-25 | End: 2023-09-29 | Stop reason: HOSPADM

## 2023-09-25 RX ORDER — MUSCLE RUB CREAM 100; 150 MG/G; MG/G
CREAM TOPICAL PRN
Status: ON HOLD | COMMUNITY
End: 2023-09-28 | Stop reason: SDUPTHER

## 2023-09-25 RX ADMIN — FLUOXETINE 40 MG: 20 CAPSULE ORAL at 16:22

## 2023-09-25 RX ADMIN — LURASIDONE HYDROCHLORIDE 120 MG: 40 TABLET, FILM COATED ORAL at 16:22

## 2023-09-25 RX ADMIN — BUSPIRONE HYDROCHLORIDE 15 MG: 10 TABLET ORAL at 16:22

## 2023-09-25 RX ADMIN — QUETIAPINE FUMARATE 200 MG: 200 TABLET ORAL at 17:48

## 2023-09-25 RX ADMIN — HYDROXYZINE HYDROCHLORIDE 50 MG: 25 TABLET, FILM COATED ORAL at 17:48

## 2023-09-25 RX ADMIN — BUSPIRONE HYDROCHLORIDE 15 MG: 10 TABLET ORAL at 22:09

## 2023-09-25 ASSESSMENT — LIFESTYLE VARIABLES
HOW MANY STANDARD DRINKS CONTAINING ALCOHOL DO YOU HAVE ON A TYPICAL DAY: PATIENT DOES NOT DRINK
HOW OFTEN DO YOU HAVE A DRINK CONTAINING ALCOHOL: NEVER

## 2023-09-25 ASSESSMENT — SLEEP AND FATIGUE QUESTIONNAIRES
AVERAGE NUMBER OF SLEEP HOURS: 8
DO YOU USE A SLEEP AID: NO
DO YOU HAVE DIFFICULTY SLEEPING: NO

## 2023-09-25 NOTE — BH NOTE
SW called Mother, Sánchez Rowley, [phone]. Mother reported that patient goes by \"Chez\". Mother reported that they have not been talking much lately due to mother feeling like she is triggering him. She reported that she has been letting him call her. Mother reported that  told her that patient has been having intense outbursts. The manager then reached out to [address] [place] [address] and get him into their CSU. Mother reported that she felt like the CSU was not equipped to handle the patient. Patient had an outburst and the CSU wanted to put him under a TDO. SW provided TDO education, and explained the commitment. SW reported to Mother that patient is doing well. Mother reported that she spoke to patient's psychiatrist.     Mother confirms that she is patient's POA and not legal guardian. Mother is able to make mental health care decisions. She will find them and send them to SW, or have the home send them over. WHITNEY provided SW TL's email for mother to email them. SW provided patient code so Mother could call patient.    ANDREA Lai
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
## 2023-09-25 NOTE — ED NOTES
[facility] called to check on pt medical clearance. Informed urine is still needed but is medically cleared otherwise. Will continue to monitor.  Pt still notes he cannot urinate at this time     Guerrero Heller RN  09/24/23 0455

## 2023-09-25 NOTE — ED NOTES
According to [facility], waiting on CPD to arrive and get TDO.       Luanne Willis  09/25/23 6934

## 2023-09-25 NOTE — H&P
- 400 K/uL Final    MPV 09/24/2023 8.8 (L)  8.9 - 12.9 FL Final    Nucleated RBCs 09/24/2023 0.0  0  WBC Final    nRBC 09/24/2023 0.00  0.00 - 0.01 K/uL Final    Neutrophils % 09/24/2023 73  32 - 75 % Final    Lymphocytes % 09/24/2023 21  12 - 49 % Final    Monocytes % 09/24/2023 5  5 - 13 % Final    Eosinophils % 09/24/2023 1  0 - 7 % Final    Basophils % 09/24/2023 0  0 - 1 % Final    Immature Granulocytes 09/24/2023 0  0.0 - 0.5 % Final    Neutrophils Absolute 09/24/2023 4.6  1.8 - 8.0 K/UL Final    Lymphocytes Absolute 09/24/2023 1.3  0.8 - 3.5 K/UL Final    Monocytes Absolute 09/24/2023 0.3  0.0 - 1.0 K/UL Final    Eosinophils Absolute 09/24/2023 0.1  0.0 - 0.4 K/UL Final    Basophils Absolute 09/24/2023 0.0  0.0 - 0.1 K/UL Final    Absolute Immature Granulocyte 09/24/2023 0.0  0.00 - 0.04 K/UL Final    Differential Type 09/24/2023 AUTOMATED    Final    Sodium 09/24/2023 138  136 - 145 mmol/L Final    Potassium 09/24/2023 4.1  3.5 - 5.1 mmol/L Final    Chloride 09/24/2023 99  97 - 108 mmol/L Final    CO2 09/24/2023 32  21 - 32 mmol/L Final    Anion Gap 09/24/2023 7  5 - 15 mmol/L Final    Glucose 09/24/2023 104 (H)  65 - 100 mg/dL Final    BUN 09/24/2023 13  6 - 20 MG/DL Final    Creatinine 09/24/2023 1.29  0.70 - 1.30 MG/DL Final    Bun/Cre Ratio 09/24/2023 10 (L)  12 - 20   Final    Est, Glom Filt Rate 09/24/2023 >60  >60 ml/min/1.73m2 Final    Calcium 09/24/2023 9.2  8.5 - 10.1 MG/DL Final    Total Bilirubin 09/24/2023 0.4  0.2 - 1.0 MG/DL Final    ALT 09/24/2023 48  12 - 78 U/L Final    AST 09/24/2023 23  15 - 37 U/L Final    Alk Phosphatase 09/24/2023 125 (H)  45 - 117 U/L Final    Total Protein 09/24/2023 7.9  6.4 - 8.2 g/dL Final    Albumin 09/24/2023 4.3  3.5 - 5.0 g/dL Final    Globulin 09/24/2023 3.6  2.0 - 4.0 g/dL Final    Albumin/Globulin Ratio 09/24/2023 1.2  1.1 - 2.2   Final    SARS-CoV-2, PCR 09/24/2023 Not detected  NOTD   Final    Rapid Influenza A By PCR 09/24/2023 Not detected the setting of external stressors. High emotional component to this presentation; patient is able to voice his concerns in a coherent manner and is apprehensive about coming stressors. Will resume home regimen, provide support and advocate for bereavement counseling in the community. PLAN:  - RESTART Trileptal 600 mg BID for mood lability  - RESTART Seroquel 200 mg QHS for psychosis adjunct, insomnia  - RESTART and RESCHEDULE Latuda to 120 mg ACDINNER for psychosis  - IGM therapy as tolerated  - Expand database / obtain collateral  - Dispo planning (home when stable)       I will obtain labwork for all medications for which routine monitoring is recommended and agents that require intense monitoring for toxicity as deemed appropriate based on current medication side effects and pharmacodynamically determined drug 1/2 lives. A coordinated, multidisplinary treatment team (includes the nurse, unit pharmacist,  and writer) round was conducted for this initial evaluation with the patient present. The following regarding medications was addressed during rounds with patient: the risks and benefits of the proposed medication. The patient was given the opportunity to ask questions. Informed consent given to the use of the above medications. I will continue to adjust psychiatric and non-psychiatric medications (see above \"medication\" section and orders section for details) as deemed appropriate & based upon diagnoses and response to treatment. I have reviewed admission (and previous/old) labs and medical tests in the EHR and or transferring hospital documents. I will continue to order blood tests/labs and diagnostic tests as deemed appropriate and review results as they become available (see orders for details). I have reviewed old psychiatric and medical records available in the EHR.  I Will order additional psychiatric records from other institutions to further elucidate the nature of patient's

## 2023-09-25 NOTE — PLAN OF CARE
Problem: Psychosis  Goal: Will report no hallucinations or delusions  Description: INTERVENTIONS:  1. Administer medication as  ordered  2. Assist with reality testing to support increasing orientation  3.  Assess if patient's hallucinations or delusions are encouraging self harm or harm to others and intervene as appropriate  9/25/2023 0853 by Karmen Taylor RN  Outcome: Progressing

## 2023-09-25 NOTE — ED NOTES
TRANSFER - OUT REPORT:    Verbal report given to Meaghan Ellison rn on Niya Paredes  being transferred to [facility] for routine progression of patient care       Report consisted of patient's Situation, Background, Assessment and   Recommendations(SBAR). Information from the following report(s) ED SBAR, Intake/Output, MAR, and Recent Results was reviewed with the receiving nurse. [place] Assessment:    Presents to emergency department  because of falls (Syncope, seizure, or loss of consciousness): No  Age > 70: No  Altered Mental Status, Intoxication with alcohol or substance confusion (Disorientation, impaired judgment, poor safety awaremess, or inability to follow instructions): No  Impaired Mobility: Ambulates or transfers with assistive devices or assistance; Unable to ambulate or transer.: No  Nursing Judgement: No          Lines:       Opportunity for questions and clarification was provided.       Patient transported with:  Luanne Curiel  09/25/23 6882

## 2023-09-25 NOTE — PLAN OF CARE
Problem: Depression  Goal: Will be euthymic at discharge  Description: INTERVENTIONS:  1. Administer medication as ordered  2. Provide emotional support via 1:1 interaction with staff  3. Encourage involvement in milieu/groups/activities  4. Monitor for social isolation  Outcome: Progressing     Problem: Anxiety  Goal: Will report anxiety at manageable levels  Description: INTERVENTIONS:  1. Administer medication as ordered  2. Teach and rehearse alternative coping skills  3.  Provide emotional support with 1:1 interaction with staff  Outcome: Progressing

## 2023-09-25 NOTE — PROGRESS NOTES
[facility] Admission Pharmacy Medication Reconciliation    Information obtained from: [address]: Yes    Comments/recommendations:  Recommend dosing Lurasidone with a meal to improve bioavailability  The [place] Prescription Monitoring Program () was accessed to determine fill history of any controlled medications. Clonazepam 0.5 mg tablets #30 for 30DS, filled on 9/11/2023    Medication changes (since last review): Added  Fluoxetine  Clonazepam  Muscle rub cream  Removed  Sertraline  Adjusted  Lurasidone frequency changed from daily to nightly   Oxcarbazepine dose change from 300 mg to 600 mg        [address] [place] [address] pharmacy benefit data reflects medications filled and processed through the patient's insurance, however                this data does NOT capture whether the medication was picked up or is currently being taken by the patient. Patient allergies:    Allergies as of 09/24/2023 - Fully Reviewed 09/24/2023   Allergen Reaction Noted    Latex Hives 08/09/2022    Penicillins Hives, Itching, and Shortness Of Breath 11/30/2022    Aspirin Hives 04/09/2019    Lactose Diarrhea 08/09/2022         Prior to Admission Medications   Prescriptions Last Dose Informant   FLUoxetine (PROZAC) 40 MG capsule 9/22/2023 Outside Pharmacy/PCP   Sig: Take 1 capsule by mouth daily   Menthol-Methyl Salicylate (MUSCLE RUB) 10-15 % CREA cream 9/22/2023 Outside Pharmacy/PCP   Sig: Apply topically as needed   OXcarbazepine (TRILEPTAL) 600 MG tablet 9/22/2023 Outside Pharmacy/PCP   Sig: Take 1 tablet by mouth 2 times daily   QUEtiapine (SEROQUEL) 200 MG tablet 9/22/2023 Outside Pharmacy/PCP   Sig: Take 1 tablet by mouth every evening   acetaminophen (TYLENOL) 500 MG tablet 9/22/2023 Outside Pharmacy/PCP   Sig: Take 1 tablet by mouth every 6 hours as needed for Pain   busPIRone (BUSPAR) 15 MG tablet 9/22/2023 Outside Pharmacy/PCP   Sig: Take 15 mg by mouth 3 times daily   clonazePAM (KLONOPIN) 0.5 MG tablet

## 2023-09-25 NOTE — PLAN OF CARE
Problem: Depression  Goal: Will be euthymic at discharge  Description: INTERVENTIONS:  1. Administer medication as ordered  2. Provide emotional support via 1:1 interaction with staff  3. Encourage involvement in milieu/groups/activities  4. Monitor for social isolation  9/25/2023 0355 by Lourena Gaucher, RN  Outcome: Not Progressing  9/25/2023 0354 by Gillian Samaniego RN  Outcome: Progressing     Problem: Psychosis  Goal: Will report no hallucinations or delusions  Description: INTERVENTIONS:  1. Administer medication as  ordered  2. Assist with reality testing to support increasing orientation  3. Assess if patient's hallucinations or delusions are encouraging self harm or harm to others and intervene as appropriate  Outcome: Not Progressing     Problem: Anxiety  Goal: Will report anxiety at manageable levels  Description: INTERVENTIONS:  1. Administer medication as ordered  2. Teach and rehearse alternative coping skills  3.  Provide emotional support with 1:1 interaction with staff  9/25/2023 0355 by Lourena Gaucher, RN  Outcome: Not Progressing  9/25/2023 0354 by Gillian Samaniego RN  Outcome: Progressing

## 2023-09-25 NOTE — CARE COORDINATION
09/25/23 1649   [facility]   Date of Plan 09/25/23   Primary Diagnosis Code Schizoaffective   Barriers to Treatment Need for psychoeducation   Strengths Incorporated in Plan Acknowledging need for assistance; Family supports;Community supports; Natural supports   Plan of Care   Long Term Goal (LTG) Stated in patient/guardian terms Patient will return to group home. Short Term Goal 1   Short Term Goal 1 Client will be oriented to program and staff, and participate in assessment process   Baseline Functioning Patient will need to be involved in discharge planning. Target Patient will be involved in treatment team and discharge planning. Objectives Other (comment)  (Patient will meet with treatment team.)   Intervention 1   (Treatment team will meet with patient.)   Frequency Daily   Measured by Staff observation;Self report   Staff Responsible Clinical staff;[facility] staff   STG Goal 1 Status: Patient Appears to be  Progressing toward treatment plan goal   Short Term Goal 2   Short Term Goal 2 Client will learn and demonstrate effective coping skills   Baseline Functioning Patient does not have effective coping skills. Target Patient will discharge with effective coping skills. Objectives Client will participate in group therapy   Intervention 1 Group therapy; Acknowledge client strengths;Crisis support   Frequency Daily   Measured by Staff observation;Self report   Staff Responsible Clinical staff;[facility] staff   STG Goal 2 Status: Patient Appears to be  Progressing toward treatment plan goal   Crisis/Safety/Discharge Plan   Crisis/Safety Plan Standard program interventions and protocol   Discharge Plan Group home     ANDREA Serrano
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
## 2023-09-25 NOTE — ED NOTES
Jaelyn turner/ [facility] informed that pt has urinated. TDO being petitioned for at this time. Will inform them once urine is resulted.  Pt most likely to go upstairs to [facility] at [place], [place]  09/25/23 0006

## 2023-09-25 NOTE — GROUP NOTE
Group Therapy Note    Date: 9/25/2023    Group Start Time: 1500  Group End Time: 1600  Group Topic: Recreational    [address] [facility]    Silvia Brandt        Group Therapy Note           Patient's Goal:  To concentrate on selected task    Notes:  Pt did not attend session    Discipline Responsible: Recreational Therapist      Signature:  Edgar Burris

## 2023-09-25 NOTE — GROUP NOTE
Group Therapy Note    Date: 9/25/2023    Group Start Time: 1100  Group End Time: 1200  Group Topic: Topic Group    [address] [facility]    Silvia Brandt        Group Therapy Note           Patient's Goal:  To participate in relaxation activity    Notes:  Pt did not attend session    Discipline Responsible: Recreational Therapist      Signature:  Debbie Darby

## 2023-09-25 NOTE — ED NOTES
Asked pt if able to provide urine at this time, stated no. Provided pt with food and beverage, he stated will try to go after he is done drinking.      Aliyah Newman RN  09/24/23 4409

## 2023-09-25 NOTE — BH NOTE
PSYCHOSOCIAL ASSESSMENT  :Patient identifying info:   Niya Paredes is a 29 y.o., male admitted 9/24/2023  6:58 PM     Presenting problem and precipitating factors: Pt presents under TDO after having several episodes at [facility] program reportedly pacing, cussing, posturing, and responding to internal stimuli. Pt behavior demonstrates episodes of escalation in response to triggers including auditory hallucinations. Pt reports waking up and seeing demons. Pt lives in a group home and reports he feels safe there. Pt reports groups in [facility] triggered him as he feels uncomfortable with people he does not know. Mental status assessment: Pt presents ao x 4. Pt presents with flat affect. Pt presents calm and cooperative. Pt endorses AVH. Pt denies SI/HI. Pt insight and judgment appear poor. Strengths/Recreation/Coping Skills:Pt has housing. Pt has income. Pt linked with outpatient services. Pt has insurance. Collateral information: Mother/POA Allen Ferguson [phone]   Frankie Small [phone]    Current psychiatric /substance abuse providers and contact info: Zaheer Schreiber, psychiatrist with [facility], Savanah Dotyuro, [facility], [facility] Providers: Dr Carri Sims, [facility], [facility]    Previous psychiatric/substance abuse providers and response to treatment: Patient has a hx of inpatient psych. Last reported stay was [facility] sometime last year. Family history of mental illness or substance abuse: Not reported    Substance abuse history:    Social History     Tobacco Use    Smoking status: Never    Smokeless tobacco: Never   Substance Use Topics    Alcohol use: Never       History of biomedical complications associated with substance abuse: None    Patient's current acceptance of treatment or motivation for change: Pt under TDO, able to articulate needs    Family constellation: Single, no children    Is significant other involved?  No    Describe support system: Group

## 2023-09-26 PROCEDURE — 1240000000 HC EMOTIONAL WELLNESS R&B

## 2023-09-26 PROCEDURE — 99233 SBSQ HOSP IP/OBS HIGH 50: CPT | Performed by: PSYCHIATRY & NEUROLOGY

## 2023-09-26 PROCEDURE — 6370000000 HC RX 637 (ALT 250 FOR IP): Performed by: PSYCHIATRY & NEUROLOGY

## 2023-09-26 PROCEDURE — 90785 PSYTX COMPLEX INTERACTIVE: CPT | Performed by: PSYCHIATRY & NEUROLOGY

## 2023-09-26 PROCEDURE — 90833 PSYTX W PT W E/M 30 MIN: CPT | Performed by: PSYCHIATRY & NEUROLOGY

## 2023-09-26 RX ORDER — DIVALPROEX SODIUM 500 MG/1
1500 TABLET, EXTENDED RELEASE ORAL
Status: DISCONTINUED | OUTPATIENT
Start: 2023-09-26 | End: 2023-09-29 | Stop reason: HOSPADM

## 2023-09-26 RX ADMIN — BUSPIRONE HYDROCHLORIDE 15 MG: 10 TABLET ORAL at 09:37

## 2023-09-26 RX ADMIN — LURASIDONE HYDROCHLORIDE 120 MG: 40 TABLET, FILM COATED ORAL at 17:25

## 2023-09-26 RX ADMIN — BUSPIRONE HYDROCHLORIDE 15 MG: 10 TABLET ORAL at 21:34

## 2023-09-26 RX ADMIN — DIVALPROEX SODIUM 1500 MG: 500 TABLET, FILM COATED, EXTENDED RELEASE ORAL at 21:34

## 2023-09-26 RX ADMIN — FLUOXETINE 40 MG: 20 CAPSULE ORAL at 09:37

## 2023-09-26 RX ADMIN — BUSPIRONE HYDROCHLORIDE 15 MG: 10 TABLET ORAL at 13:16

## 2023-09-26 NOTE — BH NOTE
PSYCHOTHERAPY SESSION NOTE     Patient Name  Navarro Galvan   Date of Birth 1995   [facility] [id number]   Medical Record Number  [id number]      Age  29 y.o. PCP VERONICA Cisneros NP   Admit date:  9/24/2023    Room Number  200/12  @ [facility]   Date of Service  9/26/2023            Length of psychotherapy session: 30 minutes    Main condition/diagnosis/issues treated during session today, 9/26/2023 : depression    I employed Cognitive Behavioral therapy techniques, Reality-Oriented psychotherapy, as well as supportive psychotherapy in regards to various ongoing psychosocial stressors, including the following: pre-admission and current problems; housing issues; occupational issues; academic issues; legal issues; medical issues; and stress of hospitalization. Interpersonal relationship issues and psychodynamic conflicts explored. Attempts made to alleviate maladaptive patterns. Session focused on the patient's ongoing bereavement and his hopes to return to his group home soon. Overall, patient is progressing. Treatment Plan Update (reviewed and updated 9/26/2023) : I will modify psychotherapy tx plan by implementing more stress management strategies, building upon cognitive behavioral techniques, increasing coping skills, as well as shoring up psychological defenses). An extended energy and skill set was needed to engage pt in psychotherapy due to some of the following: resistiveness, complexity, negativity, confrontational nature, hostile behaviors, and/or severe abnormalities in thought processes/psychosis resulting in the loss of expressive/receptive language communication skills.

## 2023-09-26 NOTE — BH NOTE
E/M Psychiatric Progress Note    Patient: Lawrence Gonzalez MRN: [id number]  SSN: [id number]    YOB: 1995  Age: 29 y.o. Sex: male      Admit Date: 9/24/2023    LOS: 2 days     Chief Complaint: psychosis / SI    Subjective:     HPI / INTERVAL HISTORY:    The patient, Lawrence Gonzalez, is a 29 y.o. More than one Race male with a past psychiatric history significant for schizoaffective disorder and ASD by history, who presents at this time with complaints of (and/or evidence of) the following emotional symptoms: suicidal thoughts/threats and auditory hallucinations. Additional symptomatology include poor self care. The above symptoms have been present for 2+ weeks. These symptoms are of moderate to high severity. These symptoms are constant in nature. The patient's condition has been precipitated by psychosocial stressors. UDS: negative; BAL=0. The patient presented to the ED following an episode of agitation in the setting of family stressors, at which time he described worsening AH telling him to die. He cites his grandmother's terminal illness and his mother having to stay with her as a precipitating factors. He states that he was having difficulty coping with his upcoming loss and this has had detrimental effects on his mental state. He has been compliant with medication and not using any substances. The patient has been living at a group home and he hopes to return at the end of this hospitalization. The patient is a fair historian. The patient corroborates the above narrative. The patient contracts for safety on the unit and gives consent for the team to contact collateral. The patient is amenable to initiating treatment while on the unit. Discussed his mood stabilizer Trileptal and how an alternate agent may have higher efficacy, voices understanding. 09/26 - no acute overnight events. Patient slept 7.5 hours overnight.  He refused first doses of Trileptal stating they were [facility])  ASSESSMENT: the patient presents with worsening AH in the setting of external stressors. High emotional component to this presentation; patient is able to voice his concerns in a coherent manner and is apprehensive about coming stressors. Will resume home regimen, provide support and advocate for bereavement counseling in the community. PLAN:  - DISCONTINUE Trileptal 600 mg BID for mood lability  - DISCONTINUE Seroquel 200 mg QHS for psychosis adjunct, insomnia  - START Depakote ER 1500 mg QHS for mood lability  - CONTINUE Latuda 120 mg ACDINNER for psychosis  - IGM therapy as tolerated  - Expand database / obtain collateral  - Dispo planning (home when stable)    Patient is a very slow responder to medications. Risks and benefits in the use of two antipsychotics have been weighed in full, including the risk of metabolic syndrome and the potential increased risk of QTc  Based on this analysis, it is considered favorable for the utilization of two antipsychotics. In the future, once stable on the two antipsychotics, patient can possibly be tapered to one of the chosen antipsychotics. I certify that this patient's inpatient psychiatric hospital services furnished since the previous certification were, and continue to be,required for treatment that could reasonably be expected to improve the patient's condition, or for diagnostic study, and that the patient continues to need, on a daily basis, active treatment furnished directly by or requiring the supervision of inpatient psychiatric facility personnel. In addition, the hospital records show that services furnished were intensive treatment services, admission or related services, or equivalent services.     Signed By: Ira Esteban MD     September 26, 2023

## 2023-09-26 NOTE — BH NOTE
Behavioral Health Treatment Team Note     Patient goal(s) for today: Take medications as prescribed, engage in unit activities, continue taking meds as, participate in hygiene/ADLs, prepare for discharge, follow directions from staff,   Treatment team focus/goals: Patient will continue to maintain a calm and cooperative mood and communicate their needs with staff. Continue taking meds as prescribed, engage in unit activities, participate in hygiene/ADLs, prepare for discharge, follow directions from staff, contact support team, attend groups      Progress note: Patient reported that he would liked to go home. Patient refused trileptal this morning, because he felt like it was making him tired. Patient appears AOx4. Patient is appropriately groomed, dressed in hospital scrubs. Patient is isolative to room. Patient denies SI/HI/AVH. Patient refused Ana Basil, as he felt it was making him sleepy. MD is discontinuing Trileptal and Seroquel, and starting Depakote.  SW will reach out to group home and speak with the,     LOS:  2  Expected LOS: TBD    Insurance info/prescription coverage:  [address] [facility]  Date of last family contact:  9/25  Family requesting physician contact today:  No  Discharge plan:  Group Home  Guns in the home:  No  Outpatient provider(s): REACH    Participating treatment team members: Lucien Fu, ANDREA Regalado, Keiko Hutchinson MD

## 2023-09-26 NOTE — PLAN OF CARE
0800 Patient was met in his room at this time, cooperative and compliant with the assessment. Denied S.I/H.I/A//VH, anxiety and depression at this time. Refused to take Trileptal, MD notified. No PRN medication requested. Patient seemed preoccupied and was discharge focused. No complain of pain. Vital signs within normal parameters. Will continue to monitor. Problem: Psychosis  Goal: Will report no hallucinations or delusions  Description: INTERVENTIONS:  1. Administer medication as  ordered  2. Assist with reality testing to support increasing orientation  3.  Assess if patient's hallucinations or delusions are encouraging self harm or harm to others and intervene as appropriate  Outcome: Progressing

## 2023-09-26 NOTE — BH NOTE
WHITNEY called Darvin Rush, [phone], the patient's . WHITNEY explained why the patient was placed under a TDO. The  reported that the home did not feel comfortable with the patient returning home. His outpatient team is looking at getting the patient a bed at the [facility], with [address] [place] [address], in [place]. They are requesting a meeting with the SW and their treatment team.     The  requested that the hospital team call the [facility] housing, as she thinks there would be a better chance if the hospital called. WHITNEY requested that the  speak to the [address] [place] [address] team, and the hospital will get involved if needed. SW will follow up about treatment team meeting.      ANDREA Albarran

## 2023-09-26 NOTE — GROUP NOTE
Group Therapy Note    Date: 9/26/2023    Group Start Time: 1100  Group End Time: 1200  Group Topic: Topic Group    [address] AC[facility]    Silvia Brandt        Group Therapy Note           Patient's Goal:  To participate in relaxation activity    Notes:  Pt did not attend session    Discipline Responsible: Recreational Therapist      Signature:  Chales Sever

## 2023-09-26 NOTE — GROUP NOTE
Group Therapy Note    Date: 9/26/2023    Group Start Time: 1500  Group End Time: 1600  Group Topic: Recreational    [facility] [address] [facility]    [address]        Group Therapy Note           Patient's Goal:  To concentrate on selected task    Notes:  Pt did not attend session    Discipline Responsible: Recreational Therapist      Signature:  Savannah Murdock

## 2023-09-26 NOTE — BH NOTE
Assumed care of the patient. Patient spent most part of the evening sleeping in his room and  woke up later at night. Patient was cooperative with the assessment. He was seen interacting with selected peers otherwise he mostly kept to himself. Patient denied S.I/H.I/A//VH, anxiety and depression. He was complaint with Buspar but refused Trileptal. No PRN medication requested. Patient seemed preoccupied and was discharge focused. Patient appeared to be sleeping for about 6 hours. Patient refused lab work this morning despite of this RN educating the need for it.

## 2023-09-27 PROCEDURE — 1240000000 HC EMOTIONAL WELLNESS R&B

## 2023-09-27 PROCEDURE — 99232 SBSQ HOSP IP/OBS MODERATE 35: CPT | Performed by: PSYCHIATRY & NEUROLOGY

## 2023-09-27 PROCEDURE — 6370000000 HC RX 637 (ALT 250 FOR IP): Performed by: PSYCHIATRY & NEUROLOGY

## 2023-09-27 RX ADMIN — FLUOXETINE 40 MG: 20 CAPSULE ORAL at 09:03

## 2023-09-27 RX ADMIN — BUSPIRONE HYDROCHLORIDE 15 MG: 10 TABLET ORAL at 09:03

## 2023-09-27 RX ADMIN — DIVALPROEX SODIUM 1500 MG: 500 TABLET, FILM COATED, EXTENDED RELEASE ORAL at 21:37

## 2023-09-27 RX ADMIN — BUSPIRONE HYDROCHLORIDE 15 MG: 10 TABLET ORAL at 21:37

## 2023-09-27 RX ADMIN — LURASIDONE HYDROCHLORIDE 120 MG: 40 TABLET, FILM COATED ORAL at 17:07

## 2023-09-27 RX ADMIN — BUSPIRONE HYDROCHLORIDE 15 MG: 10 TABLET ORAL at 14:08

## 2023-09-27 NOTE — BH NOTE
This writer spoke with Mrs. Polina Stephens again from [facility]. She reports that patient is actually able to return to the home, but wants him to go to a step down program prior to him returning. SW updated patient, he was relieved of information. WHITNEY contacted Oleg Pascal from [facility] to inquire about REACH placement. SW will attempt to get patient into CSU by the end of the week.         Jessica Ta, superivsee in social work

## 2023-09-27 NOTE — PLAN OF CARE
0800 Patient was met in the hallway at this time, cooperative and compliant with the assessment. Denied SI, HI, A/V hallucinations, anxiety and depression, but appears to be anxious as evidenced by pacing in the hallway. Patient seemed preoccupied and was discharge focused. No complain of pain. Vital signs within normal parameters. Compliant with routine medication, but refused to take PRN medication for anxiety. Will continue to monitor. Problem: Anxiety  Goal: Will report anxiety at manageable levels  Description: INTERVENTIONS:  1. Administer medication as ordered  2. Teach and rehearse alternative coping skills  3.  Provide emotional support with 1:1 interaction with staff  Outcome: Progressing

## 2023-09-27 NOTE — BH NOTE
Assumed care of the patient. Patient was somewhat visible on the milieu. He was able to make his needs known. Patient was cooperative with the assessments. He denied S.I/H,I/A/V/H, anxiety and depression. Patient was preoccupied with his discharge. He repeatedly talked about wanting to go back to the place he came from and would like to inform Ms. Kami Driscoll there, that he is ready to go back if she would take him back. Patient was medication and meal compliant. No PRN medication requested or needed. Patient appeared to be sleeping for about 6 hours.

## 2023-09-27 NOTE — BH NOTE
Negative  NEG   Final    PCP, Urine 09/25/2023 Negative  NEG   Final    THC, TH-Cannabinol, Urine 09/25/2023 Negative  NEG   Final    Comments: 09/25/2023 (NOTE)   Final         Assessment & Plan     The patient, Lawrence Gonzalez, is a 29 y.o.  male who presents at this time for treatment of the following diagnoses:  Schizoaffective disorder ([address])  ASSESSMENT: the patient presents with worsening AH in the setting of external stressors. High emotional component to this presentation; patient is able to voice his concerns in a coherent manner and is apprehensive about coming stressors. Will resume home regimen, provide support and advocate for bereavement counseling in the community. PLAN:  - CONTINUE Depakote ER 1500 mg QHS for mood lability  - CONTINUE Latuda 120 mg ACDINNER for psychosis  - IGM therapy as tolerated  - Expand database / obtain collateral  - Dispo planning (home/CSU when stable)    Patient is a very slow responder to medications. Risks and benefits in the use of two antipsychotics have been weighed in full, including the risk of metabolic syndrome and the potential increased risk of QTc  Based on this analysis, it is considered favorable for the utilization of two antipsychotics. In the future, once stable on the two antipsychotics, patient can possibly be tapered to one of the chosen antipsychotics. I certify that this patient's inpatient psychiatric hospital services furnished since the previous certification were, and continue to be,required for treatment that could reasonably be expected to improve the patient's condition, or for diagnostic study, and that the patient continues to need, on a daily basis, active treatment furnished directly by or requiring the supervision of inpatient psychiatric facility personnel. In addition, the hospital records show that services furnished were intensive treatment services, admission or related services, or equivalent services.     Signed By: Dennie Dess

## 2023-09-27 NOTE — BH NOTE
Behavioral Health Treatment Team Note     Patient goal(s) for today: Take medications as prescribed, engage in unit activities Patient Continue taking meds as, participate in hygiene/ADLs, prepare for discharge, follow directions from staff, contact support team, attend groups  Treatment team focus/goals: Patient will continue to maintain a calm and cooperative mood and communicate their needs with staff. Continue taking meds as prescribed, engage in unit activities, participate in hygiene/ADLs, prepare for discharge, follow directions from staff, contact support team, attend groups    Progress note: Pt presents ao x4. Pt presents with linear and organized thought process. Pt was informed he is unable to return to group home. During treatment team pt reacted calmly to information and reports he believes group home is worried he will \"have another blow up\". Pt reports he is particularly emotional because his grandmother is slowly dying. Pt talked to treatment team in hallways and reports he is very irritated he cannot return to group home. SW followed up with group home and they are willing to take him back as long as he attends a CSU first. Sw informed pt who presents relieved and happy.      LOS:  3  Expected LOS: TBD    Insurance info/prescription coverage:  [address]  Date of last family contact:  9/25  Family requesting physician contact today:  No  Discharge plan:  [facility]  in the home:  No  Outpatient provider(s):  REACH    Participating treatment team members: Edil Burkett, supervisee in social work, Leticia Graham MD

## 2023-09-27 NOTE — GROUP NOTE
Group Therapy Note    Date: 9/27/2023    Group Start Time: 1100  Group End Time: 1200  Group Topic: Topic Group    [address] AC[facility]    Silvia Brandt        Group Therapy Note           Patient's Goal:  To participate in relaxation activity    Notes:  Pt did not attend session    Discipline Responsible: Recreational Therapist      Signature:  Seth Zuniga

## 2023-09-28 LAB
SARS-COV-2 RNA RESP QL NAA+PROBE: NOT DETECTED
SOURCE: NORMAL

## 2023-09-28 PROCEDURE — 87635 SARS-COV-2 COVID-19 AMP PRB: CPT

## 2023-09-28 PROCEDURE — 1240000000 HC EMOTIONAL WELLNESS R&B

## 2023-09-28 PROCEDURE — 6370000000 HC RX 637 (ALT 250 FOR IP): Performed by: PSYCHIATRY & NEUROLOGY

## 2023-09-28 PROCEDURE — 99232 SBSQ HOSP IP/OBS MODERATE 35: CPT | Performed by: PSYCHIATRY & NEUROLOGY

## 2023-09-28 RX ORDER — POLYETHYLENE GLYCOL 3350 17 G/17G
17 POWDER, FOR SOLUTION ORAL DAILY PRN
Qty: 527 G | Refills: 1 | Status: SHIPPED | OUTPATIENT
Start: 2023-09-28 | End: 2023-11-29

## 2023-09-28 RX ORDER — FLUOXETINE HYDROCHLORIDE 40 MG/1
40 CAPSULE ORAL DAILY
Qty: 30 CAPSULE | Refills: 1 | Status: SHIPPED | OUTPATIENT
Start: 2023-09-28

## 2023-09-28 RX ORDER — HYDROXYZINE PAMOATE 25 MG/1
50 CAPSULE ORAL 2 TIMES DAILY PRN
Qty: 60 CAPSULE | Refills: 1 | Status: SHIPPED | OUTPATIENT
Start: 2023-09-28 | End: 2023-10-28

## 2023-09-28 RX ORDER — TRAZODONE HYDROCHLORIDE 50 MG/1
50 TABLET ORAL NIGHTLY PRN
Qty: 30 TABLET | Refills: 1 | Status: SHIPPED | OUTPATIENT
Start: 2023-09-28

## 2023-09-28 RX ORDER — MUSCLE RUB CREAM 100; 150 MG/G; MG/G
1 CREAM TOPICAL PRN
Qty: 1 EACH | Refills: 1 | Status: SHIPPED | OUTPATIENT
Start: 2023-09-28

## 2023-09-28 RX ORDER — ACETAMINOPHEN 500 MG
500 TABLET ORAL EVERY 6 HOURS PRN
Qty: 30 TABLET | Refills: 1 | Status: SHIPPED | OUTPATIENT
Start: 2023-09-28

## 2023-09-28 RX ORDER — BUSPIRONE HYDROCHLORIDE 15 MG/1
15 TABLET ORAL 3 TIMES DAILY
Qty: 90 TABLET | Refills: 1 | Status: SHIPPED | OUTPATIENT
Start: 2023-09-28

## 2023-09-28 RX ORDER — DIVALPROEX SODIUM 500 MG/1
1500 TABLET, EXTENDED RELEASE ORAL
Qty: 90 TABLET | Refills: 1 | Status: SHIPPED | OUTPATIENT
Start: 2023-09-28

## 2023-09-28 RX ORDER — HYDROXYZINE 50 MG/1
50 TABLET, FILM COATED ORAL 2 TIMES DAILY PRN
Qty: 60 TABLET | Refills: 1 | Status: SHIPPED | OUTPATIENT
Start: 2023-09-28 | End: 2023-09-28 | Stop reason: HOSPADM

## 2023-09-28 RX ORDER — LURASIDONE HYDROCHLORIDE 120 MG/1
120 TABLET, FILM COATED ORAL
Qty: 30 TABLET | Refills: 1 | Status: SHIPPED | OUTPATIENT
Start: 2023-09-28

## 2023-09-28 RX ADMIN — LURASIDONE HYDROCHLORIDE 120 MG: 40 TABLET, FILM COATED ORAL at 16:48

## 2023-09-28 RX ADMIN — FLUOXETINE 40 MG: 20 CAPSULE ORAL at 08:18

## 2023-09-28 RX ADMIN — BUSPIRONE HYDROCHLORIDE 15 MG: 10 TABLET ORAL at 21:26

## 2023-09-28 RX ADMIN — BUSPIRONE HYDROCHLORIDE 15 MG: 10 TABLET ORAL at 14:00

## 2023-09-28 RX ADMIN — DIVALPROEX SODIUM 1500 MG: 500 TABLET, FILM COATED, EXTENDED RELEASE ORAL at 21:26

## 2023-09-28 RX ADMIN — BUSPIRONE HYDROCHLORIDE 15 MG: 10 TABLET ORAL at 08:18

## 2023-09-28 NOTE — BH NOTE
Date: 9/28     Time: 11-11:35 AM     Type of group: Processing     Topic: Emotions     Participants: 5     Facilitator lead a processing group. The purpose of this group was to learn about and identify emotions. Patients were given an emotions wheel to learn about different emotions discussed them, and practiced expressing emotions by drawing them. Patient did not attend group. Facilitator encouraged patient to come, and patient reported they did not want to come.       ANDREA Navarro

## 2023-09-28 NOTE — BH NOTE
Behavioral Health Transition Record to Provider    Patient Name: Alfonso Garcia  YOB: 1995  Medical Record Number: [id number]  Date of Admission: 9/24/2023  Date of Discharge: 9/28/2023    Attending Provider: Nic Pate MD  Discharging Provider: Tiff Mishra MD  To contact this individual call [phone] and ask the  to page. If unavailable, ask to be transferred to [facility] Provider on call. [facility] Provider will be available on call 24/7 and during holidays. Primary Care Provider: VERONICA Edgar NP    Allergies   Allergen Reactions    Latex Hives    Penicillins Hives, Itching and Shortness Of Breath    Aspirin Hives    Lactose Diarrhea       Reason for Admission: Passive SI with     Admission Diagnosis: Schizoaffective disorder, bipolar type ([address]) [F25.0]  Schizoaffective disorder ([address]) [F25.9]    * No surgery found *    Results for orders placed or performed during the hospital encounter of 09/24/23   COVID-19 & Influenza Combo    Specimen: Nasopharyngeal   Result Value Ref Range    SARS-CoV-2, PCR Not detected NOTD      Rapid Influenza A By PCR Not detected      Rapid Influenza B By PCR Not detected     COVID Only [facility])    Specimen: Nasopharyngeal   Result Value Ref Range    Source Nasopharyngeal      SARS-CoV-2, PCR Not detected NOTD     Ethanol   Result Value Ref Range    Ethanol Lvl <10 <10 MG/DL   CBC with Auto Differential   Result Value Ref Range    WBC 6.4 4.1 - 11.1 K/uL    RBC 5.27 4. 10 - 5.70 M/uL    Hemoglobin 15.0 12.1 - 17.0 g/dL    Hematocrit 44.3 36.6 - 50.3 %    MCV 84.1 80.0 - 99.0 FL    MCH 28.5 26.0 - 34.0 PG    MCHC 33.9 30.0 - 36.5 g/dL    RDW 12.4 11.5 - 14.5 %    Platelets 711 659 - 411 K/uL    MPV 8.8 (L) 8.9 - 12.9 FL    Nucleated RBCs 0.0 0  WBC    nRBC 0.00 0.00 - 0.01 K/uL    Neutrophils % 73 32 - 75 %    Lymphocytes % 21 12 - 49 %    Monocytes % 5 5 - 13 %    Eosinophils % 1 0 - 7 % or electronically.

## 2023-09-28 NOTE — DISCHARGE SUMMARY
- 99.0 FL Final    MCH 09/24/2023 28.5  26.0 - 34.0 PG Final    MCHC 09/24/2023 33.9  30.0 - 36.5 g/dL Final    RDW 09/24/2023 12.4  11.5 - 14.5 % Final    Platelets 45/42/9843 245  150 - 400 K/uL Final    MPV 09/24/2023 8.8 (L)  8.9 - 12.9 FL Final    Nucleated RBCs 09/24/2023 0.0  0  WBC Final    nRBC 09/24/2023 0.00  0.00 - 0.01 K/uL Final    Neutrophils % 09/24/2023 73  32 - 75 % Final    Lymphocytes % 09/24/2023 21  12 - 49 % Final    Monocytes % 09/24/2023 5  5 - 13 % Final    Eosinophils % 09/24/2023 1  0 - 7 % Final    Basophils % 09/24/2023 0  0 - 1 % Final    Immature Granulocytes 09/24/2023 0  0.0 - 0.5 % Final    Neutrophils Absolute 09/24/2023 4.6  1.8 - 8.0 K/UL Final    Lymphocytes Absolute 09/24/2023 1.3  0.8 - 3.5 K/UL Final    Monocytes Absolute 09/24/2023 0.3  0.0 - 1.0 K/UL Final    Eosinophils Absolute 09/24/2023 0.1  0.0 - 0.4 K/UL Final    Basophils Absolute 09/24/2023 0.0  0.0 - 0.1 K/UL Final    Absolute Immature Granulocyte 09/24/2023 0.0  0.00 - 0.04 K/UL Final    Differential Type 09/24/2023 AUTOMATED    Final    Sodium 09/24/2023 138  136 - 145 mmol/L Final    Potassium 09/24/2023 4.1  3.5 - 5.1 mmol/L Final    Chloride 09/24/2023 99  97 - 108 mmol/L Final    CO2 09/24/2023 32  21 - 32 mmol/L Final    Anion Gap 09/24/2023 7  5 - 15 mmol/L Final    Glucose 09/24/2023 104 (H)  65 - 100 mg/dL Final    BUN 09/24/2023 13  6 - 20 MG/DL Final    Creatinine 09/24/2023 1.29  0.70 - 1.30 MG/DL Final    Bun/Cre Ratio 09/24/2023 10 (L)  12 - 20   Final    Est, Glom Filt Rate 09/24/2023 >60  >60 ml/min/1.73m2 Final    Calcium 09/24/2023 9.2  8.5 - 10.1 MG/DL Final    Total Bilirubin 09/24/2023 0.4  0.2 - 1.0 MG/DL Final    ALT 09/24/2023 48  12 - 78 U/L Final    AST 09/24/2023 23  15 - 37 U/L Final    Alk Phosphatase 09/24/2023 125 (H)  45 - 117 U/L Final    Total Protein 09/24/2023 7.9  6.4 - 8.2 g/dL Final    Albumin 09/24/2023 4.3  3.5 - 5.0 g/dL Final    Globulin 09/24/2023 3.6  2.0 - 4.0 known as: PROzac  Take 1 capsule by mouth daily            STOP taking these medications      clonazePAM 0.5 MG tablet  Commonly known as: KLONOPIN     OXcarbazepine 600 MG tablet  Commonly known as: TRILEPTAL     polyethylene glycol 17 GM/SCOOP powder  Commonly known as: GLYCOLAX  Replaced by: polyethylene glycol 17 g packet     QUEtiapine 200 MG tablet  Commonly known as: SEROQUEL     sertraline 100 MG tablet  Commonly known as: ZOLOFT     sodium chloride 0.65 % nasal spray  Commonly known as: OCEAN               Where to Get Your Medications        These medications were sent to [facility], [address] [facility] [phone] [facility] [address] [place] [address]      Phone: [phone]   acetaminophen 500 MG tablet  busPIRone 15 MG tablet  divalproex 500 MG extended release tablet  FLUoxetine 40 MG capsule  hydrOXYzine pamoate 25 MG capsule  lurasidone 120 MG tablet  muscle rub 10-15 % Crea cream  polyethylene glycol 17 g packet  traZODone 50 MG tablet                A coordinated, multidisplinary treatment team round was conducted with [facility] Records is done daily here at [facility]. This team consists of the nurse, psychiatric unit pharmacist,  and Abraham Fulton. I have spent greater than 35 minutes on discharge work.     Signed:  Teofilo Jensen MD  9/28/2023

## 2023-09-28 NOTE — PROGRESS NOTES
Pt screened per LOS policy. No acute nutrition or weight issues identified. Pt meal compliant. Hx unremarkable per nutrition. Double portions ordered to help meet ~ energy/protein needs.   Ht: 6'3\"  Wt: 215 lb  BMI: 26.87 kg/(m^2 c/w overweight  Est energy needs: 2435 kcal, 75 g protein, 2500 mL fluids  Pt will consume > 75% of meals at follow up 7-10 days  LOS

## 2023-09-28 NOTE — PLAN OF CARE
Problem: Anxiety  Goal: Will report anxiety at manageable levels  Description: INTERVENTIONS:  1. Administer medication as ordered  2. Teach and rehearse alternative coping skills  3. Provide emotional support with 1:1 interaction with staff  Recent Flowsheet Documentation  Taken 9/27/2023 2027 by Domenica Cohen RN  Will report anxiety at manageable levels: Teach and rehearse alternative coping skills     Problem: Psychosis  Goal: Will report no hallucinations or delusions  Description: INTERVENTIONS:  1. Administer medication as  ordered  2. Assist with reality testing to support increasing orientation  3.  Assess if patient's hallucinations or delusions are encouraging self harm or harm to others and intervene as appropriate  Outcome: Progressing

## 2023-09-28 NOTE — GROUP NOTE
Group Therapy Note    Date: 9/28/2023    Group Start Time: 1400  Group End Time: 1500  Group Topic: Recreational    [address] AC[facility]    [address]        Group Therapy Note           Patient's Goal:  To concentrate on selected task    Notes:  Pt did not attend session    Discipline Responsible: Recreational Therapist      Signature:  Savannah Murdock

## 2023-09-28 NOTE — BH NOTE
Nurses Note;      1900 to 0700:      Report received from outgoing day shift RN. Assumed care of patient. On initial round Patient is in room, awake, alert, oriented, resting in bed, calm and cooperative. Patient  denies S/I, H/I, A/V/H, pain, depression, anxiety. Patient affect flat, Patient is isolative and Low risk C-SSRS. Vital signs stable. Patient is  compliant  with HS medications. Patient received PRN for. Patient observed resting in bed during shift rounds. Continuously hourly rounds and q 15 minute checks maintained during shift for care and safety. Patient slept for 6.30 hrs.

## 2023-09-28 NOTE — BH NOTE
At this time discharge instructions provided to patient, denied any question and verbalized understanding. Denied SI, HI, A/V hallucinations, anxiety and depression. No complain of pain. Patient left in stable condition.

## 2023-09-28 NOTE — PLAN OF CARE
0830 Patient was met in the hallway at this time, cooperative and compliant with the assessment. Denied SI, HI, A/V hallucinations, anxiety and depression. No complain of pain. Vital signs within normal parameters. Compliant with routine medication, but refused to take PRN medication for anxiety. Will continue to monitor. Problem: Discharge Planning  Goal: Discharge to home or other facility with appropriate resources  Outcome: Progressing     Problem: Psychosis  Goal: Will report no hallucinations or delusions  Description: INTERVENTIONS:  1. Administer medication as  ordered  2. Assist with reality testing to support increasing orientation  3.  Assess if patient's hallucinations or delusions are encouraging self harm or harm to others and intervene as appropriate  9/28/2023 0428 by Sonido Lagunas RN  Outcome: Progressing

## 2023-09-28 NOTE — BH NOTE
Behavioral Health Treatment Team Note     Patient goal(s) for today: Take medications as prescribed, engage in unit activities Patient Continue taking meds as, participate in hygiene/ADLs, prepare for discharge, follow directions from staff, contact support team, attend groups  Treatment team focus/goals: Patient will continue to maintain a calm and cooperative mood and communicate their needs with staff. Continue taking meds as prescribed, engage in unit activities, participate in hygiene/ADLs, prepare for discharge, follow directions from staff, contact support team, attend groups    Progress note: Pt presents aox4. Pt presents with linear thought process. Pt reports he is excited for discharge. Pt presents with euthymic mood Pt met with Corrina from [facility] for an intake assessment.  Pt will be discharged tomorrow to [facility].     LOS:  4  Expected LOS: Tomorrow    Insurance info/prescription coverage:  [facility]   Date of last family contact:  9/25  Family requesting physician contact today:  No  Discharge plan:  [address] [place] [address]  in the home:  No  Outpatient provider(s):  [facility]    Participating treatment team members: Tanisha Jackson, supervisee in social work, Anny Rosenbaum MD

## 2023-09-28 NOTE — GROUP NOTE
Group Therapy Note    Date: 9/28/2023    Group Start Time: 1000  Group End Time: 1100  Group Topic: Topic Group    [facility] ACUTE BEHAV TH    Silvia Brandt        Group Therapy Note         Patient's Goal:  To participate in relaxation activity    Notes:  Pt did not attend session    Discipline Responsible: Recreational Therapist      Signature:  Tania Gresham

## 2023-09-29 VITALS
DIASTOLIC BLOOD PRESSURE: 71 MMHG | OXYGEN SATURATION: 97 % | BODY MASS INDEX: 26.73 KG/M2 | RESPIRATION RATE: 17 BRPM | TEMPERATURE: 98.2 F | HEIGHT: 75 IN | WEIGHT: 215 LBS | SYSTOLIC BLOOD PRESSURE: 136 MMHG | HEART RATE: 77 BPM

## 2023-09-29 LAB — VALPROATE SERPL-MCNC: 72 UG/ML (ref 50–100)

## 2023-09-29 PROCEDURE — 6370000000 HC RX 637 (ALT 250 FOR IP): Performed by: PSYCHIATRY & NEUROLOGY

## 2023-09-29 PROCEDURE — 80164 ASSAY DIPROPYLACETIC ACD TOT: CPT

## 2023-09-29 PROCEDURE — 36415 COLL VENOUS BLD VENIPUNCTURE: CPT

## 2023-09-29 PROCEDURE — 99239 HOSP IP/OBS DSCHRG MGMT >30: CPT | Performed by: PSYCHIATRY & NEUROLOGY

## 2023-09-29 RX ADMIN — FLUOXETINE 40 MG: 20 CAPSULE ORAL at 09:11

## 2023-09-29 RX ADMIN — BUSPIRONE HYDROCHLORIDE 15 MG: 10 TABLET ORAL at 09:11

## 2023-09-29 NOTE — PROGRESS NOTES
Laboratory Monitoring for Valproic Acid    This patient is currently prescribed the following medication(s):   Current Facility-Administered Medications: divalproex (DEPAKOTE ER) extended release tablet 1,500 mg, 1,500 mg, Oral, QHS  acetaminophen (TYLENOL) tablet 650 mg, 650 mg, Oral, Q4H PRN  polyethylene glycol (GLYCOLAX) packet 17 g, 17 g, Oral, Daily PRN  aluminum & magnesium hydroxide-simethicone (MAALOX) 200-200-20 MG/5ML suspension 30 mL, 30 mL, Oral, Q6H PRN  hydrOXYzine HCl (ATARAX) tablet 50 mg, 50 mg, Oral, TID PRN  haloperidol (HALDOL) tablet 5 mg, 5 mg, Oral, Q4H PRN **OR** haloperidol lactate (HALDOL) injection 5 mg, 5 mg, IntraMUSCular, Q4H PRN  diphenhydrAMINE (BENADRYL) injection 50 mg, 50 mg, IntraMUSCular, Q4H PRN  traZODone (DESYREL) tablet 50 mg, 50 mg, Oral, Nightly PRN  busPIRone (BUSPAR) tablet 15 mg, 15 mg, Oral, TID  FLUoxetine (PROZAC) capsule 40 mg, 40 mg, Oral, Daily  lurasidone (LATUDA) tablet 120 mg, 120 mg, Oral, Dinner    The following labs have been completed for monitoring of valproic acid:    Valproic Acid Serum Concentration  Lab Results   Component Value Date/Time    VALAC 72 09/29/2023 05:39 AM         Hepatic Function  Lab Results   Component Value Date/Time    BILITOT 0.4 09/24/2023 07:12 PM    PROT 7.9 09/24/2023 07:12 PM    LABALBU 4.3 09/24/2023 07:12 PM    GLOB 3.6 09/24/2023 07:12 PM    ALBUMIN 1.2 09/24/2023 07:12 PM    ALT 48 09/24/2023 07:12 PM    AST 23 09/24/2023 07:12 PM    ALKPHOS 125 09/24/2023 07:12 PM    ALKPHOS 102 01/02/2022 10:38 PM       Hematology  Lab Results   Component Value Date/Time    WBC 6.4 09/24/2023 07:12 PM    RBC 5.27 09/24/2023 07:12 PM    HGB 15.0 09/24/2023 07:12 PM    HCT 44.3 09/24/2023 07:12 PM    MCV 84.1 09/24/2023 07:12 PM    MCH 28.5 09/24/2023 07:12 PM    MCHC 33.9 09/24/2023 07:12 PM    RDW 12.4 09/24/2023 07:12 PM     09/24/2023 07:12 PM       Assessment/Plan:  Valproic acid level obtained on 9/29 is within therapeutic

## 2023-09-29 NOTE — DISCHARGE INSTRUCTIONS
DISCHARGE SUMMARY    Isabel Lambert  : 1995  MRN: [id number]    The patient Lawrence Gonzalez exhibits the ability to control behavior in a less restrictive environment. Patient's level of functioning is improving. No assaultive/destructive behavior has been observed for the past 24 hours. No suicidal/homicidal threat or behavior has been observed for the past 24 hours. There is no evidence of serious medication side effects. Patient has not been in physical or protective restraints for at least the past 24 hours. If weapons involved, how are they secured? No access    Is patient aware of and in agreement with discharge plan? Yes    Arrangements for medication:  Prescriptions sent to pharmacy     Copy of discharge instructions to provider?:  Yes    Arrangements for transportation home:  group home    Keep all follow up appointments as scheduled, continue to take prescribed medications per physician instructions.   Mental health crisis number:  358 or your local mental health crisis line number at [phone] [address] [place] [address] Emergency WARM LINE      [phone] [phone])      M-F: 9am to 9pm      Sat & Sun: [address] [place] [address] suicide prevention lines:                             [phone]-QSUOPVD ([phone])       [phone] ([phone])    Crisis Text Line:  Text HOME to 022634

## 2023-09-29 NOTE — DISCHARGE SUMMARY
PSYCHIATRIC DISCHARGE SUMMARY         IDENTIFICATION:    Patient Name  Lucien Fu   Date of Birth 1995   [facility] [id number]   Medical Record Number  [id number]      Age  29 y.o. PCP VERONICA Malagon NP   Admit date:  9/24/2023    Discharge date: 9/29/2023   Room Number  316/01  @ [facility]   Date of Service  9/29/2023            TYPE OF DISCHARGE: REGULAR               CONDITION AT DISCHARGE: Improved and Fair       PROVISIONAL & DISCHARGE DIAGNOSES:        Active Hospital Problems    *Schizoaffective disorder ([address])        DISCHARGE DIAGNOSIS:   Axis I:  SEE ABOVE  Axis II: SEE ABOVE  Axis III: SEE ABOVE  Axis IV:  lack of structure  Axis V:  20 on admission, 60 on discharge     HISTORY OF PRESENT ILLNESS:    \"Suicidal ideation\"    The patient, Lucien Fu, is a 29 y.o. More than one Race male with a past psychiatric history significant for schizoaffective disorder and ASD by history, who presents at this time with complaints of (and/or evidence of) the following emotional symptoms: suicidal thoughts/threats and auditory hallucinations. Additional symptomatology include poor self care. The above symptoms have been present for 2+ weeks. These symptoms are of moderate to high severity. These symptoms are constant in nature. The patient's condition has been precipitated by psychosocial stressors. UDS: negative; BAL=0. The patient presented to the ED following an episode of agitation in the setting of family stressors, at which time he described worsening AH telling him to die. He cites his grandmother's terminal illness and his mother having to stay with her as a precipitating factors. He states that he was having difficulty coping with his upcoming loss and this has had detrimental effects on his mental state. He has been compliant with medication and not using any substances.  The patient has been living at a group home and he hopes to return at the end of this to take this     muscle rub 10-15 % Crea cream  Apply 1 each topically as needed (shoulder pain)  What changed: how much to take            CONTINUE taking these medications      acetaminophen 500 MG tablet  Commonly known as: TYLENOL  Take 1 tablet by mouth every 6 hours as needed for Pain     busPIRone 15 MG tablet  Commonly known as: BUSPAR  Take 15 mg by mouth 3 times daily     FLUoxetine 40 MG capsule  Commonly known as: PROzac  Take 1 capsule by mouth daily            STOP taking these medications      clonazePAM 0.5 MG tablet  Commonly known as: KLONOPIN     OXcarbazepine 600 MG tablet  Commonly known as: TRILEPTAL     polyethylene glycol 17 GM/SCOOP powder  Commonly known as: GLYCOLAX  Replaced by: polyethylene glycol 17 g packet     QUEtiapine 200 MG tablet  Commonly known as: SEROQUEL     sertraline 100 MG tablet  Commonly known as: ZOLOFT     sodium chloride 0.65 % nasal spray  Commonly known as: OCEAN               Where to Get Your Medications        These medications were sent to [address] [place] [address] [phone] Nick Suresh [phone]  [address] [place] [address]      Phone: [phone]   acetaminophen 500 MG tablet  busPIRone 15 MG tablet  divalproex 500 MG extended release tablet  FLUoxetine 40 MG capsule  hydrOXYzine pamoate 25 MG capsule  lurasidone 120 MG tablet  muscle rub 10-15 % Crea cream  polyethylene glycol 17 g packet  traZODone 50 MG tablet                A coordinated, multidisplinary treatment team round was conducted with Mylene Trinh is done daily here at [facility]. This team consists of the nurse, psychiatric unit pharmacist,  and Doris Jaramillo. I have spent greater than 35 minutes on discharge work.     Signed:  Anya Vance MD  9/29/2023

## 2023-09-29 NOTE — GROUP NOTE
Group Therapy Note    Date: 9/29/2023    Group Start Time: 0915  Group End Time: 0930  Group Topic: Community Meeting    [address] [place] [address]; Radha Limon        Group Therapy Note    Attendees: 5         Notes:  did not attend      Signature:  [employer]

## 2023-09-29 NOTE — BH NOTE
E/M Psychiatric Progress Note    Patient: Felix Malone MRN: [id number]  SSN: [id number]    YOB: 1995  Age: 29 y.o. Sex: male      Admit Date: 9/24/2023    LOS: 4 days     Chief Complaint: psychosis / SI    Subjective:     HPI / INTERVAL HISTORY:    The patient, Felix Malone, is a 29 y.o. More than one Race male with a past psychiatric history significant for schizoaffective disorder and ASD by history, who presents at this time with complaints of (and/or evidence of) the following emotional symptoms: suicidal thoughts/threats and auditory hallucinations. Additional symptomatology include poor self care. The above symptoms have been present for 2+ weeks. These symptoms are of moderate to high severity. These symptoms are constant in nature. The patient's condition has been precipitated by psychosocial stressors. UDS: negative; BAL=0. The patient presented to the ED following an episode of agitation in the setting of family stressors, at which time he described worsening AH telling him to die. He cites his grandmother's terminal illness and his mother having to stay with her as a precipitating factors. He states that he was having difficulty coping with his upcoming loss and this has had detrimental effects on his mental state. He has been compliant with medication and not using any substances. The patient has been living at a group home and he hopes to return at the end of this hospitalization. The patient is a fair historian. The patient corroborates the above narrative. The patient contracts for safety on the unit and gives consent for the team to contact collateral. The patient is amenable to initiating treatment while on the unit. Discussed his mood stabilizer Trileptal and how an alternate agent may have higher efficacy, voices understanding. 09/26 - no acute overnight events. Patient slept 7.5 hours overnight.  He refused first doses of Trileptal stating they were

## 2023-09-29 NOTE — BH NOTE
Patient alert and verbal. Discharged to CSU to continue recommended plan of care. Discharge instructions reviewed with the patient. Pawel verbalized understanding. Patient belongings and home medications returned. Patient was escorted to the ED exit where he was met by the counselor to transport him to his destination.

## 2023-09-29 NOTE — PLAN OF CARE
Problem: Anxiety  Goal: Will report anxiety at manageable levels  Description: INTERVENTIONS:  1. Administer medication as ordered  2. Teach and rehearse alternative coping skills  3.  Provide emotional support with 1:1 interaction with staff  9/29/2023 0837 by Fannie Guevara RN  Outcome: Progressing

## 2023-09-29 NOTE — PROGRESS NOTES
Pt was received pacing in the hallway engaging with select peers. Pt's affect is flat and his mood is \"ok. \" Pt denies SI/HI and AVH. Pt was moved to room 316 and had no issues with the move. Pt was calm and cooperative with care, assessments and medication. Q15 rounds in place, plan of care to continue. 0556-Labs drawn. 0700-Pt slept 6.5 hours.

## 2023-09-29 NOTE — GROUP NOTE
Group Therapy Note    Date: 2023    Group Start Time: 915  Group End Time: 930  Group Topic: [employer] Meeting    [facility] [address] [place] [address]; Gabriel Dejesus        Group Therapy Note    Attendees: 5           Patient's Goal:  ***    Notes:  ***    Status After Intervention:  {Status After Intervention:565974400}    Participation Level: {Participation Level:199340353}    Participation Quality: {[facility] PARTICIPATION QUALITY:471803579}      Speech:  {[facility] CD_SPEECH:52438}      Thought Process/Content: {Thought Process/Content:204648130}      Affective Functioning: {Affective Functionin}      Mood: {Mood:600122875}      Level of consciousness:  {Level of consciousness:098636194}      Response to [facility] [place] [address] Responses to [employer]}      Endings: {[facility] Endings:20871}    Modes of Intervention: {[facility] BHI Modes of Intervention:089891324}      Discipline Responsible: [address] [place] [address] Multidisciplinary:[id number]}      Signature:  [employer]

## 2023-09-29 NOTE — PLAN OF CARE
Problem: Discharge Planning  Goal: Discharge to home or other facility with appropriate resources  9/28/2023 2045 by Angelia Glover RN  Outcome: Progressing  9/28/2023 1057 by Sylvie Eisenmenger, RN  Outcome: Progressing     Problem: Depression  Goal: Will be euthymic at discharge  Description: INTERVENTIONS:  1. Administer medication as ordered  2. Provide emotional support via 1:1 interaction with staff  3. Encourage involvement in milieu/groups/activities  4. Monitor for social isolation  Outcome: Progressing     Problem: Psychosis  Goal: Will report no hallucinations or delusions  Description: INTERVENTIONS:  1. Administer medication as  ordered  2. Assist with reality testing to support increasing orientation  3. Assess if patient's hallucinations or delusions are encouraging self harm or harm to others and intervene as appropriate  Outcome: Progressing     Problem: Anxiety  Goal: Will report anxiety at manageable levels  Description: INTERVENTIONS:  1. Administer medication as ordered  2. Teach and rehearse alternative coping skills  3.  Provide emotional support with 1:1 interaction with staff  Outcome: Progressing

## 2023-09-29 NOTE — PLAN OF CARE
Problem: Discharge Planning  Goal: Discharge to home or other facility with appropriate resources  9/29/2023 9178 by Tito Mark RN  Outcome: Adequate for Discharge  9/28/2023 2045 by Renae Segura RN  Outcome: Progressing  Flowsheets (Taken 9/28/2023 2045)  Discharge to home or other facility with appropriate resources: Identify barriers to discharge with patient and caregiver     Problem: Depression  Goal: Will be euthymic at discharge  Description: INTERVENTIONS:  1. Administer medication as ordered  2. Provide emotional support via 1:1 interaction with staff  3. Encourage involvement in milieu/groups/activities  4. Monitor for social isolation  9/29/2023 7536 by Tito Mark RN  Outcome: Adequate for Discharge  9/28/2023 2045 by Renae Segura RN  Outcome: Progressing     Problem: Psychosis  Goal: Will report no hallucinations or delusions  Description: INTERVENTIONS:  1. Administer medication as  ordered  2. Assist with reality testing to support increasing orientation  3. Assess if patient's hallucinations or delusions are encouraging self harm or harm to others and intervene as appropriate  9/29/2023 0838 by Tito Mark RN  Outcome: Adequate for Discharge  9/28/2023 2045 by Renae Segura RN  Outcome: Progressing     Problem: Anxiety  Goal: Will report anxiety at manageable levels  Description: INTERVENTIONS:  1. Administer medication as ordered  2. Teach and rehearse alternative coping skills  3.  Provide emotional support with 1:1 interaction with staff  9/29/2023 6378 by Tito Mark RN  Outcome: Adequate for Discharge  9/29/2023 3881 by Tito Mark RN  Outcome: Progressing  9/28/2023 2045 by Renae Segura RN  Outcome: Progressing  Flowsheets (Taken 9/28/2023 2045)  Will report anxiety at manageable levels: Administer medication as ordered

## 2023-09-29 NOTE — PROGRESS NOTES
Writer met with patient in his room. Patient was sitting in bed resting quietly and appeared calm/free from distress. Patient was cooperative with assessment. Patient presents with a flat affect and pleasant mood. Patient denies anxiety, depression, SI, HI, and AVH. Patient expressed his enthusiasm about upcoming discharge. Patient is medication and meal compliant. Q15 minute safety checks maintained.

## 2023-11-07 ENCOUNTER — OFFICE VISIT (OUTPATIENT)
Dept: PRIMARY CARE CLINIC | Facility: CLINIC | Age: 28
End: 2023-11-07
Payer: COMMERCIAL

## 2023-11-07 VITALS
SYSTOLIC BLOOD PRESSURE: 113 MMHG | WEIGHT: 208.2 LBS | TEMPERATURE: 97.8 F | BODY MASS INDEX: 25.89 KG/M2 | RESPIRATION RATE: 16 BRPM | OXYGEN SATURATION: 97 % | HEART RATE: 71 BPM | DIASTOLIC BLOOD PRESSURE: 65 MMHG | HEIGHT: 75 IN

## 2023-11-07 DIAGNOSIS — F41.9 ANXIETY AND DEPRESSION: ICD-10-CM

## 2023-11-07 DIAGNOSIS — F32.A ANXIETY AND DEPRESSION: ICD-10-CM

## 2023-11-07 DIAGNOSIS — F25.0 SCHIZOAFFECTIVE DISORDER, BIPOLAR TYPE (HCC): Primary | ICD-10-CM

## 2023-11-07 DIAGNOSIS — M62.838 MUSCLE SPASM: ICD-10-CM

## 2023-11-07 DIAGNOSIS — F84.5 ASPERGER SYNDROME: ICD-10-CM

## 2023-11-07 DIAGNOSIS — F84.0 AUTISM DISORDER: ICD-10-CM

## 2023-11-07 PROCEDURE — 99204 OFFICE O/P NEW MOD 45 MIN: CPT | Performed by: INTERNAL MEDICINE

## 2023-11-07 RX ORDER — CYCLOBENZAPRINE HCL 10 MG
10 TABLET ORAL AS NEEDED
Qty: 20 TABLET | Refills: 0 | Status: SHIPPED | OUTPATIENT
Start: 2023-11-07

## 2023-11-07 SDOH — ECONOMIC STABILITY: FOOD INSECURITY: WITHIN THE PAST 12 MONTHS, THE FOOD YOU BOUGHT JUST DIDN'T LAST AND YOU DIDN'T HAVE MONEY TO GET MORE.: PATIENT DECLINED

## 2023-11-07 SDOH — ECONOMIC STABILITY: FOOD INSECURITY: WITHIN THE PAST 12 MONTHS, YOU WORRIED THAT YOUR FOOD WOULD RUN OUT BEFORE YOU GOT MONEY TO BUY MORE.: PATIENT DECLINED

## 2023-11-07 SDOH — ECONOMIC STABILITY: HOUSING INSECURITY
IN THE LAST 12 MONTHS, WAS THERE A TIME WHEN YOU DID NOT HAVE A STEADY PLACE TO SLEEP OR SLEPT IN A SHELTER (INCLUDING NOW)?: PATIENT REFUSED

## 2023-11-07 SDOH — ECONOMIC STABILITY: INCOME INSECURITY: HOW HARD IS IT FOR YOU TO PAY FOR THE VERY BASICS LIKE FOOD, HOUSING, MEDICAL CARE, AND HEATING?: PATIENT DECLINED

## 2023-11-07 ASSESSMENT — PATIENT HEALTH QUESTIONNAIRE - PHQ9
SUM OF ALL RESPONSES TO PHQ QUESTIONS 1-9: 0
SUM OF ALL RESPONSES TO PHQ9 QUESTIONS 1 & 2: 0
2. FEELING DOWN, DEPRESSED OR HOPELESS: 0
SUM OF ALL RESPONSES TO PHQ QUESTIONS 1-9: 0
1. LITTLE INTEREST OR PLEASURE IN DOING THINGS: 0

## 2023-11-07 ASSESSMENT — ENCOUNTER SYMPTOMS
COUGH: 0
EYE DISCHARGE: 0
CHEST TIGHTNESS: 0
SHORTNESS OF BREATH: 0
BACK PAIN: 0
DIARRHEA: 0
RHINORRHEA: 0
ABDOMINAL PAIN: 0
COLOR CHANGE: 0
SORE THROAT: 0
CONSTIPATION: 0

## 2024-01-05 NOTE — DISCHARGE SUMMARY
PSYCHIATRIC DISCHARGE SUMMARY         IDENTIFICATION:    Patient Name  Jason Granger   Date of Birth 1995   [facility] [id number]   Medical Record Number  [id number]      Age  22 y.o. PCP Ashleigh Vick MD   Admit date:  1/17/2021    Discharge date: 1/26/2021   Room Number  310/01  @ [address]   Date of Service  1/26/2021            TYPE OF DISCHARGE: REGULAR               CONDITION AT DISCHARGE: improved       PROVISIONAL & DISCHARGE DIAGNOSES:    Problem List  Date Reviewed: 1/14/2021          Codes Class    * (Principal) Schizophrenia ([facility] ICD-10-CM: F20.9  ICD-9-CM: 295.90         Autism ICD-10-CM: F84.0  ICD-9-CM: 299.00         Undifferentiated schizophrenia ([facility] ICD-10-CM: F20.3  ICD-9-CM: 295.90         Jaw pain ICD-10-CM: R68.84  ICD-9-CM: 784.92         Right groin pain ICD-10-CM: R10.31  ICD-9-CM: 789.03               Active Hospital Problems    *Schizophrenia ([facility]      Autism        DISCHARGE DIAGNOSIS:   Axis I:  SEE ABOVE  Axis II: SEE ABOVE  Axis III: SEE ABOVE  Axis IV:  lack of structure  Axis V:  <50 on admission, 55+ on discharge     CC & HISTORY OF PRESENT ILLNESS:  71-year-old male with Asperger syndrome, schizophrenia or schizoaffective disorder, who comes to the hospital stating he is hearing voices telling him to shoot himself. He says he did not want to do that, and he is afraid of the voices. He came in to get help. He says he is taking medications and maybe just adjustment might help put things back in order. He is in the process of getting to a new group home with his outpatient providers, and being homeless might have sparked the stressor that exacerbated his condition, and he is here for management of his condition.      SOCIAL HISTORY:    Social History     Socioeconomic History    Marital status: SINGLE     Spouse name: Not on file    Number of children: Not on file    Years of education: Not on file    Highest education level: Not on file Occupational History    Not on file   Social Needs    Financial resource strain: Not on file    Food insecurity     Worry: Not on file     Inability: Not on file    Transportation needs     Medical: Not on file     Non-medical: Not on file   Tobacco Use    Smoking status: Never Smoker    Smokeless tobacco: Never Used   Substance and Sexual Activity    Alcohol use: Never     Frequency: Never    Drug use: Never    Sexual activity: Never   Lifestyle    Physical activity     Days per week: Not on file     Minutes per session: Not on file    Stress: Not on file   Relationships    Social connections     Talks on phone: Not on file     Gets together: Not on file     Attends Nondenominational service: Not on file     Active member of club or organization: Not on file     Attends meetings of clubs or organizations: Not on file     Relationship status: Not on file    Intimate partner violence     Fear of current or ex partner: Not on file     Emotionally abused: Not on file     Physically abused: Not on file     Forced sexual activity: Not on file   Other Topics Concern    Not on file   Social History Narrative    Lives with mom    Goes to day program      FAMILY HISTORY:   Family History   Problem Relation Age of Onset    No Known Problems Mother     No Known Problems Father              HOSPITALIZATION COURSE:    Mary Monique was admitted to the inpatient psychiatric unit [facility] for acute psychiatric stabilization in regards to symptomatology as described in the HPI above. The differential diagnosis at time of admission included: schizophrenia vs substance induced psychotic disorder schizoaffective vs bipolar vs adjustment disorder. While on the unit Mary Monique was involved in individual, group, occupational and milieu therapy. Psychiatric medications were adjusted during this hospitalization. Mary Monique demonstrated a progressive improvement in overall condition.   Much of patient's initial presentation appeared to be related to situational stressors, effects of medication non-compliance and psychological factors. Please see individual progress notes for more specific details regarding patient's hospitalization course. Brittany Carrasco reports feeling well and moods are good. Denies SI/HI/AH/VH. No aggression or violence. Appropriately interactive and aware. Limited eye contact. Tolerating medications well. Eating and sleeping fairly. Patient with request for discharge today. There are no grounds to seek a TDO. At time of discharge, Brittany Carrasco is without significant problems of depression, psychosis, or violeta. Patient free of suicidal and homicidal ideations (appears to be at very low risk of suicide or homicide) and reports many positive predictive factors in terms of not attempting suicide or homicide. Overall presentation at time of discharge is most consistent with the diagnosis of Autism Spectrum disorder, Schizoaffective disorder Bipolar Type. Patient has maximized benefit to be derived from acute inpatient psychiatric treatment. All members of the treatment team concur with each other in regards to plans for discharge today. Patient and family are aware and in agreement with discharge and discharge plan. LABS AND IMAGAING:    Labs Reviewed   CBC WITH AUTOMATED DIFF - Abnormal; Notable for the following components:       Result Value    NEUTROPHILS 79 (*)     MONOCYTES 4 (*)     IMMATURE GRANULOCYTES 1 (*)     ABS.  LYMPHOCYTES 0.7 (*)     All other components within normal limits   METABOLIC PANEL, COMPREHENSIVE - Abnormal; Notable for the following components:    Creatinine 1.52 (*)     BUN/Creatinine ratio 9 (*)     GFR est non-AA 56 (*)     All other components within normal limits   SARS-COV-2, PCR   ETHYL ALCOHOL   DRUG SCREEN, URINE   URINALYSIS W/ REFLEX CULTURE   SARS-COV-2   TSH 3RD GENERATION   LIPID PANEL   GLUCOSE, FASTING SARS-COV-2   SARS-COV-2     No results found for: DS35, PHEN, PHENO, PHENT, DILF, DS39, PHENY, PTN, VALF2, VALAC, VALP, VALPR, DS6, CRBAM, CRBAMP, CARB2, XCRBAM  Admission on 01/17/2021   Component Date Value Ref Range Status    ALCOHOL(ETHYL),SERUM 01/17/2021 <10  <10 MG/DL Final    AMPHETAMINES 01/17/2021 Negative  NEG   Final    BARBITURATES 01/17/2021 Negative  NEG   Final    BENZODIAZEPINES 01/17/2021 Negative  NEG   Final    COCAINE 01/17/2021 Negative  NEG   Final    METHADONE 01/17/2021 Negative  NEG   Final    OPIATES 01/17/2021 Negative  NEG   Final    PCP(PHENCYCLIDINE) 01/17/2021 Negative  NEG   Final    THC (TH-CANNABINOL) 01/17/2021 Negative  NEG   Final    Drug screen comment 01/17/2021 (NOTE)   Final    Color 01/17/2021 YELLOW/STRAW    Final    Appearance 01/17/2021 CLEAR  CLEAR   Final    Specific gravity 01/17/2021 1.010  1.003 - 1.030   Final    pH (UA) 01/17/2021 7.0  5.0 - 8.0   Final    Protein 01/17/2021 Negative  NEG mg/dL Final    Glucose 01/17/2021 Negative  NEG mg/dL Final    Ketone 01/17/2021 Negative  NEG mg/dL Final    Bilirubin 01/17/2021 Negative  NEG   Final    Blood 01/17/2021 Negative  NEG   Final    Urobilinogen 01/17/2021 1.0  0.2 - 1.0 EU/dL Final    Nitrites 01/17/2021 Negative  NEG   Final    Leukocyte Esterase 01/17/2021 Negative  NEG   Final    WBC 01/17/2021 0-4  0 - 4 /hpf Final    RBC 01/17/2021 0-5  0 - 5 /hpf Final    Epithelial cells 01/17/2021 FEW  FEW /lpf Final    Bacteria 01/17/2021 Negative  NEG /hpf Final    UA:UC IF INDICATED 01/17/2021 CULTURE NOT INDICATED BY UA RESULT  CNI   Final    Specimen source 01/17/2021 Nasopharyngeal    Final    Specimen source 01/17/2021 Nasopharyngeal    Final    COVID-19 rapid test 01/17/2021 Not detected  NOTD   Final    Specimen type 01/17/2021 NP Swab    Final    Health status 01/17/2021 Symptomatic Testing    Final    Specimen source 01/17/2021 Nasopharyngeal    Final    SARS-CoV-2 01/17/2021 Not detected  NOTD   Final    WBC 01/17/2021 4.2  4.1 - 11.1 K/uL Final    RBC 01/17/2021 4.90  4. 10 - 5.70 M/uL Final    HGB 01/17/2021 13.4  12.1 - 17.0 g/dL Final    HCT 01/17/2021 41.4  36.6 - 50.3 % Final    MCV 01/17/2021 84.5  80.0 - 99.0 FL Final    MCH 01/17/2021 27.3  26.0 - 34.0 PG Final    MCHC 01/17/2021 32.4  30.0 - 36.5 g/dL Final    RDW 01/17/2021 13.5  11.5 - 14.5 % Final    PLATELET 21/94/5333 943  150 - 400 K/uL Final    MPV 01/17/2021 9.7  8.9 - 12.9 FL Final    NRBC 01/17/2021 0.0  0  WBC Final    ABSOLUTE NRBC 01/17/2021 0.00  0.00 - 0.01 K/uL Final    NEUTROPHILS 01/17/2021 79* 32 - 75 % Final    LYMPHOCYTES 01/17/2021 16  12 - 49 % Final    MONOCYTES 01/17/2021 4* 5 - 13 % Final    EOSINOPHILS 01/17/2021 0  0 - 7 % Final    BASOPHILS 01/17/2021 0  0 - 1 % Final    IMMATURE GRANULOCYTES 01/17/2021 1* 0.0 - 0.5 % Final    ABS. NEUTROPHILS 01/17/2021 3.3  1.8 - 8.0 K/UL Final    ABS. LYMPHOCYTES 01/17/2021 0.7* 0.8 - 3.5 K/UL Final    ABS. MONOCYTES 01/17/2021 0.2  0.0 - 1.0 K/UL Final    ABS. EOSINOPHILS 01/17/2021 0.0  0.0 - 0.4 K/UL Final    ABS. BASOPHILS 01/17/2021 0.0  0.0 - 0.1 K/UL Final    ABS. IMM.  GRANS. 01/17/2021 0.0  0.00 - 0.04 K/UL Final    DF 01/17/2021 SMEAR SCANNED    Final    RBC COMMENTS 01/17/2021 NORMOCYTIC, NORMOCHROMIC    Final    Sodium 01/17/2021 142  136 - 145 mmol/L Final    Potassium 01/17/2021 5.1  3.5 - 5.1 mmol/L Final    Chloride 01/17/2021 106  97 - 108 mmol/L Final    CO2 01/17/2021 30  21 - 32 mmol/L Final    Anion gap 01/17/2021 6  5 - 15 mmol/L Final    Glucose 01/17/2021 89  65 - 100 mg/dL Final    BUN 01/17/2021 13  6 - 20 MG/DL Final    Creatinine 01/17/2021 1.52* 0.70 - 1.30 MG/DL Final    BUN/Creatinine ratio 01/17/2021 9* 12 - 20   Final    GFR est AA 01/17/2021 >60  >60 ml/min/1.73m2 Final    GFR est non-AA 01/17/2021 56* >60 ml/min/1.73m2 Final    Calcium 01/17/2021 8.9  8.5 - 10.1 MG/DL Final    Bilirubin, total 01/17/2021 0.5  0.2 - 1.0 MG/DL Final    ALT (SGPT) 01/17/2021 23  12 - 78 U/L Final    AST (SGOT) 01/17/2021 21  15 - 37 U/L Final    Alk.  phosphatase 01/17/2021 112  45 - 117 U/L Final    Protein, total 01/17/2021 7.4  6.4 - 8.2 g/dL Final    Albumin 01/17/2021 4.1  3.5 - 5.0 g/dL Final    Globulin 01/17/2021 3.3  2.0 - 4.0 g/dL Final    A-G Ratio 01/17/2021 1.2  1.1 - 2.2   Final    TSH 01/19/2021 0.79  0.36 - 3.74 uIU/mL Final    LIPID PROFILE 01/19/2021        Final    Cholesterol, total 01/19/2021 115  <200 MG/DL Final    Triglyceride 01/19/2021 47  <150 MG/DL Final    HDL Cholesterol 01/19/2021 56  MG/DL Final    LDL, calculated 01/19/2021 49.6  0 - 100 MG/DL Final    VLDL, calculated 01/19/2021 9.4  MG/DL Final    CHOL/HDL Ratio 01/19/2021 2.1  0.0 - 5.0   Final    Glucose 01/19/2021 81  65 - 100 MG/DL Final    Ventricular Rate 01/20/2021 64  BPM Final    Atrial Rate 01/20/2021 64  BPM Final    P-R Interval 01/20/2021 152  ms Final    QRS Duration 01/20/2021 86  ms Final    Q-T Interval 01/20/2021 376  ms Final    QTC Calculation (Bezet) 01/20/2021 387  ms Final    Calculated P Axis 01/20/2021 83  degrees Final    Calculated R Axis 01/20/2021 87  degrees Final    Calculated T Axis 01/20/2021 72  degrees Final    Diagnosis 01/20/2021    Final                    Value:** Age and gender specific ECG analysis **  Normal sinus rhythm with sinus arrhythmia  No previous ECGs available  Confirmed by Suleman Chinchilla M.D., Corinne Diamond ([id number]) on 1/21/2021 7:51:56 AM      SARS-CoV-2 01/25/2021 Please find results under separate order    Final   Office Visit on 01/14/2021   Component Date Value Ref Range Status    LIPID PROFILE 01/14/2021        Final    Cholesterol, total 01/14/2021 118  <200 MG/DL Final    Triglyceride 01/14/2021 33  <150 MG/DL Final    HDL Cholesterol 01/14/2021 65  MG/DL Final    LDL, calculated 01/14/2021 46.4  0 - 100 MG/DL Final    VLDL, calculated 01/14/2021 6.6  MG/DL Final    CHOL/HDL Ratio 01/14/2021 1.8  0.0 - 5.0   Final    Sodium 01/14/2021 138  136 - 145 mmol/L Final    Potassium 01/14/2021 4.4  3.5 - 5.1 mmol/L Final    Chloride 01/14/2021 105  97 - 108 mmol/L Final    CO2 01/14/2021 29  21 - 32 mmol/L Final    Anion gap 01/14/2021 4* 5 - 15 mmol/L Final    Glucose 01/14/2021 96  65 - 100 mg/dL Final    BUN 01/14/2021 12  6 - 20 MG/DL Final    Creatinine 01/14/2021 1.27  0.70 - 1.30 MG/DL Final    BUN/Creatinine ratio 01/14/2021 9* 12 - 20   Final    GFR est AA 01/14/2021 >60  >60 ml/min/1.73m2 Final    GFR est non-AA 01/14/2021 >60  >60 ml/min/1.73m2 Final    Calcium 01/14/2021 9.9  8.5 - 10.1 MG/DL Final    Bilirubin, total 01/14/2021 0.5  0.2 - 1.0 MG/DL Final    ALT (SGPT) 01/14/2021 27  12 - 78 U/L Final    AST (SGOT) 01/14/2021 16  15 - 37 U/L Final    Alk.  phosphatase 01/14/2021 110  45 - 117 U/L Final    Protein, total 01/14/2021 7.8  6.4 - 8.2 g/dL Final    Albumin 01/14/2021 4.7  3.5 - 5.0 g/dL Final    Globulin 01/14/2021 3.1  2.0 - 4.0 g/dL Final    A-G Ratio 01/14/2021 1.5  1.1 - 2.2   Final    WBC 01/14/2021 4.4  4.1 - 11.1 K/uL Final    RBC 01/14/2021 5.31  4.10 - 5.70 M/uL Final    HGB 01/14/2021 14.5  12.1 - 17.0 g/dL Final    HCT 01/14/2021 44.8  36.6 - 50.3 % Final    MCV 01/14/2021 84.4  80.0 - 99.0 FL Final    MCH 01/14/2021 27.3  26.0 - 34.0 PG Final    MCHC 01/14/2021 32.4  30.0 - 36.5 g/dL Final    RDW 01/14/2021 13.3  11.5 - 14.5 % Final    PLATELET 37/30/5553 708  150 - 400 K/uL Final    MPV 01/14/2021 9.8  8.9 - 12.9 FL Final    NRBC 01/14/2021 0.0  0  WBC Final    ABSOLUTE NRBC 01/14/2021 0.00  0.00 - 0.01 K/uL Final    NEUTROPHILS 01/14/2021 66  32 - 75 % Final    LYMPHOCYTES 01/14/2021 26  12 - 49 % Final    MONOCYTES 01/14/2021 6  5 - 13 % Final    EOSINOPHILS 01/14/2021 1  0 - 7 % Final    BASOPHILS 01/14/2021 1  0 - 1 % Final    IMMATURE GRANULOCYTES 01/14/2021 0  0.0 - 0.5 % Final  ABS. NEUTROPHILS 01/14/2021 2.9  1.8 - 8.0 K/UL Final    ABS. LYMPHOCYTES 01/14/2021 1.1  0.8 - 3.5 K/UL Final    ABS. MONOCYTES 01/14/2021 0.3  0.0 - 1.0 K/UL Final    ABS. EOSINOPHILS 01/14/2021 0.0  0.0 - 0.4 K/UL Final    ABS. BASOPHILS 01/14/2021 0.0  0.0 - 0.1 K/UL Final    ABS. IMM. GRANS. 01/14/2021 0.0  0.00 - 0.04 K/UL Final    DF 01/14/2021 AUTOMATED    Final    TSH 01/14/2021 0.86  0.36 - 3.74 uIU/mL Final     Xr Chest Port    Result Date: 1/25/2021  EXAM: XR CHEST PORT INDICATION: placement COMPARISON: None. FINDINGS: A portable AP radiograph of the chest was obtained at 1330 hours. The lungs are clear. The cardiac and mediastinal contours and pulmonary vascularity are normal.  The bones and soft tissues are grossly within normal limits. Normal chest.                  DISPOSITION:    Home. Patient to f/u with psychiatric, and psychotherapy appointments. Patient is to f/u with internist as directed. FOLLOW-UP CARE:    Activity as tolerated  Regular diet  Wound Care: none needed.   Follow-up Information     Follow up With Specialties Details Why Contact Info    [address]  Call today Yuly Vieira will call you this afternoon to check in. [address]  [phone]    [address] [place] [address] on 2/3/2021 Please attend your appointment with Siddharth Whatley on 2/3/21 at 4:00 [address]  [phone]    [facility]  Go today Transition to crisis stabilization today Crisis Stabilization Program  [place] Allen Allan Director  [phone]    [facility]  Go on 2/1/2021 Transition to [facility] on 2/1/21 Laurie [address]ardenas [address]  Valeria Jenkins - [phone]    [address] Jose Rodrigues MD Family Medicine   [address] [place] [id number]                   PROGNOSIS:   [address] ---- based on nature of patient's pathology/ies and treatment compliance issues. Prognosis is greatly dependent upon patient's ability to remain sober and to follow up with scheduled appointments as well as to comply with psychiatric medications as prescribed. DISCHARGE MEDICATIONS:     Informed consent given for the use of following psychotropic medications:  Current Discharge Medication List      CONTINUE these medications which have CHANGED    Details   !! ARIPiprazole (ABILIFY) 20 mg tablet Take 1 Tab by mouth nightly. Indications: schizophrenia  Qty: 30 Tab, Refills: 0      !! ARIPiprazole (ABILIFY) 10 mg tablet Take 1 Tab by mouth daily. Indications: schizophrenia  Qty: 30 Tab, Refills: 0      benztropine (COGENTIN) 1 mg tablet Take 1 Tab by mouth two (2) times a day. Indications: extrapyramidal symptoms as a result of taking the medication  Qty: 60 Tab, Refills: 0      busPIRone (BUSPAR) 10 mg tablet Take 1 Tab by mouth three (3) times daily. Indications: repeated episodes of anxiety  Qty: 30 Tab, Refills: 0      loratadine (CLARITIN) 10 mg tablet Take 1 Tab by mouth daily. Indications: inflammation of the nose due to an allergy  Qty: 30 Tab, Refills: 0      hydrOXYzine pamoate (VISTARIL) 50 mg capsule Take 1 Cap by mouth every eight (8) hours as needed for Anxiety. Indications: anxious  Qty: 90 Cap, Refills: 0      sertraline (ZOLOFT) 50 mg tablet Take 1 Tab by mouth daily. Indications: repeated episodes of anxiety, depression  Qty: 30 Tab, Refills: 0      melatonin 5 mg cap capsule Take 1 Cap by mouth nightly. Indications: insomnia  Qty: 30 Cap, Refills: 0      !! QUEtiapine (SEROquel) 300 mg tablet Take 1 Tab by mouth nightly. Indications: schizophrenia, insomnia  Qty: 30 Tab, Refills: 0      !! QUEtiapine (SEROquel) 50 mg tablet Take 1 Tab by mouth daily as needed (psychosis). Indications: bipolar depression  Qty: 30 Tab, Refills: 0       !! - Potential duplicate medications found. Please discuss with provider. A coordinated, multidisplinary treatment team round was conducted with Sourav Ireland is done daily here at [facility]. This team consists of the nurse, psychiatric unit pharmacist,  and writer. I have spent greater than 35 minutes on discharge work.     Signed:  Cale Keenan MD  1/26/2021 Routine  care and anticipatory guidance Quentin

## 2024-09-16 ENCOUNTER — TELEPHONE (OUTPATIENT)
Age: 29
End: 2024-09-16

## 2024-09-16 NOTE — TELEPHONE ENCOUNTER
Luis Resident called on the behalf of Patient Mr. Armas. I spoke to Mrs Saud Smith form Luis Resident she stated that she wanted to know the medication that Patient Mr. Armas was on and what he needs it for. Mrs Saud Smith stated that if possible she would like to have all his medication fax to her and what each medication does for Patient Mr. Armas. I stated to Mrs Saud Smith that I will send a message to the nurse about the information that she is asking for and  that it takes  up to 24-48 hour for and response.    Mrs Saud Smith BCBN [phone]

## 2024-09-18 NOTE — TELEPHONE ENCOUNTER
Returned a call to Caity and informed her that per Jesse the patient needed to be seen in the office since he has not been seen since 2022. Caity stated she reached this office in error and was trying to reach another provider.

## 2024-11-18 ENCOUNTER — OFFICE VISIT (OUTPATIENT)
Dept: PRIMARY CARE CLINIC | Facility: CLINIC | Age: 29
End: 2024-11-18
Payer: COMMERCIAL

## 2024-11-18 VITALS
TEMPERATURE: 97.5 F | SYSTOLIC BLOOD PRESSURE: 119 MMHG | OXYGEN SATURATION: 96 % | HEART RATE: 83 BPM | BODY MASS INDEX: 33.6 KG/M2 | HEIGHT: 75 IN | WEIGHT: 270.2 LBS | DIASTOLIC BLOOD PRESSURE: 65 MMHG | RESPIRATION RATE: 17 BRPM

## 2024-11-18 DIAGNOSIS — Z11.59 NEED FOR HEPATITIS C SCREENING TEST: ICD-10-CM

## 2024-11-18 DIAGNOSIS — R63.5 WEIGHT GAIN, ABNORMAL: ICD-10-CM

## 2024-11-18 DIAGNOSIS — F84.0 AUTISM: ICD-10-CM

## 2024-11-18 DIAGNOSIS — E66.811 OBESITY (BMI 30.0-34.9): ICD-10-CM

## 2024-11-18 DIAGNOSIS — Z91.09 ENVIRONMENTAL ALLERGIES: ICD-10-CM

## 2024-11-18 DIAGNOSIS — R06.83 SNORES: ICD-10-CM

## 2024-11-18 DIAGNOSIS — F25.0 SCHIZOAFFECTIVE DISORDER, BIPOLAR TYPE (HCC): Primary | ICD-10-CM

## 2024-11-18 LAB
ALBUMIN SERPL-MCNC: 3.9 G/DL (ref 3.5–5)
ALBUMIN/GLOB SERPL: 1.1 (ref 1.1–2.2)
ALP SERPL-CCNC: 124 U/L (ref 45–117)
ALT SERPL-CCNC: 35 U/L (ref 12–78)
ANION GAP SERPL CALC-SCNC: 5 MMOL/L (ref 2–12)
AST SERPL-CCNC: 19 U/L (ref 15–37)
BILIRUB SERPL-MCNC: 0.5 MG/DL (ref 0.2–1)
BUN SERPL-MCNC: 16 MG/DL (ref 6–20)
BUN/CREAT SERPL: 11 (ref 12–20)
CALCIUM SERPL-MCNC: 9.7 MG/DL (ref 8.5–10.1)
CHLORIDE SERPL-SCNC: 103 MMOL/L (ref 97–108)
CO2 SERPL-SCNC: 31 MMOL/L (ref 21–32)
CREAT SERPL-MCNC: 1.45 MG/DL (ref 0.7–1.3)
ERYTHROCYTE [DISTWIDTH] IN BLOOD BY AUTOMATED COUNT: 11.6 % (ref 11.5–14.5)
GLOBULIN SER CALC-MCNC: 3.4 G/DL (ref 2–4)
GLUCOSE SERPL-MCNC: 88 MG/DL (ref 65–100)
HCT VFR BLD AUTO: 40.4 % (ref 36.6–50.3)
HCV AB SER IA-ACNC: 0.19 INDEX
HCV AB SERPL QL IA: NONREACTIVE
HGB BLD-MCNC: 13.8 G/DL (ref 12.1–17)
MCH RBC QN AUTO: 30.1 PG (ref 26–34)
MCHC RBC AUTO-ENTMCNC: 34.2 G/DL (ref 30–36.5)
MCV RBC AUTO: 88 FL (ref 80–99)
NRBC # BLD: 0 K/UL (ref 0–0.01)
NRBC BLD-RTO: 0 PER 100 WBC
PLATELET # BLD AUTO: 234 K/UL (ref 150–400)
PMV BLD AUTO: 9.9 FL (ref 8.9–12.9)
POTASSIUM SERPL-SCNC: 4.7 MMOL/L (ref 3.5–5.1)
PROT SERPL-MCNC: 7.3 G/DL (ref 6.4–8.2)
RBC # BLD AUTO: 4.59 M/UL (ref 4.1–5.7)
SODIUM SERPL-SCNC: 139 MMOL/L (ref 136–145)
TSH SERPL DL<=0.05 MIU/L-ACNC: 1.18 UIU/ML (ref 0.36–3.74)
WBC # BLD AUTO: 4.8 K/UL (ref 4.1–11.1)

## 2024-11-18 PROCEDURE — 99214 OFFICE O/P EST MOD 30 MIN: CPT | Performed by: INTERNAL MEDICINE

## 2024-11-18 RX ORDER — FEXOFENADINE HCL 180 MG/1
180 TABLET ORAL DAILY
Qty: 90 TABLET | Refills: 0 | Status: SHIPPED | OUTPATIENT
Start: 2024-11-18 | End: 2025-02-16

## 2024-11-18 RX ORDER — TRAZODONE HYDROCHLORIDE 50 MG/1
50 TABLET, FILM COATED ORAL NIGHTLY PRN
COMMUNITY
Start: 2024-10-23

## 2024-11-18 RX ORDER — CLONAZEPAM 0.5 MG/1
0.5 TABLET ORAL 2 TIMES DAILY PRN
COMMUNITY
Start: 2024-10-10

## 2024-11-18 SDOH — ECONOMIC STABILITY: FOOD INSECURITY: WITHIN THE PAST 12 MONTHS, YOU WORRIED THAT YOUR FOOD WOULD RUN OUT BEFORE YOU GOT MONEY TO BUY MORE.: NEVER TRUE

## 2024-11-18 SDOH — ECONOMIC STABILITY: INCOME INSECURITY: HOW HARD IS IT FOR YOU TO PAY FOR THE VERY BASICS LIKE FOOD, HOUSING, MEDICAL CARE, AND HEATING?: NOT HARD AT ALL

## 2024-11-18 SDOH — ECONOMIC STABILITY: TRANSPORTATION INSECURITY
IN THE PAST 12 MONTHS, HAS LACK OF TRANSPORTATION KEPT YOU FROM MEETINGS, WORK, OR FROM GETTING THINGS NEEDED FOR DAILY LIVING?: NO

## 2024-11-18 SDOH — ECONOMIC STABILITY: FOOD INSECURITY: WITHIN THE PAST 12 MONTHS, THE FOOD YOU BOUGHT JUST DIDN'T LAST AND YOU DIDN'T HAVE MONEY TO GET MORE.: NEVER TRUE

## 2024-11-18 ASSESSMENT — PATIENT HEALTH QUESTIONNAIRE - PHQ9
9. THOUGHTS THAT YOU WOULD BE BETTER OFF DEAD, OR OF HURTING YOURSELF: NOT AT ALL
SUM OF ALL RESPONSES TO PHQ QUESTIONS 1-9: 4
7. TROUBLE CONCENTRATING ON THINGS, SUCH AS READING THE NEWSPAPER OR WATCHING TELEVISION: SEVERAL DAYS
2. FEELING DOWN, DEPRESSED OR HOPELESS: MORE THAN HALF THE DAYS
SUM OF ALL RESPONSES TO PHQ QUESTIONS 1-9: 4
10. IF YOU CHECKED OFF ANY PROBLEMS, HOW DIFFICULT HAVE THESE PROBLEMS MADE IT FOR YOU TO DO YOUR WORK, TAKE CARE OF THINGS AT HOME, OR GET ALONG WITH OTHER PEOPLE: SOMEWHAT DIFFICULT
5. POOR APPETITE OR OVEREATING: SEVERAL DAYS
8. MOVING OR SPEAKING SO SLOWLY THAT OTHER PEOPLE COULD HAVE NOTICED. OR THE OPPOSITE, BEING SO FIGETY OR RESTLESS THAT YOU HAVE BEEN MOVING AROUND A LOT MORE THAN USUAL: NOT AT ALL
SUM OF ALL RESPONSES TO PHQ QUESTIONS 1-9: 4
1. LITTLE INTEREST OR PLEASURE IN DOING THINGS: NOT AT ALL
SUM OF ALL RESPONSES TO PHQ9 QUESTIONS 1 & 2: 2
SUM OF ALL RESPONSES TO PHQ QUESTIONS 1-9: 4
6. FEELING BAD ABOUT YOURSELF - OR THAT YOU ARE A FAILURE OR HAVE LET YOURSELF OR YOUR FAMILY DOWN: NOT AT ALL
4. FEELING TIRED OR HAVING LITTLE ENERGY: NOT AT ALL
3. TROUBLE FALLING OR STAYING ASLEEP: NOT AT ALL

## 2024-11-18 ASSESSMENT — ENCOUNTER SYMPTOMS
SHORTNESS OF BREATH: 0
EYE DISCHARGE: 0
RHINORRHEA: 0
COLOR CHANGE: 0
DIARRHEA: 0
CHEST TIGHTNESS: 0
SORE THROAT: 0
CONSTIPATION: 0
BACK PAIN: 0
COUGH: 0
ABDOMINAL PAIN: 0

## 2024-11-18 NOTE — PROGRESS NOTES
Health Decision Maker has been checked with the patient   Primary Decision Maker: Aleksander Ray - Parent - [phone]     Patient has stated that the scribe can come in room    Chief Complaint   Patient presents with    Annual Exam     Weight gain       \"Have you been to the ER, urgent care clinic since your last visit?  Hospitalized since your last visit?\"    NO    “Have you seen or consulted any other health care providers outside of [facility] since your last visit?”    YES - When: approximately 1 months ago.  Where and Why: Psych.      Vitals:    11/18/24 1012   BP: 119/65   Site: Left Upper Arm   Position: Sitting   Pulse: 83   Resp: 17   Temp: 97.5 °F (36.4 °C)   SpO2: 96%   Weight: 122.6 kg (270 lb 3.2 oz)   Height: 1.905 m (6' 3\")        Depression: At risk (11/18/2024)    PHQ-2     PHQ-2 Score: 4            Click Here for Release of Records Request    Specialist patient sees: Dr Jose Person [facility]    Chart reviewed: immunizations are documented.   Immunization History   Administered Date(s) Administered    DTaP, INFANRIX, (age 6w-6y), IM, 0.5mL 1995, 1995, 01/16/1996, 09/27/1996, 05/25/2000    Hepatitis A Vaccine 06/27/2008, 08/23/2010    Hepatitis B vaccine 1995, 1995, 01/16/1996, 01/14/2002    Hib vaccine 1995, 1995, 01/16/1996, 09/27/1996    Influenza, FLUARIX, FLULAVAL, FLUZONE (age 6 mo+) and AFLURIA, (age 3 y+), Quadv PF, 0.5mL 09/05/2019, 01/14/2021, 10/11/2021    MMR, PRIORIX, M-M-R II, (age 12m+), SC, 0.5mL 07/11/1996, 05/25/2000    Polio Virus Vaccine 1995, 1995, 01/16/1996, 05/25/2000    TD 2LF, TDVAX, (age 7y+), IM, 0.5mL 09/05/2019    TDaP, ADACEL (age 10y-64y), BOOSTRIX (age 10y+), IM, 0.5mL 08/18/2006    Varicella, VARIVAX, (age 12m+), SC, 0.5mL 05/25/2000, 06/27/2008      
(PROZAC) 40 MG capsule Take 1 capsule by mouth daily 30 capsule 1    lurasidone (LATUDA) 120 MG tablet Take 1 tablet by mouth Daily with supper (Patient taking differently: Take 140 mg by mouth Daily with supper) 30 tablet 1    Menthol-Methyl Salicylate (MUSCLE RUB) 10-15 % CREA cream Apply 1 each topically as needed (shoulder pain) 1 each 1     No current facility-administered medications on file prior to visit.       Allergies   Allergen Reactions    Latex Hives    Penicillins Hives, Itching and Shortness Of Breath    Aspirin Hives    Lactose Diarrhea       Past Medical History:   Diagnosis Date    Asperger syndrome        Past Surgical History:   Procedure Laterality Date    CIRCUMCISION  1995    HERNIA REPAIR      as an infant     HERNIA REPAIR Bilateral 09/1995       Family History   Problem Relation Age of Onset    No Known Problems Mother     No Known Problems Father        Social History     Socioeconomic History    Marital status: Single     Spouse name: Not on file    Number of children: Not on file    Years of education: Not on file    Highest education level: Not on file   Occupational History    Not on file   Tobacco Use    Smoking status: Never    Smokeless tobacco: Never   Vaping Use    Vaping status: Never Used   Substance and Sexual Activity    Alcohol use: Never    Drug use: Never    Sexual activity: Not on file   Other Topics Concern    Not on file   Social History Narrative    Lives with mom  Goes to day program     Social Determinants of Health     Financial Resource Strain: Low Risk  (11/18/2024)    Overall Financial Resource Strain (CARDIA)     Difficulty of Paying Living Expenses: Not hard at all   Food Insecurity: No Food Insecurity (11/18/2024)    Hunger Vital Sign     Worried About Running Out of Food in the Last Year: Never true     Ran Out of Food in the Last Year: Never true   Transportation Needs: Unknown (11/18/2024)    PRAPARE - Transportation     Lack of Transportation (Medical):

## 2024-12-30 ENCOUNTER — TELEPHONE (OUTPATIENT)
Dept: PRIMARY CARE CLINIC | Facility: CLINIC | Age: 29
End: 2024-12-30

## 2024-12-30 DIAGNOSIS — R79.89 ELEVATED SERUM CREATININE: Primary | ICD-10-CM

## 2024-12-30 NOTE — TELEPHONE ENCOUNTER
Called and spoke with the patients mother, letter is in the chart for her to print, she said thank you so much!

## 2024-12-30 NOTE — TELEPHONE ENCOUNTER
Pt mom is calling because the pt currently lives at   [facility]   From their last visit with Dr. Hoskins, Dr. Hoskins had mentioned the pt weight gain and suggested to put him on restrictions to control and even lose weight. Pt mom currently had him over for the holidays and during that time they were cooking him healthy meals and were monitoring his intake and he had started to lose weight. When they went to drop him off at the group home, pt mom asked the nurses may they give him some diet restrictions. The nurses told her that they will need an order or something from the doctor stating the pt needs to be put on a diet restriction.

## 2025-01-10 LAB
ALBUMIN SERPL-MCNC: 4.5 G/DL (ref 4.3–5.2)
ALP SERPL-CCNC: 135 IU/L (ref 44–121)
ALT SERPL-CCNC: 36 IU/L (ref 0–44)
AST SERPL-CCNC: 25 IU/L (ref 0–40)
BILIRUB SERPL-MCNC: 0.4 MG/DL (ref 0–1.2)
BUN SERPL-MCNC: 14 MG/DL (ref 6–20)
BUN/CREAT SERPL: 12 (ref 9–20)
CALCIUM SERPL-MCNC: 9.4 MG/DL (ref 8.7–10.2)
CHLORIDE SERPL-SCNC: 98 MMOL/L (ref 96–106)
CO2 SERPL-SCNC: 23 MMOL/L (ref 20–29)
CREAT SERPL-MCNC: 1.13 MG/DL (ref 0.76–1.27)
EGFRCR SERPLBLD CKD-EPI 2021: 90 ML/MIN/1.73
GLOBULIN SER CALC-MCNC: 2.5 G/DL (ref 1.5–4.5)
GLUCOSE SERPL-MCNC: 89 MG/DL (ref 70–99)
POTASSIUM SERPL-SCNC: 4.5 MMOL/L (ref 3.5–5.2)
PROT SERPL-MCNC: 7 G/DL (ref 6–8.5)
SODIUM SERPL-SCNC: 138 MMOL/L (ref 134–144)

## 2025-01-19 DIAGNOSIS — E66.811 OBESITY (BMI 30.0-34.9): Primary | ICD-10-CM

## 2025-01-29 DIAGNOSIS — Z91.09 ENVIRONMENTAL ALLERGIES: ICD-10-CM

## 2025-01-29 RX ORDER — FEXOFENADINE HYDROCHLORIDE 180 MG/1
TABLET, FILM COATED ORAL
Qty: 30 TABLET | Refills: 0 | Status: SHIPPED | OUTPATIENT
Start: 2025-01-29

## 2025-02-10 ENCOUNTER — HOSPITAL ENCOUNTER (OUTPATIENT)
Facility: HOSPITAL | Age: 30
Setting detail: RECURRING SERIES
Discharge: HOME | End: 2025-02-13
Payer: COMMERCIAL

## 2025-02-10 PROCEDURE — 97802 MEDICAL NUTRITION INDIV IN: CPT

## 2025-02-10 NOTE — PROGRESS NOTES
[facility] - Outpatient Nutrition Services     Nutrition Assessment - Medical Nutrition Therapy   Outpatient Initial Evaluation         Patient Name: Pawel Ray : 1995   Treatment Diagnosis: E66.811 (ICD-10-CM) - Obesity (BMI 30.0-34.9)    Referral Source: Paula Hoskins MD Start of Care (SOC): 2/10/2025     In time:   1130am             Out time:   1237pm   Total Treatment Time (min):   67     Gender: male Age: 29 y.o.   Ht: 75 in Wt:  266.6 lb 122.6 kg   BMI: 33.7 (Class I obesity) BMR   Male 2275 BMR Female      Past Medical History:  Patient Active Problem List   Diagnosis    Autism    Schizoaffective disorder (HCC)        Pertinent Medications:     Current Outpatient Medications:     ALLERGY RELIEF 180 MG tablet, TAKE 1 TABLET BY MOUTH DAILY FOR ALLERGIES, Disp: 30 tablet, Rfl: 0    traZODone (DESYREL) 50 MG tablet, Take 1 tablet by mouth nightly as needed, Disp: , Rfl:     clonazePAM (KLONOPIN) 0.5 MG tablet, Take 1 tablet by mouth 2 times daily as needed., Disp: , Rfl:     cyclobenzaprine (FLEXERIL) 10 MG tablet, Take 1 tablet by mouth as needed for Muscle spasms (as needed for spasm), Disp: 20 tablet, Rfl: 0    acetaminophen (TYLENOL) 500 MG tablet, Take 1 tablet by mouth every 6 hours as needed for Pain, Disp: 30 tablet, Rfl: 1    busPIRone (BUSPAR) 15 MG tablet, Take 15 mg by mouth 3 times daily (Patient taking differently: Take 15 mg by mouth in the morning and at bedtime), Disp: 90 tablet, Rfl: 1    divalproex (DEPAKOTE ER) 500 MG extended release tablet, Take 3 tablets by mouth nightly (Patient taking differently: Take 1,250 mg by mouth nightly), Disp: 90 tablet, Rfl: 1    FLUoxetine (PROZAC) 40 MG capsule, Take 1 capsule by mouth daily, Disp: 30 capsule, Rfl: 1    lurasidone (LATUDA) 120 MG tablet, Take 1 tablet by mouth Daily with supper (Patient taking differently: Take 140 mg by mouth Daily with supper), Disp: 30 tablet, Rfl: 1    Menthol-Methyl Salicylate (MUSCLE RUB) 10-15 %

## 2025-02-28 DIAGNOSIS — Z91.09 ENVIRONMENTAL ALLERGIES: ICD-10-CM

## 2025-02-28 RX ORDER — FEXOFENADINE HYDROCHLORIDE 180 MG/1
TABLET, FILM COATED ORAL
Qty: 90 TABLET | Refills: 0 | Status: SHIPPED | OUTPATIENT
Start: 2025-02-28

## 2025-05-16 DIAGNOSIS — M62.838 MUSCLE SPASM: ICD-10-CM

## 2025-05-16 RX ORDER — POLYETHYLENE GLYCOL 3350 17 G/17G
17 POWDER, FOR SOLUTION ORAL DAILY PRN
Qty: 527 G | Refills: 0 | Status: SHIPPED | OUTPATIENT
Start: 2025-05-16 | End: 2025-07-17

## 2025-05-16 RX ORDER — CYCLOBENZAPRINE HCL 10 MG
10 TABLET ORAL AS NEEDED
Qty: 20 TABLET | Refills: 0 | Status: SHIPPED | OUTPATIENT
Start: 2025-05-16

## 2025-05-16 RX ORDER — ACETAMINOPHEN 500 MG
500 TABLET ORAL EVERY 6 HOURS PRN
Qty: 30 TABLET | Refills: 0 | Status: SHIPPED | OUTPATIENT
Start: 2025-05-16

## 2025-05-16 RX ORDER — MUSCLE RUB CREAM 100; 150 MG/G; MG/G
1 CREAM TOPICAL PRN
Qty: 1 EACH | Refills: 0 | Status: SHIPPED | OUTPATIENT
Start: 2025-05-16

## 2025-05-16 NOTE — TELEPHONE ENCOUNTER
Last office note reviewed. 1 refill given while PCP is out of office.  
Requested Prescriptions     Pending Prescriptions Disp Refills    cyclobenzaprine (FLEXERIL) 10 MG tablet 20 tablet 0     Sig: Take 1 tablet by mouth as needed for Muscle spasms (as needed for spasm)    polyethylene glycol (GLYCOLAX) 17 g packet 527 g 1     Sig: Take 1 packet by mouth daily as needed for Constipation    acetaminophen (TYLENOL) 500 MG tablet 30 tablet 0     Sig: Take 1 tablet by mouth every 6 hours as needed for Pain    Menthol-Methyl Salicylate (MUSCLE RUB) 10-15 % CREA cream 1 each 1     Sig: Apply 1 each topically as needed (shoulder pain)        Last Visit 11/18/24    
If you are a smoker, it is important for your health to stop smoking. Please be aware that second hand smoke is also harmful.

## 2025-05-30 DIAGNOSIS — Z91.09 ENVIRONMENTAL ALLERGIES: ICD-10-CM

## 2025-05-30 RX ORDER — FEXOFENADINE HYDROCHLORIDE 180 MG/1
TABLET, FILM COATED ORAL
Qty: 30 TABLET | Refills: 1 | Status: SHIPPED | OUTPATIENT
Start: 2025-05-30

## 2025-06-13 DIAGNOSIS — R52 PAIN: Primary | ICD-10-CM

## 2025-06-13 DIAGNOSIS — M62.838 MUSCLE SPASM: ICD-10-CM

## 2025-06-13 RX ORDER — MUSCLE RUB CREAM 100; 150 MG/G; MG/G
1 CREAM TOPICAL PRN
Qty: 1 EACH | Refills: 0 | Status: SHIPPED | OUTPATIENT
Start: 2025-06-13

## 2025-06-13 RX ORDER — ACETAMINOPHEN 500 MG
500 TABLET ORAL EVERY 6 HOURS PRN
Qty: 30 TABLET | Refills: 0 | Status: SHIPPED | OUTPATIENT
Start: 2025-06-13

## 2025-06-13 RX ORDER — CYCLOBENZAPRINE HCL 10 MG
10 TABLET ORAL AS NEEDED
Qty: 20 TABLET | Refills: 0 | Status: SHIPPED | OUTPATIENT
Start: 2025-06-13

## 2025-07-15 DIAGNOSIS — Z91.09 ENVIRONMENTAL ALLERGIES: ICD-10-CM

## 2025-07-15 RX ORDER — FEXOFENADINE HYDROCHLORIDE 180 MG/1
TABLET, FILM COATED ORAL
Qty: 30 TABLET | Refills: 2 | Status: SHIPPED | OUTPATIENT
Start: 2025-07-15